# Patient Record
Sex: FEMALE | Race: WHITE | Employment: UNEMPLOYED
[De-identification: names, ages, dates, MRNs, and addresses within clinical notes are randomized per-mention and may not be internally consistent; named-entity substitution may affect disease eponyms.]

---

## 2017-05-26 ENCOUNTER — HEALTH MAINTENANCE LETTER (OUTPATIENT)
Age: 57
End: 2017-05-26

## 2017-10-11 NOTE — PROGRESS NOTES
"  SUBJECTIVE:                                                    Rosanna Ivory is a 57 year old female who presents to clinic today for the following health issues:    HPI    Concern - Ear Problem  Onset: Sunday    Description:   \"Feeling like it was plugged\"  Went to a concert standing by the speakers    Progression of Symptoms:  improving    Accompanying Signs & Symptoms:  \"feels like vibration in there\"    Previous history of similar problem:   No    Precipitating factors:   Worsened by: no    Alleviating factors:  Improved by: baby oil on a cotton ball made it feel a little better    Therapies Tried and outcome: none    The sounds seems amplified on the right side since the concert but is improving. She denies any pain or drainage.     She was previously on lisinopril for hypertension, prescribed at [facility] but has been off this for the past year.     Needs cipro for trip to [place] in December.    Problem list and histories reviewed & adjusted, as indicated.  Additional history: none    ROS:  GENERAL: Denies fever, fatigue, weakness, weight gain, or weight loss.  HEENT: Eyes-Denies pain, redness, loss of vision, double or blurred vision.     Ears/Nose- +Right ear vibration/sound amplification. Denies tinnitus, loss of hearing, epistaxis, decreased sense of smell, nasal congestion. Denies loss of sense of taste, dry mouth, or sore throat.   CARDIOVASCULAR: Denies chest pain, shortness of breath, irregular heartbeats, palpitations, or edema.  RESPIRATORY: Denies cough, hemoptysis, and shortness of breath.  NEUROLOGIC: Denies headache, fainting, dizziness, memory loss, numbness, tingling, or seizures.    OBJECTIVE:     /86  Pulse 84  Temp 97.8  F (36.6  C) (Temporal)  Ht 5' 3.58\" (1.615 m)  Wt 127 lb (57.6 kg)  SpO2 99%  BMI 22.09 kg/m2  Body mass index is 22.09 kg/(m^2).  GENERAL: healthy, alert and no distress  HENT: ear canals and TM's normal, nose and mouth without ulcers or lesions  RESP: lungs " clear to auscultation - no rales, rhonchi or wheezes  CV: regular rate and rhythm, normal S1 S2, no S3 or S4, no murmur, click or rub    ASSESSMENT/PLAN:       ICD-10-CM    1. Discomfort of right ear H92.01    2. Benign essential hypertension I10 lisinopril (PRINIVIL/ZESTRIL) 10 MG tablet   3. Visit for screening mammogram Z12.31 MA SCREENING DIGITAL BILAT - Future  (s+30)   4. Traveler's diarrhea A09 ciprofloxacin (CIPRO) 500 MG tablet         1. No abnormality on exam. The vibration sensation is consistent with irritation from the loud concert and symptoms are improving so no intervention is needed.    2. Will restart lisinopril 10 mg daily and she will follow up in the pharmacy weekly for BP checks. Instructed to schedule an updated physical with updated labs.    3. Mammogram ordered.     4. Needs cipro to have on hand for traveler's diarrhea when traveling to [place] in December.      Broderick Siu PA-C  [facility]

## 2017-10-12 ENCOUNTER — OFFICE VISIT (OUTPATIENT)
Dept: FAMILY MEDICINE | Facility: OTHER | Age: 57
End: 2017-10-12
Payer: COMMERCIAL

## 2017-10-12 VITALS
DIASTOLIC BLOOD PRESSURE: 86 MMHG | OXYGEN SATURATION: 99 % | BODY MASS INDEX: 21.68 KG/M2 | TEMPERATURE: 97.8 F | SYSTOLIC BLOOD PRESSURE: 146 MMHG | HEIGHT: 64 IN | WEIGHT: 127 LBS | HEART RATE: 84 BPM

## 2017-10-12 DIAGNOSIS — H92.01 DISCOMFORT OF RIGHT EAR: Primary | ICD-10-CM

## 2017-10-12 DIAGNOSIS — I10 BENIGN ESSENTIAL HYPERTENSION: ICD-10-CM

## 2017-10-12 DIAGNOSIS — A09 TRAVELER'S DIARRHEA: ICD-10-CM

## 2017-10-12 DIAGNOSIS — Z12.31 VISIT FOR SCREENING MAMMOGRAM: ICD-10-CM

## 2017-10-12 PROCEDURE — 99203 OFFICE O/P NEW LOW 30 MIN: CPT | Performed by: PHYSICIAN ASSISTANT

## 2017-10-12 RX ORDER — CIPROFLOXACIN 500 MG/1
500 TABLET, FILM COATED ORAL 2 TIMES DAILY
Qty: 6 TABLET | Refills: 0 | Status: SHIPPED | OUTPATIENT
Start: 2017-10-12 | End: 2018-06-13

## 2017-10-12 RX ORDER — LISINOPRIL 10 MG/1
10 TABLET ORAL DAILY
Qty: 90 TABLET | Refills: 3 | Status: SHIPPED | OUTPATIENT
Start: 2017-10-12 | End: 2018-10-19

## 2017-10-12 ASSESSMENT — PAIN SCALES - GENERAL: PAINLEVEL: NO PAIN (0)

## 2017-10-12 NOTE — MR AVS SNAPSHOT
After Visit Summary   10/12/2017    Rosanna Ivory    MRN: [id number]           Patient Information     Date Of Birth          1960        Visit Information        Provider Department      10/12/2017 3:00 PM Broderick Siu PA-C [facility]        Today's Diagnoses     Discomfort of right ear    -  1    Benign essential hypertension        Visit for screening mammogram        Traveler's diarrhea          Care Instructions    The ear discomfort is likely from the loud speakers at the concert and should continue to improve. Your ear drum was normal on exam.    Will restart you on lisinopril 10 mg daily to help control your blood pressure.  Try to eat a low salt diet.  Follow up weekly in the pharmacy for blood pressure checks.     Will order an updated mammogram.    Come in for a physical within the next month for updated labs.              Follow-ups after your visit        Future tests that were ordered for you today     Open Future Orders        Priority Expected Expires Ordered    MA SCREENING DIGITAL BILAT - Future  (s+30) Routine  10/11/2018 10/12/2017            Who to contact     If you have questions or need follow up information about today's clinic visit or your schedule please contact [facility] directly at [phone].  Normal or non-critical lab and imaging results will be communicated to you by MyChart, letter or phone within 4 business days after the clinic has received the results. If you do not hear from us within 7 days, please contact the clinic through "[email or web address]hart or phone. If you have a critical or abnormal lab result, we will notify you by phone as soon as possible.  Submit refill requests through [employer] or call your pharmacy and they will forward the refill request to us. Please allow 3 business days for your refill to be completed.          Additional Information About Your Visit        MyChart Information     [employer] gives you secure  "access to your electronic health record. If you see a primary care provider, you can also send messages to your care team and make appointments. If you have questions, please call your primary care clinic.  If you do not have a primary care provider, please call [phone] and they will assist you.        Care EveryWhere ID     This is your Care EveryWhere ID. This could be used by other organizations to access your [place] medical records  [id number]        Your Vitals Were     Pulse Temperature Height Pulse Oximetry BMI (Body Mass Index)       84 97.8  F (36.6  C) (Temporal) 5' 3.58\" (1.615 m) 99% 22.09 kg/m2        Blood Pressure from Last 3 Encounters:   10/12/17 146/82   07/29/13 (!) 145/95   05/16/13 (!) 147/94    Weight from Last 3 Encounters:   10/12/17 127 lb (57.6 kg)   07/29/13 135 lb (61.2 kg)   05/16/13 135 lb (61.2 kg)                 Today's Medication Changes          These changes are accurate as of: 10/12/17  3:24 PM.  If you have any questions, ask your nurse or doctor.               Start taking these medicines.        Dose/Directions    ciprofloxacin 500 MG tablet   Commonly known as:  CIPRO   Used for:  Traveler's diarrhea   Started by:  Broderick Siu PA-C        Dose:  500 mg   Take 1 tablet (500 mg) by mouth 2 times daily   Quantity:  6 tablet   Refills:  0       lisinopril 10 MG tablet   Commonly known as:  PRINIVIL/ZESTRIL   Used for:  Benign essential hypertension   Started by:  Broderick Siu PA-C        Dose:  10 mg   Take 1 tablet (10 mg) by mouth daily   Quantity:  90 tablet   Refills:  3         Stop taking these medicines if you haven't already. Please contact your care team if you have questions.     ALPRAZolam 0.5 MG tablet   Commonly known as:  XANAX   Stopped by:  Broderick Siu PA-C           citalopram 20 MG tablet   Commonly known as:  celeXA   Stopped by:  Broderick Siu PA-C           LESSINA-28 0.1-20 MG-MCG per tablet   Generic drug:  " levonorgestrel-ethinyl estradiol   Stopped by:  Broderick Siu PA-C           LORazepam 0.5 MG tablet   Commonly known as:  ATIVAN   Stopped by:  Broderick Siu PA-C                Where to get your medicines      These medications were sent to [id number] [place] - [address]  [address], [place] [address]     Phone:  [phone]     ciprofloxacin 500 MG tablet    lisinopril 10 MG tablet                Primary Care Provider Office Phone # Fax #    Shannan Dahlia FLORENCIA Amaya [phone]       [address] [address]        Equal Access to Services     ROMAN HANNAH : Laure Rowland, wajustice campos, qaybta kaalmada adejose francisco, alice fiore . So [facility] [phone].    ATENCIÓN: Si habla español, tiene a lin disposición servicios gratuitos de asistencia lingüística. Llame al [phone].    We comply with applicable federal civil rights laws and [place] laws. We do not discriminate on the basis of race, color, national origin, age, disability, sex, sexual orientation, or gender identity.            Thank you!     Thank you for choosing [facility]  for your care. Our goal is always to provide you with excellent care. Hearing back from our patients is one way we can continue to improve our services. Please take a few minutes to complete the written survey that you may receive in the mail after your visit with us. Thank you!             Your Updated Medication List - Protect others around you: Learn how to safely use, store and throw away your medicines at www.disposemymeds.org.          This list is accurate as of: 10/12/17  3:24 PM.  Always use your most recent med list.                   Brand Name Dispense Instructions for use Diagnosis    CALCIUM 600 + D 600-200 MG-UNIT Tabs     0    bid        ciprofloxacin 500 MG tablet    CIPRO    6 tablet    Take 1 tablet (500 mg) by mouth 2 times  daily    Traveler's diarrhea       fluticasone 50 MCG/ACT spray    FLONASE    1 Package    USE TWO SPRAYS IN EACH NOSTRIL DAILY    Allergic rhinitis, cause unspecified       lisinopril 10 MG tablet    PRINIVIL/ZESTRIL    90 tablet    Take 1 tablet (10 mg) by mouth daily    Benign essential hypertension       omega-3 fatty acids 1200 MG capsule      Take four tablets daily        omeprazole 20 MG CR capsule    priLOSEC    30 capsule    Take 1 capsule by mouth every morning (before breakfast) for 30 days. TAKE BEFORE YOUR BREAKFAST MEAL DAILY        ZANTAC PO      Take  by mouth.

## 2017-10-12 NOTE — PATIENT INSTRUCTIONS
The ear discomfort is likely from the loud speakers at the concert and should continue to improve. Your ear drum was normal on exam.    Will restart you on lisinopril 10 mg daily to help control your blood pressure.  Try to eat a low salt diet.  Follow up weekly in the pharmacy for blood pressure checks.     Will order an updated mammogram.    Come in for a physical within the next month for updated labs.

## 2017-10-12 NOTE — NURSING NOTE
"Chief Complaint   Patient presents with     Ear Problem     Panel Management     height, tdap, pap, flu, mammo, honoring choices, hep c, lipids       Initial /82 (BP Location: Right arm, Patient Position: Chair, Cuff Size: Adult Regular)  Pulse 84  Temp 97.8  F (36.6  C) (Temporal)  Ht 5' 3.58\" (1.615 m)  Wt 127 lb (57.6 kg)  SpO2 99%  BMI 22.09 kg/m2 Estimated body mass index is 22.09 kg/(m^2) as calculated from the following:    Height as of this encounter: 5' 3.58\" (1.615 m).    Weight as of this encounter: 127 lb (57.6 kg).  Medication Reconciliation: complete     Nany Alicea CMA      "

## 2017-11-02 ENCOUNTER — ALLIED HEALTH/NURSE VISIT (OUTPATIENT)
Dept: FAMILY MEDICINE | Facility: OTHER | Age: 57
End: 2017-11-02
Payer: COMMERCIAL

## 2017-11-02 ENCOUNTER — RADIANT APPOINTMENT (OUTPATIENT)
Dept: MAMMOGRAPHY | Facility: OTHER | Age: 57
End: 2017-11-02
Attending: PHYSICIAN ASSISTANT
Payer: COMMERCIAL

## 2017-11-02 VITALS — SYSTOLIC BLOOD PRESSURE: 128 MMHG | DIASTOLIC BLOOD PRESSURE: 74 MMHG

## 2017-11-02 DIAGNOSIS — I10 BENIGN ESSENTIAL HYPERTENSION: Primary | ICD-10-CM

## 2017-11-02 DIAGNOSIS — Z12.31 VISIT FOR SCREENING MAMMOGRAM: ICD-10-CM

## 2017-11-02 PROCEDURE — G0202 SCR MAMMO BI INCL CAD: HCPCS | Mod: TC

## 2017-11-02 PROCEDURE — 99207 ZZC NO CHARGE NURSE ONLY: CPT | Performed by: PHYSICIAN ASSISTANT

## 2017-11-02 NOTE — PROGRESS NOTES
Rosanna Ivory is enrolled/participating in the retail pharmacy Blood Pressure Goals Achievement Program (BPGAP).  Rosanna Ivory was evaluated at [facility] on November 2, 2017 at which time her blood pressure was:    BP Readings from Last 3 Encounters:   11/02/17 128/74   10/12/17 146/86   07/29/13 (!) 145/95     Rosanna Ivory is not experiencing symptoms.    Follow-Up: BP is at goal of < 140/90mmHg (patient 18+ years of age with or without diabetes).  Recommended follow-up at pharmacy in 6 months.     Recommendation to Provider: none       Completed by:     Hasmukh Darden PharmD  [facility]   [phone]

## 2017-11-02 NOTE — MR AVS SNAPSHOT
After Visit Summary   11/2/2017    Rosanna Ivory    MRN: [id number]           Patient Information     Date Of Birth          1960        Visit Information        Provider Department      11/2/2017 5:24 PM Broderick Siu PA-C [facility]        Today's Diagnoses     Benign essential hypertension    -  1       Follow-ups after your visit        Who to contact     If you have questions or need follow up information about today's clinic visit or your schedule please contact [facility] directly at [phone].  Normal or non-critical lab and imaging results will be communicated to you by [employer], letter or phone within 4 business days after the clinic has received the results. If you do not hear from us within 7 days, please contact the clinic through [employer] or phone. If you have a critical or abnormal lab result, we will notify you by phone as soon as possible.  Submit refill requests through [employer] or call your pharmacy and they will forward the refill request to us. Please allow 3 business days for your refill to be completed.          Additional Information About Your Visit        MyChart Information     [employer] gives you secure access to your electronic health record. If you see a primary care provider, you can also send messages to your care team and make appointments. If you have questions, please call your primary care clinic.  If you do not have a primary care provider, please call [phone] and they will assist you.        Care EveryWhere ID     This is your Care EveryWhere ID. This could be used by other organizations to access your [place] medical records  [id number]         Blood Pressure from Last 3 Encounters:   11/02/17 128/74   10/12/17 146/86   07/29/13 (!) 145/95    Weight from Last 3 Encounters:   10/12/17 127 lb (57.6 kg)   07/29/13 135 lb (61.2 kg)   05/16/13 135 lb (61.2 kg)              Today, you had the following     No orders  found for display       Primary Care Provider Office Phone # Fax #    Broderick Siu PA-C [phone]       [address] [place] [address] [address]  [facility] [address]        Equal Access to Services     ROMAN HANNAH : Hadii aad ku hadrandyo Soericka, waaxda luqadaha, qaybta kaalmada adeegyada, alice feliciain hayaapina mcdonaldlandyjo ann garza. So [facility] [phone].    ATENCIÓN: Si habla español, tiene a lin disposición servicios gratuitos de asistencia lingüística. Llame al [phone].    We comply with applicable federal civil rights laws and [place] laws. We do not discriminate on the basis of race, color, national origin, age, disability, sex, sexual orientation, or gender identity.            Thank you!     Thank you for choosing [facility]  for your care. Our goal is always to provide you with excellent care. Hearing back from our patients is one way we can continue to improve our services. Please take a few minutes to complete the written survey that you may receive in the mail after your visit with us. Thank you!             Your Updated Medication List - Protect others around you: Learn how to safely use, store and throw away your medicines at www.disposemymeds.org.          This list is accurate as of: 11/2/17  5:27 PM.  Always use your most recent med list.                   Brand Name Dispense Instructions for use Diagnosis    CALCIUM 600 + D 600-200 MG-UNIT Tabs     0    bid        ciprofloxacin 500 MG tablet    CIPRO    6 tablet    Take 1 tablet (500 mg) by mouth 2 times daily    Traveler's diarrhea       fluticasone 50 MCG/ACT spray    FLONASE    1 Package    USE TWO SPRAYS IN EACH NOSTRIL DAILY    Allergic rhinitis, cause unspecified       lisinopril 10 MG tablet    PRINIVIL/ZESTRIL    90 tablet    Take 1 tablet (10 mg) by mouth daily    Benign essential hypertension       omega-3 fatty acids 1200 MG capsule      Take four tablets daily        omeprazole 20 MG CR capsule    priLOSEC    30  capsule    Take 1 capsule by mouth every morning (before breakfast) for 30 days. TAKE BEFORE YOUR BREAKFAST MEAL DAILY        ZANTAC PO      Take  by mouth.

## 2017-11-22 ENCOUNTER — TELEPHONE (OUTPATIENT)
Dept: FAMILY MEDICINE | Facility: OTHER | Age: 57
End: 2017-11-22

## 2017-11-22 NOTE — TELEPHONE ENCOUNTER
Summary:    Patient is due/failing the following:   PAP    Action needed:   Patient needs office visit for PAP.    Type of outreach:    none per care everywhere UTD    Questions for provider review:    None                                                                                                                                    Ximena Jackson    Please abstract the following data from this visit with this patient into the appropriate field in Epic:    Pap smear done on this date: 02/10/2015 (approximately), by this group: [facility], results in care everywhere.       Chart routed to [employer]  .      Panel Management Review      Patient has the following on her problem list:     Hypertension   Last three blood pressure readings:  BP Readings from Last 3 Encounters:   11/02/17 128/74   10/12/17 146/86   07/29/13 (!) 145/95     Blood pressure: Passed    HTN Guidelines:  Age 18-59 BP range:  Less than 140/90  Age 60-85 with Diabetes:  Less than 140/90  Age 60-85 without Diabetes:  less than 150/90        Composite cancer screening  Chart review shows that this patient is due/due soon for the following Pap Smear

## 2017-12-12 ENCOUNTER — OFFICE VISIT (OUTPATIENT)
Dept: FAMILY MEDICINE | Facility: OTHER | Age: 57
End: 2017-12-12
Payer: COMMERCIAL

## 2017-12-12 VITALS
OXYGEN SATURATION: 100 % | HEART RATE: 92 BPM | TEMPERATURE: 97.6 F | RESPIRATION RATE: 14 BRPM | DIASTOLIC BLOOD PRESSURE: 72 MMHG | WEIGHT: 128.6 LBS | SYSTOLIC BLOOD PRESSURE: 112 MMHG | BODY MASS INDEX: 22.36 KG/M2

## 2017-12-12 DIAGNOSIS — I10 BENIGN ESSENTIAL HYPERTENSION: ICD-10-CM

## 2017-12-12 DIAGNOSIS — J20.9 ACUTE BRONCHITIS WITH SYMPTOMS > 10 DAYS: Primary | ICD-10-CM

## 2017-12-12 DIAGNOSIS — R07.0 THROAT PAIN: ICD-10-CM

## 2017-12-12 LAB
DEPRECATED S PYO AG THROAT QL EIA: NORMAL
SPECIMEN SOURCE: NORMAL

## 2017-12-12 PROCEDURE — 87880 STREP A ASSAY W/OPTIC: CPT | Performed by: NURSE PRACTITIONER

## 2017-12-12 PROCEDURE — 99214 OFFICE O/P EST MOD 30 MIN: CPT | Performed by: NURSE PRACTITIONER

## 2017-12-12 PROCEDURE — 87081 CULTURE SCREEN ONLY: CPT | Performed by: NURSE PRACTITIONER

## 2017-12-12 RX ORDER — AZITHROMYCIN 250 MG/1
TABLET, FILM COATED ORAL
Qty: 6 TABLET | Refills: 0 | Status: SHIPPED | OUTPATIENT
Start: 2017-12-12 | End: 2018-06-13

## 2017-12-12 ASSESSMENT — PAIN SCALES - GENERAL: PAINLEVEL: NO PAIN (0)

## 2017-12-12 NOTE — PATIENT INSTRUCTIONS
- Start Azithromycin today   - Recommend humidification at night  - Hot water with honey and lemon, helps suppress cough.   -Over the counter cough drops.  - Coricidin cold and cough medication for individuals with hypertension  - If your cough continues or worsens please return to clinic.     PEARL Dominguez CNP

## 2017-12-12 NOTE — PROGRESS NOTES
"  SUBJECTIVE:                                                    Rosanna Ivory is a 57 year old female who presents to clinic today for the following health issues:    HPI    Acute Illness   Acute illness concerns: Cough  Onset: Couple of weeks ago    Fever: no    Chills/Sweats: YES - chills and sweats    Headache (location?): YES    Sinus Pressure:YES    Conjunctivitis:  no    Ear Pain: no    Rhinorrhea: YES    Congestion: no    Sore Throat: YES     Cough: YES-productive of \"brown\" sputum    Wheeze: YES    Decreased Appetite: no    Nausea: no    Vomiting: no    Diarrhea:  no    Dysuria/Freq.: no    Fatigue/Achiness: YES- fatigue and achiness    Sick/Strep Exposure: no     Therapies Tried and outcome: Ibuprofen - helps        Problem list and histories reviewed & adjusted, as indicated.  Additional history: as documented      Current Outpatient Prescriptions   Medication Sig Dispense Refill     lisinopril (PRINIVIL/ZESTRIL) 10 MG tablet Take 1 tablet (10 mg) by mouth daily 90 tablet 3     ciprofloxacin (CIPRO) 500 MG tablet Take 1 tablet (500 mg) by mouth 2 times daily 6 tablet 0     fluticasone (FLONASE) 50 MCG/ACT nasal spray USE TWO SPRAYS IN EACH NOSTRIL DAILY 1 Package prn     Ranitidine HCl (ZANTAC PO) Take  by mouth.       CALCIUM 600 + D 600-200 MG-UNIT OR TABS bid 0 0     omeprazole (PRILOSEC) 20 MG capsule Take 1 capsule by mouth every morning (before breakfast) for 30 days. TAKE BEFORE YOUR BREAKFAST MEAL DAILY 30 capsule 0     OMEGA-3 FATTY ACIDS 1200 MG OR CAPS Take four tablets daily  0     BP Readings from Last 3 Encounters:   12/12/17 112/72   11/02/17 128/74   10/12/17 146/86    Wt Readings from Last 3 Encounters:   12/12/17 128 lb 9.6 oz (58.3 kg)   10/12/17 127 lb (57.6 kg)   07/29/13 135 lb (61.2 kg)                      ROS:  As noted above.     OBJECTIVE:     /72  Pulse 92  Temp 97.6  F (36.4  C) (Temporal)  Resp 14  Wt 128 lb 9.6 oz (58.3 kg)  SpO2 100%  BMI 22.36 kg/m2  Body mass " index is 22.36 kg/(m^2).  GENERAL: healthy, alert and no distress  EYES: Eyes grossly normal to inspection, PERRL and conjunctivae and sclerae normal  HENT: ear canals and TM's normal, nose and mouth without ulcers or lesions  NECK: no adenopathy, no asymmetry, masses, or scars and thyroid normal to palpation  RESP: lungs clear to auscultation - no rales, rhonchi or wheezes  CV: regular rate and rhythm, normal S1 S2, no S3 or S4, no murmur, click or rub, no peripheral edema and peripheral pulses strong  MS: no gross musculoskeletal defects noted, no edema  SKIN: no suspicious lesions or rashes  NEURO: Normal strength and tone, mentation intact and speech normal  PSYCH: mentation appears normal, affect normal/bright    Diagnostic Test Results:  Results for orders placed or performed in visit on 12/12/17   Strep, Rapid Screen   Result Value Ref Range    Specimen Description Throat     Rapid Strep A Screen       NEGATIVE: No Group A streptococcal antigen detected by immunoassay, await culture report.       ASSESSMENT/PLAN:         ICD-10-CM    1. Acute bronchitis with symptoms > 10 days J20.9 azithromycin (ZITHROMAX) 250 MG tablet   2. Throat pain R07.0 Strep, Rapid Screen     Beta strep group A culture   3. Benign essential hypertension I10        - Start Azithromycin today   - Recommend humidification at night  - Hot water with honey and lemon, helps suppress cough.   -Over the counter cough drops.  - Coricidin cold and cough medication for individuals with hypertension  - If your cough continues or worsens please return to clinic.     Patient Instructions   - Start Azithromycin today   - Recommend humidification at night  - Hot water with honey and lemon, helps suppress cough.   -Over the counter cough drops.  - Coricidin cold and cough medication for individuals with hypertension  - If your cough continues or worsens please return to clinic.     PEARL Dominguez CNP, APRN CNP [place]  [facility]

## 2017-12-12 NOTE — MR AVS SNAPSHOT
After Visit Summary   12/12/2017    Rosanna Ivory    MRN: [id number]           Patient Information     Date Of Birth          1960        Visit Information        Provider Department      12/12/2017 3:40 PM Nae Pantoja APRN CNP [facility]        Today's Diagnoses     Throat pain    -  1    Benign essential hypertension        Acute bronchitis with symptoms > 10 days          Care Instructions    - Start Azithromycin today   - Recommend humidification at night  - Hot water with honey and lemon, helps suppress cough.   -Over the counter cough drops.  - Coricidin cold and cough medication for individuals with hypertension  - If your cough continues or worsens please return to clinic.     PEARL Dominguez CNP              Follow-ups after your visit        Follow-up notes from your care team     Return if symptoms worsen or fail to improve.      Who to contact     If you have questions or need follow up information about today's clinic visit or your schedule please contact [facility] directly at [phone].  Normal or non-critical lab and imaging results will be communicated to you by Byliner[employer], letter or phone within 4 business days after the clinic has received the results. If you do not hear from us within 7 days, please contact the clinic through [employer] or phone. If you have a critical or abnormal lab result, we will notify you by phone as soon as possible.  Submit refill requests through [employer] or call your pharmacy and they will forward the refill request to us. Please allow 3 business days for your refill to be completed.          Additional Information About Your Visit        Bylinerhart Information     [employer] gives you secure access to your electronic health record. If you see a primary care provider, you can also send messages to your care team and make appointments. If you have questions, please call your primary care clinic.  If you do not have a  primary care provider, please call [phone] and they will assist you.        Care EveryWhere ID     This is your Care EveryWhere ID. This could be used by other organizations to access your [place] medical records  [id number]        Your Vitals Were     Pulse Temperature Respirations Pulse Oximetry BMI (Body Mass Index)       92 97.6  F (36.4  C) (Temporal) 14 100% 22.36 kg/m2        Blood Pressure from Last 3 Encounters:   12/12/17 112/72   11/02/17 128/74   10/12/17 146/86    Weight from Last 3 Encounters:   12/12/17 128 lb 9.6 oz (58.3 kg)   10/12/17 127 lb (57.6 kg)   07/29/13 135 lb (61.2 kg)              We Performed the Following     Beta strep group A culture     Strep, Rapid Screen          Today's Medication Changes          These changes are accurate as of: 12/12/17  4:12 PM.  If you have any questions, ask your nurse or doctor.               Start taking these medicines.        Dose/Directions    azithromycin 250 MG tablet   Commonly known as:  ZITHROMAX   Used for:  Acute bronchitis with symptoms > 10 days   Started by:  Nae Pantoja APRN CNP        Two tablets first day, then one tablet daily for four days.   Quantity:  6 tablet   Refills:  0            Where to get your medicines      These medications were sent to [place] [address] [place] [facility] [place], [place] - [address] [address] [place] [address]  [address] [address] [address]     Phone:  [phone]     azithromycin 250 MG tablet                Primary Care Provider Office Phone # Fax #    Broderick Siu PA-C [phone]       [address] [place] [address]  [facility] [address]        Equal Access to Services     [facility]: Hadmihir Rowland, tavia campos, qaalice ratliff. [facility] [phone].    ATENCIÓN: Si habla español, tiene a lin disposición servicios gratuitos de asistencia lingüística. [place] al [phone].    We comply with applicable federal civil rights laws and  [place] laws. We do not discriminate on the basis of race, color, national origin, age, disability, sex, sexual orientation, or gender identity.            Thank you!     Thank you for choosing [facility]  for your care. Our goal is always to provide you with excellent care. Hearing back from our patients is one way we can continue to improve our services. Please take a few minutes to complete the written survey that you may receive in the mail after your visit with us. Thank you!             Your Updated Medication List - Protect others around you: Learn how to safely use, store and throw away your medicines at www.disposemymeds.org.          This list is accurate as of: 12/12/17  4:12 PM.  Always use your most recent med list.                   Brand Name Dispense Instructions for use Diagnosis    azithromycin 250 MG tablet    ZITHROMAX    6 tablet    Two tablets first day, then one tablet daily for four days.    Acute bronchitis with symptoms > 10 days       CALCIUM 600 + D 600-200 MG-UNIT Tabs     0    bid        ciprofloxacin 500 MG tablet    CIPRO    6 tablet    Take 1 tablet (500 mg) by mouth 2 times daily    Traveler's diarrhea       fluticasone 50 MCG/ACT spray    FLONASE    1 Package    USE TWO SPRAYS IN EACH NOSTRIL DAILY    Allergic rhinitis, cause unspecified       lisinopril 10 MG tablet    PRINIVIL/ZESTRIL    90 tablet    Take 1 tablet (10 mg) by mouth daily    Benign essential hypertension       omega-3 fatty acids 1200 MG capsule      Take four tablets daily        omeprazole 20 MG CR capsule    priLOSEC    30 capsule    Take 1 capsule by mouth every morning (before breakfast) for 30 days. TAKE BEFORE YOUR BREAKFAST MEAL DAILY        ZANTAC PO      Take  by mouth.

## 2017-12-13 LAB
BACTERIA SPEC CULT: NORMAL
SPECIMEN SOURCE: NORMAL

## 2018-06-01 ENCOUNTER — HEALTH MAINTENANCE LETTER (OUTPATIENT)
Age: 58
End: 2018-06-01

## 2018-06-08 NOTE — PROGRESS NOTES
SUBJECTIVE:   CC: Rosanna Ivory is an 57 year old woman who presents for preventive health visit.     Physical   Annual:     Getting at least 3 servings of Calcium per day::  Yes    Bi-annual eye exam::  Yes    Dental care twice a year::  Yes    Sleep apnea or symptoms of sleep apnea::  Excessive snoring    Diet::  Regular (no restrictions)    Frequency of exercise::  1 day/week    Duration of exercise::  15-30 minutes    Taking medications regularly::  Yes    Medication side effects::  None    Additional concerns today::  YES                Today's PHQ-2 Score:   PHQ-2 ( 1999 Pfizer) 6/13/2018   Q1: Little interest or pleasure in doing things 0   Q2: Feeling down, depressed or hopeless 0   PHQ-2 Score 0   Q1: Little interest or pleasure in doing things Not at all   Q2: Feeling down, depressed or hopeless Not at all   PHQ-2 Score 0       Abuse: Current or Past(Physical, Sexual or Emotional)- No  Do you feel safe in your environment - Yes    Social History   Substance Use Topics     Smoking status: Current Every Day Smoker     Packs/day: 0.50     Types: Cigarettes     Smokeless tobacco: Never Used     Alcohol use 15.0 oz/week     30 Cans of beer per week     Alcohol Use 6/13/2018   If you drink alcohol do you typically have greater than 3 drinks per day OR greater than 7 drinks per week? Yes   AUDIT SCORE  8     AUDIT - Alcohol Use Disorders Identification Test - Reproduced from the World Health Organization Audit 2001 (Second Edition) 6/13/2018   1.  How often do you have a drink containing alcohol? 4 or more times a week   2.  How many drinks containing alcohol do you have on a typical day when you are drinking? 3 or 4   3.  How often do you have five or more drinks on one occasion? Weekly   4.  How often during the last year have you found that you were not able to stop drinking once you had started? Never   5.  How often during the last year have you failed to do what was normally expected of you because of  drinking? Never   6.  How often during the last year have you needed a first drink in the morning to get yourself going after a heavy drinking session? Never   7.  How often during the last year have you had a feeling of guilt or remorse after drinking? Never   8.  How often during the last year have you been unable to remember what happened the night before because of your drinking? Never   9.  Have you or someone else been injured because of your drinking? No   10. Has a relative, friend, doctor or other health care worker been concerned about your drinking or suggested you cut down? No   TOTAL SCORE 8     Went through a period of time when she was self medicating with alcohol. Does not do this anymore. Resolving.     Reviewed orders with patient.  Reviewed health maintenance and updated orders accordingly - Yes  BP Readings from Last 3 Encounters:   06/13/18 120/78   12/12/17 112/72   11/02/17 128/74    Wt Readings from Last 3 Encounters:   06/13/18 131 lb (59.4 kg)   12/12/17 128 lb 9.6 oz (58.3 kg)   10/12/17 127 lb (57.6 kg)                  Current Outpatient Prescriptions   Medication Sig Dispense Refill     fluticasone (FLONASE) 50 MCG/ACT nasal spray USE TWO SPRAYS IN EACH NOSTRIL DAILY 1 Package prn     lisinopril (PRINIVIL/ZESTRIL) 10 MG tablet Take 1 tablet (10 mg) by mouth daily 90 tablet 3     OMEGA-3 FATTY ACIDS 1200 MG OR CAPS Take four tablets daily  0     Allergies   Allergen Reactions     Iodine        Patient over age 50, mutual decision to screen reflected in health maintenance.    Pertinent mammograms are reviewed under the imaging tab.  History of abnormal Pap smear:   YES - updated in Problem List and Health Maintenance accordingly  Last 3 Pap Results:   PAP (no units)   Date Value   01/18/2012 NIL   06/03/2011 NIL   07/30/2010 ASC-US (A)       Reviewed and updated as needed this visit by clinical staff  Tobacco  Allergies  Meds  Med Hx  Surg Hx  Fam Hx  Soc Hx        Reviewed and  "updated as needed this visit by Provider        Past Medical History:   Diagnosis Date     Alcohol abuse 2013    treatment     Allergic rhinitis, cause unspecified     seasonal     ASCUS on Pap smear 7/30/10    HPV + 52 (high risk)     Esophageal reflux      External hemorrhoids without mention of complication      History of colposcopy with cervical biopsy 8/23/10    bx- cervicitis.  no dysplasia noted     Other chest pain     atypical, normal stress testing     Pure hypercholesterolemia     total 220      Past Surgical History:   Procedure Laterality Date     C  DELIVERY ONLY      , Low Cervical     COLONOSCOPY  02/01/10    with snare polypectomy     HC CARDIAC STRESS TST,COMPLETE      normal per pt     HC HEMORRHOIDECTOMY,INT/EXT,SIMPLE  02/18/10       Review of Systems   Constitutional: Negative for chills and fever.   HENT: Negative for congestion, ear pain, hearing loss and sore throat.    Eyes: Negative for pain and visual disturbance.   Respiratory: Negative for cough and shortness of breath.    Cardiovascular: Negative for chest pain, palpitations and peripheral edema.   Gastrointestinal: Negative for abdominal pain, constipation, diarrhea, heartburn, hematochezia and nausea.   Genitourinary: Negative for dysuria, frequency, genital sores, hematuria, pelvic pain, urgency, vaginal bleeding and vaginal discharge.   Musculoskeletal: Negative for arthralgias, joint swelling and myalgias.   Skin: Negative for rash.   Neurological: Negative for dizziness, weakness, headaches and paresthesias.   Psychiatric/Behavioral: Negative for mood changes. The patient is not nervous/anxious.         OBJECTIVE:   /78  Pulse 72  Temp 97.7  F (36.5  C)  Resp 16  Ht 5' 3.5\" (1.613 m)  Wt 131 lb (59.4 kg)  BMI 22.84 kg/m2  Physical Exam   Constitutional: She is oriented to person, place, and time. She appears well-developed and well-nourished. No distress.   HENT:   Right Ear: Tympanic " membrane and external ear normal.   Left Ear: Tympanic membrane and external ear normal.   Nose: Nose normal.   Mouth/Throat: Oropharynx is clear and moist. No oropharyngeal exudate.   Eyes: Conjunctivae are normal. Pupils are equal, round, and reactive to light. Right eye exhibits no discharge. Left eye exhibits no discharge.   Neck: Neck supple. No tracheal deviation present. No thyromegaly present.   Cardiovascular: Normal rate, regular rhythm, S1 normal, S2 normal, normal heart sounds and normal pulses.  Exam reveals no S3, no S4 and no friction rub.    No murmur heard.  Pulmonary/Chest: Effort normal and breath sounds normal. No respiratory distress. She has no wheezes. She has no rales.   Abdominal: Soft. Bowel sounds are normal. She exhibits no mass. There is no hepatosplenomegaly. There is no tenderness.   Musculoskeletal: Normal range of motion. She exhibits no edema.   Lymphadenopathy:     She has no cervical adenopathy.   Neurological: She is alert and oriented to person, place, and time. She has normal strength and normal reflexes. She exhibits normal muscle tone.   Skin: Skin is warm and dry. No rash noted.   Psychiatric: She has a normal mood and affect. Judgment and thought content normal. Cognition and memory are normal.         ASSESSMENT/PLAN:       ICD-10-CM    1. Encounter for routine adult health examination without abnormal findings Z00.00    2. Benign essential hypertension I10 Comprehensive metabolic panel     CANCELED: Basic metabolic panel  (Ca, Cl, CO2, Creat, Gluc, K, Na, BUN)   3. Hyperlipidemia, unspecified hyperlipidemia type E78.5 Lipid panel reflex to direct LDL Fasting   4. Screening for malignant neoplasm of cervix Z12.4 Pap imaged thin layer screen with HPV - recommended age 30 - 65 years (select HPV order below)     HPV High Risk Types DNA Cervical   5. Screening for HIV (human immunodeficiency virus) Z11.4 HIV Screening   6. Need for hepatitis C screening test Z11.59 Hepatitis C  "Screen Reflex to HCV RNA Quant and Genotype   7. Need for prophylactic vaccination with tetanus-diphtheria (TD) Z23 TDAP VACCINE (ADACEL) [94197.002]     1st  Administration  [59404]     Each additional admin.  (Right click and add QUANTITY)  [51831]   8. Tobacco abuse Z72.0 Tobacco Cessation - Order to Satisfy Health Maintenance   9. Tobacco abuse counseling Z71.6    10. Need for vaccination Z23    11. Encounter for vitamin deficiency screening Z13.21 Vitamin D Deficiency   12. Personal history of tobacco use Z87.891 Prof Fee: Shared Decision Making Visit for Lung Cancer Screening       COUNSELING:  Reviewed preventive health counseling, as reflected in patient instructions       Regular exercise       Healthy diet/nutrition       Immunizations    Vaccinated for: TDAP               reports that she has been smoking Cigarettes.  She has been smoking about 0.50 packs per day. She has never used smokeless tobacco.  Tobacco Cessation Action Plan: Laser therapy   Estimated body mass index is 22.84 kg/(m^2) as calculated from the following:    Height as of this encounter: 5' 3.5\" (1.613 m).    Weight as of this encounter: 131 lb (59.4 kg).       Counseling Resources:  ATP IV Guidelines  Pooled Cohorts Equation Calculator  Breast Cancer Risk Calculator  FRAX Risk Assessment  ICSI Preventive Guidelines  Dietary Guidelines for Americans, 2010  USDA's MyPlate  ASA Prophylaxis  Lung CA Screening    PEARL Dominguez [facility]  Answers for HPI/ROS submitted by the patient on 6/13/2018   PHQ-2 Score: 0      Lung Cancer Screening Shared Decision Making Visit     Rosanna Ivory is not eligible for lung cancer screening on the basis of her smoking history:  History   Smoking Status     Current Every Day Smoker     Packs/day: 0.50     Years: 6.00     Types: Cigarettes   Smokeless Tobacco     Never Used       ShouldIScreen      "

## 2018-06-13 ENCOUNTER — OFFICE VISIT (OUTPATIENT)
Dept: FAMILY MEDICINE | Facility: OTHER | Age: 58
End: 2018-06-13
Payer: COMMERCIAL

## 2018-06-13 ENCOUNTER — TELEPHONE (OUTPATIENT)
Dept: FAMILY MEDICINE | Facility: OTHER | Age: 58
End: 2018-06-13

## 2018-06-13 VITALS
TEMPERATURE: 97.7 F | RESPIRATION RATE: 16 BRPM | WEIGHT: 131 LBS | HEIGHT: 64 IN | SYSTOLIC BLOOD PRESSURE: 120 MMHG | DIASTOLIC BLOOD PRESSURE: 78 MMHG | HEART RATE: 72 BPM | BODY MASS INDEX: 22.36 KG/M2

## 2018-06-13 DIAGNOSIS — Z00.00 ENCOUNTER FOR ROUTINE ADULT HEALTH EXAMINATION WITHOUT ABNORMAL FINDINGS: Primary | ICD-10-CM

## 2018-06-13 DIAGNOSIS — Z23 NEED FOR PROPHYLACTIC VACCINATION WITH TETANUS-DIPHTHERIA (TD): ICD-10-CM

## 2018-06-13 DIAGNOSIS — Z72.0 TOBACCO ABUSE: ICD-10-CM

## 2018-06-13 DIAGNOSIS — E78.5 HYPERLIPIDEMIA, UNSPECIFIED HYPERLIPIDEMIA TYPE: ICD-10-CM

## 2018-06-13 DIAGNOSIS — I10 BENIGN ESSENTIAL HYPERTENSION: ICD-10-CM

## 2018-06-13 DIAGNOSIS — Z87.891 PERSONAL HISTORY OF TOBACCO USE: ICD-10-CM

## 2018-06-13 DIAGNOSIS — Z13.21 ENCOUNTER FOR VITAMIN DEFICIENCY SCREENING: ICD-10-CM

## 2018-06-13 DIAGNOSIS — Z23 NEED FOR VACCINATION: ICD-10-CM

## 2018-06-13 DIAGNOSIS — Z11.59 NEED FOR HEPATITIS C SCREENING TEST: ICD-10-CM

## 2018-06-13 DIAGNOSIS — Z12.4 SCREENING FOR MALIGNANT NEOPLASM OF CERVIX: ICD-10-CM

## 2018-06-13 DIAGNOSIS — Z71.6 TOBACCO ABUSE COUNSELING: ICD-10-CM

## 2018-06-13 DIAGNOSIS — R79.89 LOW SERUM SODIUM: Primary | ICD-10-CM

## 2018-06-13 DIAGNOSIS — Z11.4 SCREENING FOR HIV (HUMAN IMMUNODEFICIENCY VIRUS): ICD-10-CM

## 2018-06-13 PROBLEM — F10.20 ALCOHOL DEPENDENCE (H): Status: ACTIVE | Noted: 2018-06-13

## 2018-06-13 LAB
ALBUMIN SERPL-MCNC: 4 G/DL (ref 3.4–5)
ALP SERPL-CCNC: 122 U/L (ref 40–150)
ALT SERPL W P-5'-P-CCNC: 25 U/L (ref 0–50)
ANION GAP SERPL CALCULATED.3IONS-SCNC: 8 MMOL/L (ref 3–14)
AST SERPL W P-5'-P-CCNC: 31 U/L (ref 0–45)
BILIRUB SERPL-MCNC: 0.9 MG/DL (ref 0.2–1.3)
BUN SERPL-MCNC: 6 MG/DL (ref 7–30)
CALCIUM SERPL-MCNC: 8.8 MG/DL (ref 8.5–10.1)
CHLORIDE SERPL-SCNC: 94 MMOL/L (ref 94–109)
CHOLEST SERPL-MCNC: 242 MG/DL
CO2 SERPL-SCNC: 28 MMOL/L (ref 20–32)
CREAT SERPL-MCNC: 0.58 MG/DL (ref 0.52–1.04)
GFR SERPL CREATININE-BSD FRML MDRD: >90 ML/MIN/1.7M2
GLUCOSE SERPL-MCNC: 77 MG/DL (ref 70–99)
HCV AB SERPL QL IA: NONREACTIVE
HDLC SERPL-MCNC: 106 MG/DL
HIV 1+2 AB+HIV1 P24 AG SERPL QL IA: NONREACTIVE
LDLC SERPL CALC-MCNC: 118 MG/DL
NONHDLC SERPL-MCNC: 136 MG/DL
POTASSIUM SERPL-SCNC: 4.3 MMOL/L (ref 3.4–5.3)
PROT SERPL-MCNC: 8.3 G/DL (ref 6.8–8.8)
SODIUM SERPL-SCNC: 130 MMOL/L (ref 133–144)
TRIGL SERPL-MCNC: 91 MG/DL

## 2018-06-13 PROCEDURE — 99396 PREV VISIT EST AGE 40-64: CPT | Performed by: NURSE PRACTITIONER

## 2018-06-13 PROCEDURE — 36415 COLL VENOUS BLD VENIPUNCTURE: CPT | Performed by: NURSE PRACTITIONER

## 2018-06-13 PROCEDURE — 87389 HIV-1 AG W/HIV-1&-2 AB AG IA: CPT | Performed by: NURSE PRACTITIONER

## 2018-06-13 PROCEDURE — G0145 SCR C/V CYTO,THINLAYER,RESCR: HCPCS | Performed by: NURSE PRACTITIONER

## 2018-06-13 PROCEDURE — 86803 HEPATITIS C AB TEST: CPT | Performed by: NURSE PRACTITIONER

## 2018-06-13 PROCEDURE — G0296 VISIT TO DETERM LDCT ELIG: HCPCS | Performed by: NURSE PRACTITIONER

## 2018-06-13 PROCEDURE — 80053 COMPREHEN METABOLIC PANEL: CPT | Performed by: NURSE PRACTITIONER

## 2018-06-13 PROCEDURE — 80061 LIPID PANEL: CPT | Performed by: NURSE PRACTITIONER

## 2018-06-13 PROCEDURE — 87624 HPV HI-RISK TYP POOLED RSLT: CPT | Performed by: NURSE PRACTITIONER

## 2018-06-13 ASSESSMENT — ENCOUNTER SYMPTOMS
JOINT SWELLING: 0
PARESTHESIAS: 0
NAUSEA: 0
FEVER: 0
ABDOMINAL PAIN: 0
MYALGIAS: 0
COUGH: 0
HEADACHES: 0
HEMATURIA: 0
HEARTBURN: 0
HEMATOCHEZIA: 0
NERVOUS/ANXIOUS: 0
CHILLS: 0
DYSURIA: 0
DIARRHEA: 0
SHORTNESS OF BREATH: 0
FREQUENCY: 0
DIZZINESS: 0
ARTHRALGIAS: 0
PALPITATIONS: 0
CONSTIPATION: 0
EYE PAIN: 0
WEAKNESS: 0
SORE THROAT: 0

## 2018-06-13 ASSESSMENT — PAIN SCALES - GENERAL: PAINLEVEL: NO PAIN (0)

## 2018-06-13 NOTE — TELEPHONE ENCOUNTER
Left message for patient to return call please transfer to RN.    Needs lab and float scheduled.    Kelton Clements, RN, BSN

## 2018-06-13 NOTE — TELEPHONE ENCOUNTER
----- Message from PEARL Sneed CNP sent at 6/13/2018  2:12 PM CDT -----  Please let patient know that her sodium is quite lower than I would like to see it. Unclear etiology, small chance this is from her lisinopril. Recommend that we have her stop her lisinopril for right now and monitor her blood pressures daily at home if consistently over 140/90 or she is symptomatic (headaches, chest pain, SOB) then she needs to let me know. Recheck BMP on Friday with BP check. Please schedule.     PEARL Dominguez CNP

## 2018-06-13 NOTE — LETTER
June 25, 2018    Rosanna Ivory  [address] [place] [place] [address]    Dear Rosanna,  We are happy to inform you that your PAP smear result from 6/13/18 is normal.  We are now able to do a follow up test on PAP smears. The DNA test is for HPV (Human Papilloma Virus). Cervical cancer is closely linked with certain types of HPV. Your results showed no evidence of high risk HPV.  Therefore we recommend you return in 3 years for your next pap smear.  You will still need to return to the clinic every year for an annual exam and other preventive tests.  Please contact the clinic at [phone] with any questions.  Sincerely,    PEARL Dominguez CNP/rlm

## 2018-06-13 NOTE — MR AVS SNAPSHOT
After Visit Summary   6/13/2018    Rosanna Ivory    MRN: [id number]           Patient Information     Date Of Birth          1960        Visit Information        Provider Department      6/13/2018 8:40 AM Nae Pantoja APRN [facility]        Today's Diagnoses     Encounter for routine adult health examination without abnormal findings    -  1    Benign essential hypertension        Hyperlipidemia, unspecified hyperlipidemia type        Screening for malignant neoplasm of cervix        Screening for HIV (human immunodeficiency virus)        Need for hepatitis C screening test        Need for prophylactic vaccination with tetanus-diphtheria (TD)        Tobacco abuse        Tobacco abuse counseling        Need for vaccination        Encounter for vitamin deficiency screening        Personal history of tobacco use          Care Instructions      Preventive Health Recommendations  Female Ages 50 - 64    Yearly exam: See your health care provider every year in order to  o Review health changes.   o Discuss preventive care.    o Review your medicines if your doctor has prescribed any.      Get a Pap test every three years (unless you have an abnormal result and your provider advises testing more often).    If you get Pap tests with HPV test, you only need to test every 5 years, unless you have an abnormal result.     You do not need a Pap test if your uterus was removed (hysterectomy) and you have not had cancer.    You should be tested each year for STDs (sexually transmitted diseases) if you're at risk.     Have a mammogram every 1 to 2 years.    Have a colonoscopy at age 50, or have a yearly FIT test (stool test). These exams screen for colon cancer.      Have a cholesterol test every 5 years, or more often if advised.    Have a diabetes test (fasting glucose) every three years. If you are at risk for diabetes, you should have this test more often.     If you are at risk for  osteoporosis (brittle bone disease), think about having a bone density scan (DEXA).    Shots: Get a flu shot each year. Get a tetanus shot every 10 years.    Nutrition:     Eat at least 5 servings of fruits and vegetables each day.    Eat whole-grain bread, whole-wheat pasta and brown rice instead of white grains and rice.    Talk to your provider about Calcium and Vitamin D.     Lifestyle    Exercise at least 150 minutes a week (30 minutes a day, 5 days a week). This will help you control your weight and prevent disease.    Limit alcohol to one drink per day.    No smoking.     Wear sunscreen to prevent skin cancer.     See your dentist every six months for an exam and cleaning.    See your eye doctor every 1 to 2 years.      HOW TO QUIT SMOKING  Smoking is one of the hardest habits to break. About half of all those who have ever smoked have been able to quit, and most of those (about 70%) who still smoke want to quit. Here are some of the best ways to stop smoking.     KEEP TRYING:  It takes most smokers about 8 tries before they are finally able to fully quit. So, the more often you try and fail, the better your chance of quitting the next time! So, don't give up!    GO COLD TURKEY:  Most ex-smokers quit cold turkey. Trying to cut back gradually doesn't seem to work as well, perhaps because it continues the smoking habit. Also, it is possible to fool yourself by inhaling more while smoking fewer cigarettes. This results in the same amount of nicotine in your body!    GET SUPPORT:  Support programs can make an important difference, especially for the heavy smoker. These groups offer lectures, methods to change your behavior and peer support. Call the free national Quitline for more information. 800-QUIT-NOW (458-402-6410). Low-cost or free programs are offered by many hospitals, local chapters of the American Lung Association (940-364-1264) and the American Cancer Society (716-766-5567). Support at home is  important too. Non-smokers can help by offering praise and encouragement. If the smoker fails to quit, encourage them to try again!    OVER-THE-COUNTER MEDICINES:  For those who can't quit on their own, Nicotine Replacement Therapy (NRT) may make quitting much easier. Certain aids such as the nicotine patch, gum and lozenge are available without a prescription. However, it is best to use these under the guidance of your doctor. The skin patch provides a steady supply of nicotine to the body. Nicotine gum and lozenge gives temporary bursts of low levels of nicotine. Both methods take the edge off the craving for cigarettes. WARNING: If you feel symptoms of nicotine overdose, such as nausea, vomiting, dizziness, weakness, or fast heartbeat, stop using these and see your doctor.    PRESCRIPTION MEDICINES:  After evaluating your smoking patterns and prior attempts at quitting, your doctor may offer a prescription medicine such as bupropion (Zyban, Wellbutrin), varenicline (Chantix, Champix), a niocotine inhaler or nasal spray. Each has its unique advantage and side effects which your doctor can review with you.    HEALTH BENEFITS OF QUITTING:  The benefits of quitting start right away and keep improving the longer you go without smokin minutes: blood pressure and pulse return to normal  8 hours: oxygen levels return to normal  2 days: ability to smell and taste begins to improve as damaged nerves start to regrow  2-3 weeks: circulation and lung function improves  1-9 months: decreased cough, congestion and shortness of breath; less tired  1 year: risk of heart attack decreases by half  5 years: risk of lung cancer decreases by half; risk of stroke becomes the same as a non-smoker  For information about how to quit smoking, visit the following links:  National Cancer [place] ,   Clearing the Air, Quit Smoking Today   - an online booklet. http://www.smokefree.gov/pubs/clearing_the_air.pdf  Smokefree.gov  http://smokefree.gov/  QuitNet http://www.quitnet.com/    3207-8707 Anaid Arguello, [address], [place] [address]. All rights reserved. This information is not intended as a substitute for professional medical care. Always follow your healthcare professional's instructions.    The Benefits of Living Smoke Free  What do you want to gain from quitting? Check off some reasons to quit.  Health Benefits  ___ Reduce my risk of lung cancer, heart disease, chronic lung disease  ___ Have fewer wrinkles and softer skin  ___ Improve my sense of taste and smell  ___ For pregnant women--reduce the risk of having a miscarriage, stillbirth, premature birth, or low-birth-weight baby  Personal Benefits  ___ Feel more in control of my life  ___ Have better-smelling hair, breath, clothes, home, and car  ___ Save time by not having to take smoke breaks, buy cigarettes, or hunt for a light  ___ Have whiter teeth  Family Benefits  ___ Reduce my children s respiratory tract infections  ___ Set a good example for my children  ___ Reduce my family s cancer risk  Financial Benefits  ___ Save hundreds of dollars each year that would be spent on cigarettes  ___ Save money on medical bills  ___ Save on life, health, and car insurance premiums    Those Dollars Add Up!  Cigarettes are expensive, and getting more expensive all the time. Do you realize how much money you are spending on cigarettes per year? What is the average amount you spend on a pack of cigarettes? What is the average number of packs that you smoke per day? Using your answers to these questions, fill in this formula to help you find out:  ($ _____ per pack) ×  ( _____ number of packs per day) × (365 days) =  $ _____ yearly cost of smoking  Besides tobacco, there are other costs, including extra cleaning bills and replacement costs for clothing and furniture; medical expenses for smoking-related illnesses; and higher health, life, and car insurance  premiums.    Cigars and Pipes Count Too!  Cigars and pipes are also dangerous. So are smokeless (chewing) tobacco and snuff. All of these products contain nicotine, a highly addictive substance that has harmful effects on your body. Quitting smoking means giving up all tobacco products.      6607-1288 [employer] [facility], [address] [place] [address], [place] [address]. All rights reserved. This information is not intended as a substitute for professional medical care. Always follow your healthcare professional's instructions.          Follow-ups after your visit        Future tests that were ordered for you today     Open Future Orders        Priority Expected Expires Ordered    Vitamin D Deficiency Routine  8/11/2018 6/13/2018            Who to contact     If you have questions or need follow up information about today's clinic visit or your schedule please contact [facility] directly at [phone].  Normal or non-critical lab and imaging results will be communicated to you by [email or web address], letter or phone within 4 business days after the clinic has received the results. If you do not hear from us within 7 days, please contact the clinic through [email or web address] or phone. If you have a critical or abnormal lab result, we will notify you by phone as soon as possible.  Submit refill requests through BeThereRewards or call your pharmacy and they will forward the refill request to us. Please allow 3 business days for your refill to be completed.          Additional Information About Your Visit        [email or web address] Information     BeThereRewards gives you secure access to your electronic health record. If you see a primary care provider, you can also send messages to your care team and make appointments. If you have questions, please call your primary care clinic.  If you do not have a primary care provider, please call [phone] and they will assist you.        Care EveryWhere ID     This is your Care EveryWhere ID. This could be used by  "other organizations to access your [facility] medical records  [id number]        Your Vitals Were     Pulse Temperature Respirations Height BMI (Body Mass Index)       72 97.7  F (36.5  C) 16 5' 3.5\" (1.613 m) 22.84 kg/m2        Blood Pressure from Last 3 Encounters:   06/13/18 120/78   12/12/17 112/72   11/02/17 128/74    Weight from Last 3 Encounters:   06/13/18 131 lb (59.4 kg)   12/12/17 128 lb 9.6 oz (58.3 kg)   10/12/17 127 lb (57.6 kg)              We Performed the Following     1st  Administration  [84251]     Comprehensive metabolic panel     Each additional admin.  (Right click and add QUANTITY)  [57103]     Hepatitis C Screen Reflex to HCV RNA Quant and Genotype     HIV Screening     HPV High Risk Types DNA Cervical     Lipid panel reflex to direct LDL Fasting     Pap imaged thin layer screen with HPV - recommended age 30 - 65 years (select HPV order below)     Prof Fee: Shared Decision Making Visit for Lung Cancer Screening     TDAP VACCINE (ADACEL) [06020.002]     Tobacco Cessation - Order to Satisfy Health Maintenance        Primary Care Provider Office Phone # Fax #    Broderick Siu PA-C [phone]       [address] [place] [address]        Equal Access to Services     ROMAN HANNAH : Hadii aad ku hadasho Soomaali, waaxda luqadaha, qaybta kaalmada adeegyada, alice fiore . So [facility] [phone].    ATENCIÓN: Si habla español, tiene a lin disposición servicios gratuitos de asistencia lingüística. ame al [phone].    We comply with applicable federal civil rights laws and [place] laws. We do not discriminate on the basis of race, color, national origin, age, disability, sex, sexual orientation, or gender identity.            Thank you!     Thank you for choosing [facility]  for your care. Our goal is always to provide you with excellent care. Hearing back from our patients is one way we can continue to improve our " services. Please take a few minutes to complete the written survey that you may receive in the mail after your visit with us. Thank you!             Your Updated Medication List - Protect others around you: Learn how to safely use, store and throw away your medicines at www.disposemymeds.org.          This list is accurate as of 6/13/18  9:30 AM.  Always use your most recent med list.                   Brand Name Dispense Instructions for use Diagnosis    fluticasone 50 MCG/ACT spray    FLONASE    1 Package    USE TWO SPRAYS IN EACH NOSTRIL DAILY    Allergic rhinitis, cause unspecified       lisinopril 10 MG tablet    PRINIVIL/ZESTRIL    90 tablet    Take 1 tablet (10 mg) by mouth daily    Benign essential hypertension       omega-3 fatty acids 1200 MG capsule      Take four tablets daily

## 2018-06-14 ENCOUNTER — TELEPHONE (OUTPATIENT)
Dept: FAMILY MEDICINE | Facility: OTHER | Age: 58
End: 2018-06-14

## 2018-06-14 NOTE — TELEPHONE ENCOUNTER
----- Message from PEARL Sneed CNP sent at 6/14/2018 10:08 AM CDT -----    Please let patient know that her Cholesterol is up from previous readings. I would recommend diet and exercise to help with this. This includes avoiding sugary pops and foods, working on smoking cessation, daily exercise and avoiding fried foods.     PEARL Dominguez CNP

## 2018-06-15 LAB
COPATH REPORT: NORMAL
PAP: NORMAL

## 2018-06-18 LAB
FINAL DIAGNOSIS: NORMAL
HPV HR 12 DNA CVX QL NAA+PROBE: NEGATIVE
HPV16 DNA SPEC QL NAA+PROBE: NEGATIVE
HPV18 DNA SPEC QL NAA+PROBE: NEGATIVE
SPECIMEN DESCRIPTION: NORMAL
SPECIMEN SOURCE CVX/VAG CYTO: NORMAL

## 2018-06-20 DIAGNOSIS — R79.89 LOW SERUM SODIUM: ICD-10-CM

## 2018-06-20 DIAGNOSIS — Z13.21 ENCOUNTER FOR VITAMIN DEFICIENCY SCREENING: ICD-10-CM

## 2018-06-20 LAB
ANION GAP SERPL CALCULATED.3IONS-SCNC: 11 MMOL/L (ref 3–14)
BUN SERPL-MCNC: 10 MG/DL (ref 7–30)
CALCIUM SERPL-MCNC: 8.8 MG/DL (ref 8.5–10.1)
CHLORIDE SERPL-SCNC: 93 MMOL/L (ref 94–109)
CO2 SERPL-SCNC: 31 MMOL/L (ref 20–32)
CREAT SERPL-MCNC: 0.8 MG/DL (ref 0.52–1.04)
GFR SERPL CREATININE-BSD FRML MDRD: 75 ML/MIN/1.7M2
GLUCOSE SERPL-MCNC: 102 MG/DL (ref 70–99)
POTASSIUM SERPL-SCNC: 4 MMOL/L (ref 3.4–5.3)
SODIUM SERPL-SCNC: 135 MMOL/L (ref 133–144)

## 2018-06-20 PROCEDURE — 36415 COLL VENOUS BLD VENIPUNCTURE: CPT | Performed by: NURSE PRACTITIONER

## 2018-06-20 PROCEDURE — 80048 BASIC METABOLIC PNL TOTAL CA: CPT | Performed by: NURSE PRACTITIONER

## 2018-06-20 PROCEDURE — 82306 VITAMIN D 25 HYDROXY: CPT | Performed by: NURSE PRACTITIONER

## 2018-06-21 ENCOUNTER — TELEPHONE (OUTPATIENT)
Dept: FAMILY MEDICINE | Facility: OTHER | Age: 58
End: 2018-06-21

## 2018-06-21 DIAGNOSIS — I10 BENIGN ESSENTIAL HYPERTENSION: Primary | ICD-10-CM

## 2018-06-21 LAB — DEPRECATED CALCIDIOL+CALCIFEROL SERPL-MC: 32 UG/L (ref 20–75)

## 2018-06-21 NOTE — TELEPHONE ENCOUNTER
When she calls back you can also inform her her Vitamin D is normal and message below.     PEARL Dominguez CNP

## 2018-06-21 NOTE — TELEPHONE ENCOUNTER
----- Message from PEARL Sneed CNP sent at 6/21/2018  8:23 AM CDT -----  Sodium has now normalized.   PEARL Dominguez CNP

## 2018-06-22 NOTE — TELEPHONE ENCOUNTER
Yes she should restart her lisinopril. Recheck chemistry levels in 1 month.         PEARL Dominguez CNP

## 2018-06-22 NOTE — TELEPHONE ENCOUNTER
Spoke with pt and relayed information. Pt is wondering if she should restart her Lisinopril (she was taken off of it because of her sodium)

## 2018-10-11 NOTE — PROGRESS NOTES
"  SUBJECTIVE:   Rosanna Ivory is a 58 year old female who presents to clinic today for the following health issues:      HPI     Concern - Lump  Onset: 1 week - worsening two days ago    Description:   left armpit    Progression of Symptoms:  worsening    Accompanying Signs & Symptoms:  Sore  Gotten bigger - \"about the size of marble\"  \"Feels deep\"    Previous history of similar problem:   Not often, but has had ingrown hairs before    Precipitating factors:   Worsened by: pressure, when things rub on it    Alleviating factors:  Improved by: no    Therapies Tried and outcome: put a hot rag on it    \"*I tried squeezing it because I thought it was an ingrown hair\" but nothing came out. She noticed a small bump in the left arm pit a few months ago and it seemed to go away but over the past week it has grown in size and over the past few days has been red and tender. No fevers or chills. No breast masses.     Problem list and histories reviewed & adjusted, as indicated.  Additional history: none    ROS:  GENERAL: Denies fever, fatigue, weakness, weight gain, or weight loss.  SKIN: +Painful, red nodule in left arm pit.     OBJECTIVE:     /88  Pulse 73  Temp 96.8  F (36  C) (Temporal)  Resp 16  Wt 135 lb (61.2 kg)  SpO2 100%  BMI 23.54 kg/m2  Body mass index is 23.54 kg/(m^2).  GENERAL: healthy, alert and no distress  RESP: lungs clear to auscultation - no rales, rhonchi or wheezes  CV: regular rate and rhythm, normal S1 S2, no S3 or S4, no murmur, click or rub  SKIN: Erythematous, indurated, tender nodule over the left axilla with purulent material easily expressed from a central pore.     ASSESSMENT/PLAN:       ICD-10-CM    1. Infected cyst of skin L72.9 doxycycline (VIBRAMYCIN) 100 MG capsule    L08.9    2. Visit for screening mammogram Z12.31 MA SCREENING DIGITAL BILAT - Future  (s+30)       Tender left axillary nodule consistent with an infected cyst. The area was cleansed with Chloraprep and then " anesthetized with with 1% lidocaine with epinephrine. A 2 mm incision was made over the central pore with a 15 blade and a significant amount of thick, white purulent material was expresses. A cyst wall was also appreciated and completely dissected and removed with a Nae forceps. The wound was irrigated with normal saline and Bacitracin ointment was placed over the wound followed by a gauzed dampened with normal saline and a bandage. She was prescribe a 10 day course of doxycyline to further treat the infection and was instructed on daily wet to dry dressing changes for the next week along with washing the area in the shower daily with warm, soapy water. She will monitor for signs of a worsening infection or further purulent drainage. Follow up in 1 week for wound check.    Updated screening mammogram ordered.       Broderick Siu PA-C  [facility]

## 2018-10-12 ENCOUNTER — OFFICE VISIT (OUTPATIENT)
Dept: FAMILY MEDICINE | Facility: OTHER | Age: 58
End: 2018-10-12
Payer: COMMERCIAL

## 2018-10-12 VITALS
DIASTOLIC BLOOD PRESSURE: 88 MMHG | TEMPERATURE: 96.8 F | BODY MASS INDEX: 23.54 KG/M2 | HEART RATE: 73 BPM | WEIGHT: 135 LBS | RESPIRATION RATE: 16 BRPM | OXYGEN SATURATION: 100 % | SYSTOLIC BLOOD PRESSURE: 130 MMHG

## 2018-10-12 DIAGNOSIS — L08.9 INFECTED CYST OF SKIN: Primary | ICD-10-CM

## 2018-10-12 DIAGNOSIS — L72.9 INFECTED CYST OF SKIN: Primary | ICD-10-CM

## 2018-10-12 DIAGNOSIS — Z12.31 VISIT FOR SCREENING MAMMOGRAM: ICD-10-CM

## 2018-10-12 PROCEDURE — 10060 I&D ABSCESS SIMPLE/SINGLE: CPT | Performed by: PHYSICIAN ASSISTANT

## 2018-10-12 PROCEDURE — 99212 OFFICE O/P EST SF 10 MIN: CPT | Mod: 25 | Performed by: PHYSICIAN ASSISTANT

## 2018-10-12 RX ORDER — DOXYCYCLINE 100 MG/1
100 CAPSULE ORAL 2 TIMES DAILY
Qty: 20 CAPSULE | Refills: 0 | Status: SHIPPED | OUTPATIENT
Start: 2018-10-12 | End: 2018-10-27

## 2018-10-12 NOTE — PATIENT INSTRUCTIONS
Will prescribe an antibiotic to help fight the infection called doxycyline.  Take twice daily for 10 days.  Take a probiotic or daily Activia yogurt while on this.  Perform daily wet to dry dressings for 1 week then then follow up for a recheck.  Wash the area daily in the shower with warm, soapy water.  If you notice any worsening redness, pain, or drainage, let me know.

## 2018-10-12 NOTE — NURSING NOTE
"Chief Complaint   Patient presents with     Mass     Panel Management     honoring choices, vaccines, mammo       Initial /88  Pulse 73  Temp 96.8  F (36  C) (Temporal)  Resp 16  Wt 135 lb (61.2 kg)  SpO2 100%  BMI 23.54 kg/m2 Estimated body mass index is 23.54 kg/(m^2) as calculated from the following:    Height as of 6/13/18: 5' 3.5\" (1.613 m).    Weight as of this encounter: 135 lb (61.2 kg).  Medication Reconciliation: complete    Nany Alicea, PAULINA      "

## 2018-10-12 NOTE — MR AVS SNAPSHOT
After Visit Summary   10/12/2018    Rosanna Ivory    MRN: [id number]           Patient Information     Date Of Birth          1960        Visit Information        Provider Department      10/12/2018 3:00 PM Broderick Siu PA-C [facility]        Today's Diagnoses     Infected cyst of skin    -  1    Visit for screening mammogram          Care Instructions    Will prescribe an antibiotic to help fight the infection called doxycyline.  Take twice daily for 10 days.  Take a probiotic or daily Activia yogurt while on this.  Perform daily wet to dry dressings for 1 week then then follow up for a recheck.  Wash the area daily in the shower with warm, soapy water.  If you notice any worsening redness, pain, or drainage, let me know.             Follow-ups after your visit        Follow-up notes from your care team     Return in about 1 week (around 10/19/2018) for Routine Visit.      Your next 10 appointments already scheduled     Oct 19, 2018  9:15 AM CDT   Office Visit with Broderick Siu PA-C   [facility] ([facility])    [address] [place] [id number] [phone]           Bring a current list of meds and any records pertaining to this visit. For Physicals, please bring immunization records and any forms needing to be filled out. Please arrive 10 minutes early to complete paperwork.              Future tests that were ordered for you today     Open Future Orders        Priority Expected Expires Ordered    MA SCREENING DIGITAL BILAT - Future  (s+30) Routine  10/11/2019 10/12/2018            Who to contact     If you have questions or need follow up information about today's clinic visit or your schedule please contact [facility] directly at [phone].  Normal or non-critical lab and imaging results will be communicated to you by MyChart, letter or phone within 4 business days after the clinic has  received the results. If you do not hear from us within 7 days, please contact the clinic through Flurry or phone. If you have a critical or abnormal lab result, we will notify you by phone as soon as possible.  Submit refill requests through Flurry or call your pharmacy and they will forward the refill request to us. Please allow 3 business days for your refill to be completed.          Additional Information About Your Visit        [employer] Information     Flurry gives you secure access to your electronic health record. If you see a primary care provider, you can also send messages to your care team and make appointments. If you have questions, please call your primary care clinic.  If you do not have a primary care provider, please call [phone] and they will assist you.        Care EveryWhere ID     This is your Care EveryWhere ID. This could be used by other organizations to access your [facility] medical records  [id number]        Your Vitals Were     Pulse Temperature Respirations Pulse Oximetry BMI (Body Mass Index)       73 96.8  F (36  C) (Temporal) 16 100% 23.54 kg/m2        Blood Pressure from Last 3 Encounters:   10/12/18 130/88   06/13/18 120/78   12/12/17 112/72    Weight from Last 3 Encounters:   10/12/18 135 lb (61.2 kg)   06/13/18 131 lb (59.4 kg)   12/12/17 128 lb 9.6 oz (58.3 kg)                 Today's Medication Changes          These changes are accurate as of 10/12/18  3:55 PM.  If you have any questions, ask your nurse or doctor.               Start taking these medicines.        Dose/Directions    doxycycline 100 MG capsule   Commonly known as:  VIBRAMYCIN   Used for:  Infected cyst of skin   Started by:  Broderick Siu PA-C        Dose:  100 mg   Take 1 capsule (100 mg) by mouth 2 times daily   Quantity:  20 capsule   Refills:  0            Where to get your medicines      These medications were sent to [facility] [place], [place] - [address]  [address]  , [facility] [address]     Phone:  [phone]     doxycycline 100 MG capsule                Primary Care Provider Office Phone # Fax #    Broderick Siu PA-C [phone]       [address] [facility] [address]  [facility] [address]        Equal Access to Services     ROMAN HANNAH : Hadii bon ku hadasho Soomaali, waaxda luqadaha, qaybta kaalmada adeegyada, waxay shana hayliborio garza. So [facility] [phone].    ATENCIÓN: Si habla español, tiene a lin disposición servicios gratuitos de asistencia lingüística. [place] al [phone].    We comply with applicable federal civil rights laws and [place] laws. We do not discriminate on the basis of race, color, national origin, age, disability, sex, sexual orientation, or gender identity.            Thank you!     Thank you for choosing [facility]  for your care. Our goal is always to provide you with excellent care. Hearing back from our patients is one way we can continue to improve our services. Please take a few minutes to complete the written survey that you may receive in the mail after your visit with us. Thank you!             Your Updated Medication List - Protect others around you: Learn how to safely use, store and throw away your medicines at www.disposemymeds.org.          This list is accurate as of 10/12/18  3:55 PM.  Always use your most recent med list.                   Brand Name Dispense Instructions for use Diagnosis    doxycycline 100 MG capsule    VIBRAMYCIN    20 capsule    Take 1 capsule (100 mg) by mouth 2 times daily    Infected cyst of skin       fluticasone 50 MCG/ACT spray    FLONASE    1 Package    USE TWO SPRAYS IN EACH NOSTRIL DAILY    Allergic rhinitis, cause unspecified       lisinopril 10 MG tablet    PRINIVIL/ZESTRIL    90 tablet    Take 1 tablet (10 mg) by mouth daily    Benign essential hypertension       omega-3 fatty acids 1200 MG capsule      Take four tablets daily

## 2018-10-19 DIAGNOSIS — I10 BENIGN ESSENTIAL HYPERTENSION: ICD-10-CM

## 2018-10-19 RX ORDER — LISINOPRIL 10 MG/1
TABLET ORAL
Qty: 30 TABLET | Refills: 0 | Status: SHIPPED | OUTPATIENT
Start: 2018-10-19 | End: 2018-11-01

## 2018-10-19 NOTE — TELEPHONE ENCOUNTER
"Requested Prescriptions   Pending Prescriptions Disp Refills     lisinopril (PRINIVIL/ZESTRIL) 10 MG tablet [Pharmacy Med Name: LISINOPRIL 10 MG TABLET] 90 tablet 3     Sig: TAKE 1 TABLET (10 MG) BY MOUTH DAILY    ACE Inhibitors (Including Combos) Protocol Passed    10/19/2018  2:27 AM       Passed - Blood pressure under 140/90 in past 12 months    BP Readings from Last 3 Encounters:   10/12/18 130/88   06/13/18 120/78   12/12/17 112/72          Passed - Recent (12 mo) or future (30 days) visit within the authorizing provider's specialty    Patient had office visit in the last 12 months or has a visit in the next 30 days with authorizing provider or within the authorizing provider's specialty.  See \"Patient Info\" tab in inbasket, or \"Choose Columns\" in Meds & Orders section of the refill encounter.         Passed - Patient is age 18 or older       Passed - No active pregnancy on record       Passed - Normal serum creatinine on file in past 12 months    Recent Labs   Lab Test  06/20/18   1556   CR  0.80          Passed - Normal serum potassium on file in past 12 months    Recent Labs   Lab Test  06/20/18   1556   POTASSIUM  4.0          Passed - No positive pregnancy test in past 12 months      lisinopril (PRINIVIL/ZESTRIL) 10 MG tablet  Medication is being filled for 1 time refill only due to:  Patient needs to be seen because due for physical.    Next 5 appointments (look out 90 days)     Oct 24, 2018  9:00 AM CDT   Office Visit with Broderick Siu PA-C   [facility]    [address]  [facility] [address]   [phone]                Juliet Blanco, RN, BSN     "

## 2018-10-27 ENCOUNTER — HOSPITAL ENCOUNTER (OUTPATIENT)
Facility: CLINIC | Age: 58
Setting detail: OBSERVATION
Discharge: HOME | End: 2018-10-28
Attending: EMERGENCY MEDICINE | Admitting: FAMILY MEDICINE
Payer: COMMERCIAL

## 2018-10-27 ENCOUNTER — APPOINTMENT (OUTPATIENT)
Dept: CT IMAGING | Facility: CLINIC | Age: 58
End: 2018-10-27
Attending: EMERGENCY MEDICINE
Payer: COMMERCIAL

## 2018-10-27 DIAGNOSIS — R11.2 NON-INTRACTABLE VOMITING WITH NAUSEA, UNSPECIFIED VOMITING TYPE: ICD-10-CM

## 2018-10-27 DIAGNOSIS — R42 DIZZINESS: ICD-10-CM

## 2018-10-27 DIAGNOSIS — R42 VERTIGO: ICD-10-CM

## 2018-10-27 PROBLEM — F17.210 CIGARETTE SMOKER: Status: ACTIVE | Noted: 2018-10-27

## 2018-10-27 LAB
ALBUMIN SERPL-MCNC: 3.7 G/DL (ref 3.4–5)
ALP SERPL-CCNC: 128 U/L (ref 40–150)
ALT SERPL W P-5'-P-CCNC: 24 U/L (ref 0–50)
ANION GAP SERPL CALCULATED.3IONS-SCNC: 11 MMOL/L (ref 3–14)
AST SERPL W P-5'-P-CCNC: 24 U/L (ref 0–45)
BASOPHILS # BLD AUTO: 0 10E9/L (ref 0–0.2)
BASOPHILS NFR BLD AUTO: 0.6 %
BILIRUB SERPL-MCNC: 0.5 MG/DL (ref 0.2–1.3)
BUN SERPL-MCNC: 6 MG/DL (ref 7–30)
CALCIUM SERPL-MCNC: 8.5 MG/DL (ref 8.5–10.1)
CHLORIDE SERPL-SCNC: 100 MMOL/L (ref 94–109)
CO2 SERPL-SCNC: 24 MMOL/L (ref 20–32)
CREAT SERPL-MCNC: 0.52 MG/DL (ref 0.52–1.04)
DIFFERENTIAL METHOD BLD: ABNORMAL
EOSINOPHIL NFR BLD AUTO: 1.3 %
ERYTHROCYTE [DISTWIDTH] IN BLOOD BY AUTOMATED COUNT: 11.4 % (ref 10–15)
GFR SERPL CREATININE-BSD FRML MDRD: >90 ML/MIN/1.7M2
GLUCOSE SERPL-MCNC: 88 MG/DL (ref 70–99)
HCT VFR BLD AUTO: 42.8 % (ref 35–47)
HGB BLD-MCNC: 14.5 G/DL (ref 11.7–15.7)
IMM GRANULOCYTES # BLD: 0 10E9/L (ref 0–0.4)
IMM GRANULOCYTES NFR BLD: 0.3 %
LYMPHOCYTES # BLD AUTO: 0.7 10E9/L (ref 0.8–5.3)
LYMPHOCYTES NFR BLD AUTO: 11.8 %
MCH RBC QN AUTO: 31.2 PG (ref 26.5–33)
MCHC RBC AUTO-ENTMCNC: 33.9 G/DL (ref 31.5–36.5)
MCV RBC AUTO: 92 FL (ref 78–100)
MONOCYTES # BLD AUTO: 0.4 10E9/L (ref 0–1.3)
MONOCYTES NFR BLD AUTO: 5.6 %
NEUTROPHILS # BLD AUTO: 5 10E9/L (ref 1.6–8.3)
NEUTROPHILS NFR BLD AUTO: 80.4 %
NRBC # BLD AUTO: 0 10*3/UL
NRBC BLD AUTO-RTO: 0 /100
PLATELET # BLD AUTO: 291 10E9/L (ref 150–450)
POTASSIUM SERPL-SCNC: 4.1 MMOL/L (ref 3.4–5.3)
PROT SERPL-MCNC: 8 G/DL (ref 6.8–8.8)
RBC # BLD AUTO: 4.65 10E12/L (ref 3.8–5.2)
SODIUM SERPL-SCNC: 135 MMOL/L (ref 133–144)
WBC # BLD AUTO: 6.3 10E9/L (ref 4–11)

## 2018-10-27 PROCEDURE — 96361 HYDRATE IV INFUSION ADD-ON: CPT

## 2018-10-27 PROCEDURE — G0378 HOSPITAL OBSERVATION PER HR: HCPCS

## 2018-10-27 PROCEDURE — 25000132 ZZH RX MED GY IP 250 OP 250 PS 637: Performed by: EMERGENCY MEDICINE

## 2018-10-27 PROCEDURE — 99219 ZZC INITIAL OBSERVATION CARE,LEVL II: CPT | Performed by: FAMILY MEDICINE

## 2018-10-27 PROCEDURE — 80053 COMPREHEN METABOLIC PANEL: CPT | Performed by: EMERGENCY MEDICINE

## 2018-10-27 PROCEDURE — 85025 COMPLETE CBC W/AUTO DIFF WBC: CPT | Performed by: EMERGENCY MEDICINE

## 2018-10-27 PROCEDURE — 96374 THER/PROPH/DIAG INJ IV PUSH: CPT

## 2018-10-27 PROCEDURE — 99285 EMERGENCY DEPT VISIT HI MDM: CPT | Mod: 25

## 2018-10-27 PROCEDURE — 99285 EMERGENCY DEPT VISIT HI MDM: CPT | Mod: 25 | Performed by: EMERGENCY MEDICINE

## 2018-10-27 PROCEDURE — 25000128 H RX IP 250 OP 636: Performed by: EMERGENCY MEDICINE

## 2018-10-27 PROCEDURE — 96375 TX/PRO/DX INJ NEW DRUG ADDON: CPT

## 2018-10-27 PROCEDURE — 70450 CT HEAD/BRAIN W/O DYE: CPT

## 2018-10-27 RX ORDER — MECLIZINE HCL 12.5 MG 12.5 MG/1
12.5 TABLET ORAL 4 TIMES DAILY PRN
Qty: 30 TABLET | Refills: 0 | Status: SHIPPED | OUTPATIENT
Start: 2018-10-27 | End: 2018-10-27

## 2018-10-27 RX ORDER — NALOXONE HYDROCHLORIDE 0.4 MG/ML
.1-.4 INJECTION, SOLUTION INTRAMUSCULAR; INTRAVENOUS; SUBCUTANEOUS
Status: DISCONTINUED | OUTPATIENT
Start: 2018-10-27 | End: 2018-10-28 | Stop reason: HOSPADM

## 2018-10-27 RX ORDER — LORAZEPAM 2 MG/ML
1 INJECTION INTRAMUSCULAR EVERY 4 HOURS PRN
Status: DISCONTINUED | OUTPATIENT
Start: 2018-10-27 | End: 2018-10-28 | Stop reason: HOSPADM

## 2018-10-27 RX ORDER — LORAZEPAM 1 MG/1
1 TABLET ORAL EVERY 4 HOURS PRN
Status: DISCONTINUED | OUTPATIENT
Start: 2018-10-27 | End: 2018-10-28 | Stop reason: HOSPADM

## 2018-10-27 RX ORDER — ONDANSETRON 4 MG/1
4 TABLET, ORALLY DISINTEGRATING ORAL EVERY 6 HOURS PRN
Status: DISCONTINUED | OUTPATIENT
Start: 2018-10-27 | End: 2018-10-28 | Stop reason: HOSPADM

## 2018-10-27 RX ORDER — MECLIZINE HCL 12.5 MG 12.5 MG/1
25 TABLET ORAL 3 TIMES DAILY PRN
Status: DISCONTINUED | OUTPATIENT
Start: 2018-10-27 | End: 2018-10-28 | Stop reason: HOSPADM

## 2018-10-27 RX ORDER — CETIRIZINE HYDROCHLORIDE 10 MG/1
10 TABLET ORAL ONCE
Status: DISCONTINUED | OUTPATIENT
Start: 2018-10-27 | End: 2018-10-28 | Stop reason: HOSPADM

## 2018-10-27 RX ORDER — DEXAMETHASONE SODIUM PHOSPHATE 10 MG/ML
10 INJECTION, SOLUTION INTRAMUSCULAR; INTRAVENOUS ONCE
Status: COMPLETED | OUTPATIENT
Start: 2018-10-27 | End: 2018-10-27

## 2018-10-27 RX ORDER — ACETAMINOPHEN 325 MG/1
650 TABLET ORAL EVERY 4 HOURS PRN
Status: DISCONTINUED | OUTPATIENT
Start: 2018-10-27 | End: 2018-10-28 | Stop reason: HOSPADM

## 2018-10-27 RX ORDER — MECLIZINE HYDROCHLORIDE 25 MG/1
25 TABLET ORAL ONCE
Status: COMPLETED | OUTPATIENT
Start: 2018-10-27 | End: 2018-10-27

## 2018-10-27 RX ORDER — LISINOPRIL 10 MG/1
10 TABLET ORAL DAILY
Status: DISCONTINUED | OUTPATIENT
Start: 2018-10-28 | End: 2018-10-28 | Stop reason: HOSPADM

## 2018-10-27 RX ORDER — LORAZEPAM 2 MG/ML
1 INJECTION INTRAMUSCULAR ONCE
Status: COMPLETED | OUTPATIENT
Start: 2018-10-27 | End: 2018-10-27

## 2018-10-27 RX ORDER — SODIUM CHLORIDE 9 MG/ML
INJECTION, SOLUTION INTRAVENOUS CONTINUOUS
Status: DISCONTINUED | OUTPATIENT
Start: 2018-10-27 | End: 2018-10-28 | Stop reason: HOSPADM

## 2018-10-27 RX ORDER — LISINOPRIL 10 MG/1
10 TABLET ORAL ONCE
Status: COMPLETED | OUTPATIENT
Start: 2018-10-27 | End: 2018-10-27

## 2018-10-27 RX ORDER — ONDANSETRON 2 MG/ML
4 INJECTION INTRAMUSCULAR; INTRAVENOUS EVERY 6 HOURS PRN
Status: DISCONTINUED | OUTPATIENT
Start: 2018-10-27 | End: 2018-10-28 | Stop reason: HOSPADM

## 2018-10-27 RX ADMIN — ACETAMINOPHEN 650 MG: 325 TABLET ORAL at 19:37

## 2018-10-27 RX ADMIN — LORAZEPAM 1 MG: 1 TABLET ORAL at 21:46

## 2018-10-27 RX ADMIN — SODIUM CHLORIDE 1000 ML: 9 INJECTION, SOLUTION INTRAVENOUS at 09:54

## 2018-10-27 RX ADMIN — LISINOPRIL 10 MG: 10 TABLET ORAL at 13:15

## 2018-10-27 RX ADMIN — DEXAMETHASONE SODIUM PHOSPHATE 10 MG: 10 INJECTION, SOLUTION INTRAMUSCULAR; INTRAVENOUS at 13:00

## 2018-10-27 RX ADMIN — SODIUM CHLORIDE: 9 INJECTION, SOLUTION INTRAVENOUS at 17:58

## 2018-10-27 RX ADMIN — MECLIZINE HYDROCHLORIDE 25 MG: 25 TABLET ORAL at 15:05

## 2018-10-27 RX ADMIN — LORAZEPAM 1 MG: 2 INJECTION INTRAMUSCULAR; INTRAVENOUS at 09:55

## 2018-10-27 NOTE — ED TRIAGE NOTES
Presents with nausea and vomiting. States she awoke in the night and her ear felt plugged causing her to feel unsteady. This AM she is dizzy and has had intermittent episodes of vomiting.

## 2018-10-27 NOTE — ED PROVIDER NOTES
History     Chief Complaint   Patient presents with     Dizziness     nausea     The history is provided by the patient and medical records.     This is a 58-year-old female with history of hypertension presenting with dizziness.  Patient was at an indoor golf facility last night with her family.  She noted that her right ear felt plugged and some of the sounds was in the facility seemed amplified.  This continued even after leaving.  When she got home, she noted that she seemed to have a little bit of stumbling when she was walking.  She acknowledges that she had some alcohol to drink, but denies significant use.  She woke during the night to try to use the bathroom but noted severe room spinning.  She had to have her  help her get up and walk to the bathroom.  She has continued to have symptoms of room spinning dizziness and has developed associated nausea and vomiting.  She vomited twice at home and once in triage.  She feels slightly better when sitting still but much worse when trying to turn her head.  She thinks it is worse if looking or turning to the right.  She notes a dull headache and continues to have some fullness and ear plugged sensation in her right ear.  She does have seasonal allergies and uses Flonase daily.  She denies severe headache, fevers, chills, vision blurriness or changes, new sinus pain or pressure, difficulty swallowing, neck pain, chest pain, shortness of breath, back pain, abdominal pain, diarrhea, constipation, urinary symptoms, weakness, numbness or tingling of the extremities.  She is not on blood thinners.  She does smoke about half pack per day.  She denies illicit drug use.  No history of vertigo.    Problem List:    Patient Active Problem List    Diagnosis Date Noted     Alcohol dependence (H) 06/13/2018     Priority: Medium     Benign essential hypertension 10/12/2017     Priority: Medium     CARDIOVASCULAR SCREENING; LDL GOAL LESS THAN 160 10/31/2010     Priority:  Medium     Screening for cardiovascular condition 10/31/2010     Priority: Medium     Irregular menstrual bleeding 2010     Priority: Medium     External hemorrhoids with other complication 2008     Priority: Medium     External hemorrhoids      Priority: Medium     Problem list name updated by automated process. Provider to review       Hyperlipidemia 2002     Priority: Medium     Allergic rhinitis 2001     Priority: Medium     Problem list name updated by automated process. Provider to review          Past Medical History:    Past Medical History:   Diagnosis Date     Alcohol abuse      Allergic rhinitis, cause unspecified      ASCUS on Pap smear 7/30/10     Esophageal reflux      External hemorrhoids without mention of complication      History of colposcopy with cervical biopsy 8/23/10     Other chest pain      Pure hypercholesterolemia        Past Surgical History:    Past Surgical History:   Procedure Laterality Date     C  DELIVERY ONLY      , Low Cervical     COLONOSCOPY  02/01/10    with snare polypectomy     HC CARDIAC STRESS TST,COMPLETE      normal per pt     HC HEMORRHOIDECTOMY,INT/EXT,SIMPLE  02/18/10       Family History:    Family History   Problem Relation Age of Onset     Diabetes Maternal Grandmother      Hypertension Maternal Grandmother      Hypertension Father      Asthma Father      GASTROINTESTINAL DISEASE Father      hiatal hernia     Diabetes Father      adult     Prostate Cancer Paternal Grandfather      Circulatory Paternal Grandfather      anyurism     Obesity Sister      Lipids Sister      Alzheimer Disease Mother      Breast Cancer Maternal Aunt      after menopause     Breast Cancer Paternal Aunt      after menopause     C.A.D. No family hx of        Social History:  Marital Status:   [2]  Social History   Substance Use Topics     Smoking status: Current Every Day Smoker     Packs/day: 0.50     Years: 6.00     Types:  Cigarettes     Smokeless tobacco: Never Used     Alcohol use 15.0 oz/week     30 Cans of beer per week        Medications:      fluticasone (FLONASE) 50 MCG/ACT nasal spray   lisinopril (PRINIVIL/ZESTRIL) 10 MG tablet   meclizine (ANTIVERT) 12.5 MG tablet   OMEGA-3 FATTY ACIDS 1200 MG OR CAPS         Review of Systems   All other ROS reviewed and are negative or non-contributory except as stated in HPI.     Physical Exam   BP: (!) 170/105  Heart Rate: 70  Temp: 97  F (36.1  C)  Resp: 14  SpO2: 97 %      Physical Exam   Constitutional: She is oriented to person, place, and time. She appears well-developed and well-nourished.   Thin, mildly anxious, healthy-appearing female sitting upright in very still on the bed, looking forward.   HENT:   Head: Normocephalic.   Left Ear: External ear normal.   Nose: Nose normal.   Patient has a little bit of fullness to the right TM, but no evidence for infection.  Bilateral canals clear.  Tacky mucous membranes   Eyes: Conjunctivae and EOM are normal. Pupils are equal, round, and reactive to light. No scleral icterus.   Patient has just a couple of beats of nystagmus to the right   Neck: Normal range of motion. Neck supple.   She has normal range of motion of the neck but notes increased symptomatology when rotating her head bilaterally, right greater than left   Cardiovascular: Normal rate, regular rhythm, normal heart sounds and intact distal pulses.    Pulmonary/Chest: Effort normal and breath sounds normal.   Abdominal: Soft. There is no tenderness.   Musculoskeletal: Normal range of motion. She exhibits no edema or tenderness.   Neurological: She is alert and oriented to person, place, and time. No cranial nerve deficit. She exhibits normal muscle tone. Coordination normal.   Skin: Skin is warm and dry. She is not diaphoretic.   Psychiatric: Her behavior is normal.   Mildly anxious   Vitals reviewed.      ED Course (with Medical Decision Making)    Pt seen and examined by me.  " RN and EPIC notes reviewed.      Patient with dizziness symptoms, associated nausea and vomiting, sensation of right ear plugging.  No evidence for ear infection.  Concern for vertigo, likely inner ear related.  Differential does include stroke.  While I was examining patient, she did have episode of significant vomiting.  IV placed for fluids.  I am going to give her a dose of Ativan.  Check basic labs.    Labs are completely normal.  Patient feeling somewhat better and able to rest.  I elected to do a CT scan.  She is hypertensive but did not take her a.m. lisinopril.  Blood pressures improving after fluids and Ativan.    CT scan shows no acute findings.    Patient feeling moderately better.  We did attempt to do the Epley maneuver, but part way through, patient had more episodes of vomiting.  She was again monitored.  She was given a dose of Decadron for possible vestibular neuronitis.    I reexamined the patient, and she was feeling somewhat better.  We did get her up to use the bathroom.  I also had her try to walk around the department.  Patient would like to try some meclizine.  Prior to this, she had declined any antihistamines that she notes they make her feel quite \"jittery\" inside.    I came back again to examine patient.  She again was having vomiting symptoms after using the bathroom.  She does feel that her dizziness is improved but continues to have some dizziness with the vomiting.  She has been observed for 7 hours.  She continues to have symptoms whenever she gets up and I have decided to admit her observation status with the plan for continued medications and monitoring.  If she is not improved by morning, she may need MRI and physical therapy evaluation.  Patient and her  aware of the plan.  I spoke with Dr. Zafar, hospitalist.  Patient is stable.     Procedures    Results for orders placed or performed during the hospital encounter of 10/27/18 (from the past 24 hour(s))   CBC with " platelets differential   Result Value Ref Range    WBC 6.3 4.0 - 11.0 10e9/L    RBC Count 4.65 3.8 - 5.2 10e12/L    Hemoglobin 14.5 11.7 - 15.7 g/dL    Hematocrit 42.8 35.0 - 47.0 %    MCV 92 78 - 100 fl    MCH 31.2 26.5 - 33.0 pg    MCHC 33.9 31.5 - 36.5 g/dL    RDW 11.4 10.0 - 15.0 %    Platelet Count 291 150 - 450 10e9/L    Diff Method Automated Method     % Neutrophils 80.4 %    % Lymphocytes 11.8 %    % Monocytes 5.6 %    % Eosinophils 1.3 %    % Basophils 0.6 %    % Immature Granulocytes 0.3 %    Nucleated RBCs 0 0 /100    Absolute Neutrophil 5.0 1.6 - 8.3 10e9/L    Absolute Lymphocytes 0.7 (L) 0.8 - 5.3 10e9/L    Absolute Monocytes 0.4 0.0 - 1.3 10e9/L    Absolute Basophils 0.0 0.0 - 0.2 10e9/L    Abs Immature Granulocytes 0.0 0 - 0.4 10e9/L    Absolute Nucleated RBC 0.0    Comprehensive metabolic panel   Result Value Ref Range    Sodium 135 133 - 144 mmol/L    Potassium 4.1 3.4 - 5.3 mmol/L    Chloride 100 94 - 109 mmol/L    Carbon Dioxide 24 20 - 32 mmol/L    Anion Gap 11 3 - 14 mmol/L    Glucose 88 70 - 99 mg/dL    Urea Nitrogen 6 (L) 7 - 30 mg/dL    Creatinine 0.52 0.52 - 1.04 mg/dL    GFR Estimate >90 >60 mL/min/1.7m2    GFR Estimate If Black >90 >60 mL/min/1.7m2    Calcium 8.5 8.5 - 10.1 mg/dL    Bilirubin Total 0.5 0.2 - 1.3 mg/dL    Albumin 3.7 3.4 - 5.0 g/dL    Protein Total 8.0 6.8 - 8.8 g/dL    Alkaline Phosphatase 128 40 - 150 U/L    ALT 24 0 - 50 U/L    AST 24 0 - 45 U/L   Head CT w/o contrast    Narrative    CT SCAN OF THE HEAD WITHOUT CONTRAST   10/27/2018 11:00 AM     HISTORY: Dizziness.    TECHNIQUE: Axial images of the head and coronal reformations without  IV contrast material.  Radiation dose for this scan was reduced using  automated exposure control, adjustment of the mA and/or kV according  to patient size, or iterative reconstruction technique.    COMPARISON: None.    FINDINGS: The ventricles are normal in size, shape and configuration.   The brain parenchyma and subarachnoid spaces  are normal. There is no  evidence of intracranial hemorrhage, mass, acute infarct or anomaly.     The visualized portions of the sinuses and mastoids appear normal.  There is no evidence of trauma.      Impression    IMPRESSION: Normal CT scan of the head.      ASA QURESHI MD       Medications   sodium chloride (PF) 0.9% PF flush 3 mL (not administered)   cetirizine (zyrTEC) tablet 10 mg (10 mg Oral Not Given 10/27/18 1134)   0.9% sodium chloride BOLUS (0 mLs Intravenous Stopped 10/27/18 1054)   LORazepam (ATIVAN) injection 1 mg (1 mg Intravenous Given 10/27/18 0955)   dexamethasone PF (DECADRON) injection 10 mg (10 mg Intravenous Given 10/27/18 1300)   lisinopril (PRINIVIL/ZESTRIL) tablet 10 mg (10 mg Oral Given 10/27/18 1315)   meclizine (ANTIVERT) tablet 25 mg (25 mg Oral Given 10/27/18 1505)       Assessments & Plan     I have reviewed the findings, diagnosis, plan with the patient.    New Prescriptions    MECLIZINE (ANTIVERT) 12.5 MG TABLET    Take 1 tablet (12.5 mg) by mouth 4 times daily as needed for dizziness       Final diagnoses:   Dizziness   Vertigo   Non-intractable vomiting with nausea, unspecified vomiting type     Disposition: Patient admitted observation status in stable condition.    Note: Chart documentation done in part with Dragon Voice Recognition software. Although reviewed after completion, some word and grammatical errors may remain.     10/27/2018   [facility] EMERGENCY DEPARTMENT     Bhavya Rolle MD  10/27/18 4087

## 2018-10-27 NOTE — ED NOTES
ED Nursing criteria listed below was addressed during verbal handoff:     Abnormal vitals: Yes B/P  Abnormal results: No  Med Reconciliation completed: Yes  Meds given in ED: Yes  Any Overdue Meds: No  Core Measures: No  Isolation: No  Special needs: Yes fall risk  Skin assessment: Yes    Observation Patient  Education provided: Yes

## 2018-10-27 NOTE — ED NOTES
Had patient up and walking still very dizzy. Patient was able to walk around holding my hand but did loose balance a little when we went around the corner.

## 2018-10-27 NOTE — ED AVS SNAPSHOT
[facility] Emergency Department    [address] [place] [address]    Phone:  [phone]    Fax:  [phone]                                       Rosanna Ivory   MRN: [id number]    Department:  [facility] Emergency Department   Date of Visit:  10/27/2018           After Visit Summary Signature Page     I have received my discharge instructions, and my questions have been answered. I have discussed any challenges I see with this plan with the nurse or doctor.    ..........................................................................................................................................  Patient/Patient Representative Signature      ..........................................................................................................................................  Patient Representative Print Name and Relationship to Patient    ..................................................               ................................................  Date                                   Time    ..........................................................................................................................................  Reviewed by Signature/Title    ...................................................              ..............................................  Date                                               Time          22EPIC Rev 08/18

## 2018-10-27 NOTE — ED AVS SNAPSHOT
[facility] Emergency Department    [address] [place] [address]    Phone:  [phone]    Fax:  [phone]                                       Rosanna Ivory   MRN: [id number]    Department:  [facility] Emergency Department   Date of Visit:  10/27/2018           Patient Information     Date Of Birth          1960        Your diagnoses for this visit were:     Dizziness     Vertigo     Non-intractable vomiting with nausea, unspecified vomiting type        You were seen by Bhavya Rolle MD.      Follow-up Information     Schedule an appointment as soon as possible for a visit with Broderick Siu PA-C.    Specialty:  Physician Assistant    Contact information:    [address]  [facility] [address] [phone]          Discharge Instructions       Try meclizine as needed for dizziness.    Drink plenty of fluids.  Get lots of rest.    I am going to send a message to primary care provider for follow-up, possible physical therapy.    In the meantime, if you have any worsening, changes or concerns return at any time.    I hope that you start to feel much better quickly!!!    Discharge References/Attachments     VERTIGO, UNSPECIFIED (ENGLISH)      24 Hour Appointment Hotline       To make an appointment at any [facility], call [phone] ([phone]). If you don't have a family doctor or clinic, we will help you find one. [facility] are conveniently located to serve the needs of you and your family.             Review of your medicines      START taking        Dose / Directions Last dose taken    meclizine 12.5 MG tablet   Commonly known as:  ANTIVERT   Dose:  12.5 mg   Quantity:  30 tablet        Take 1 tablet (12.5 mg) by mouth 4 times daily as needed for dizziness   Refills:  0          Our records show that you are taking the medicines listed below. If these are incorrect, please call your family doctor or clinic.        Dose /  Directions Last dose taken    fluticasone 50 MCG/ACT spray   Commonly known as:  FLONASE   Quantity:  1 Package        USE TWO SPRAYS IN EACH NOSTRIL DAILY   Refills:  prn        lisinopril 10 MG tablet   Commonly known as:  PRINIVIL/ZESTRIL   Quantity:  30 tablet        TAKE 1 TABLET (10 MG) BY MOUTH DAILY   Refills:  0        omega-3 fatty acids 1200 MG capsule        Take four tablets daily   Refills:  0                Prescriptions were sent or printed at these locations (1 Prescription)                   Other Prescriptions                Printed at Department/Unit printer (1 of 1)         meclizine (ANTIVERT) 12.5 MG tablet                Procedures and tests performed during your visit     CBC with platelets differential    Comprehensive metabolic panel    Head CT w/o contrast      Orders Needing Specimen Collection     None      Pending Results     No orders found from 10/25/2018 to 10/28/2018.            Pending Culture Results     No orders found from 10/25/2018 to 10/28/2018.            Pending Results Instructions     If you had any lab results that were not finalized at the time of your Discharge, you can call the ED Lab Result RN at [phone]. You will be contacted by this team for any positive Lab results or changes in treatment. The nurses are available 7 days a week from 10A to 6:30P.  You can leave a message 24 hours per day and they will return your call.        Thank you for choosing [facility]       Thank you for choosing [facility] for your care. Our goal is always to provide you with excellent care. Hearing back from our patients is one way we can continue to improve our services. Please take a few minutes to complete the written survey that you may receive in the mail after you visit with us. Thank you!        [employer] Information     [employer] gives you secure access to your electronic health record. If you see a primary care provider, you can also send messages to your care team and make  appointments. If you have questions, please call your primary care clinic.  If you do not have a primary care provider, please call [phone] and they will assist you.        Care EveryWhere ID     This is your Care EveryWhere ID. This could be used by other organizations to access your [facility] medical records  [id number]        Equal Access to Services     ROMAN HANNAH : Laure Rowland, tavia campos, alice pino . So [facility] [phone].    ATENCIÓN: Si habla español, tiene a lin disposición servicios gratuitos de asistencia lingüística. Llame al [phone].    We comply with applicable federal civil rights laws and [place] laws. We do not discriminate on the basis of race, color, national origin, age, disability, sex, sexual orientation, or gender identity.            After Visit Summary       This is your record. Keep this with you and show to your community pharmacist(s) and doctor(s) at your next visit.

## 2018-10-27 NOTE — PROGRESS NOTES
S-(situation): Patient registered to Observation. Patient arrived to room 268 via cart from ED    B-(background): Nausea/vertigo    A-(assessment): Pt is A&O.  B/P high 161/102.  Afebrile.  No nausea at this time.  Had emesis in ED.  No dizziness noted.  Has good Bowel sounds.      R-(recommendations): Orders and observation goals reviewed with pt and     Nursing Observation criteria listed below was met:    Skin issues/needs documented:NA  Isolation needs addressed, if appropriate: NA  Fall Prevention: Education given and documented: Yes  Education Assessment documented:Yes  Education Documented (Pre-existing chronic infection such as, MRSA/VRE need education on admission): Yes  OBS video/handout Reviewed & DocumentedYes  Medication Reconciliation Complete: Yes  New medication patient education completed and documented (Possible Side Effects of Common Medications handout): No no new meds  Home medications if not able to send immediately home with family stored here: NA  Reminder note placed in discharge instructions: NA  Patient has discharge needs (If yes, please explain): No

## 2018-10-27 NOTE — IP AVS SNAPSHOT
MRN:[id number]                      After Visit Summary   10/27/2018    Rosanna Ivory    MRN: [id number]           Thank you!     Thank you for choosing [facility] for your care. Our goal is always to provide you with excellent care. Hearing back from our patients is one way we can continue to improve our services. Please take a few minutes to complete the written survey that you may receive in the mail after you visit with us. Thank you!        Patient Information     Date Of Birth          1960        About your hospital stay     You were admitted on:  October 27, 2018 You last received care in the:  [address] [facility]    You were discharged on:  October 28, 2018       Who to Call     For medical emergencies, please call 911.  For non-urgent questions about your medical care, please call your primary care provider or clinic, [phone]          Attending Provider     Provider Specialty    Bhavya Rolle MD Emergency Medicine    Leanna Zafar MD Family Practice       Primary Care Provider Office Phone # Fax #    Broderick Siu PA-C [phone]      After Care Instructions     Activity       Your activity upon discharge: activity as tolerated            Diet       Follow this diet upon discharge: Orders Placed This Encounter      Room Service      Advance Diet as Tolerated: Regular Diet Adult                  Follow-up Appointments     Follow-up and recommended labs and tests        Follow up with primary care provider, Broderick Siu, within 7 days for hospital follow- up.  No follow up labs or test are needed.                  Your next 10 appointments already scheduled     Nov 01, 2018 10:30 AM CDT   Office Visit with Broderick Siu PA-C   [facility] ([facility])    [address] [place] [id number]   [phone]           Bring a current list of meds and any records  "pertaining to this visit. For Physicals, please bring immunization records and any forms needing to be filled out. Please arrive 10 minutes early to complete paperwork.              Additional Services     PHYSICAL THERAPY REFERRAL       If you have not heard from the scheduling office within 2 business days, please call [phone] for all locations, with the exception of [place], please call [phone] and [place], please call [phone].    Please be aware that coverage of these services is subject to the terms and limitations of your health insurance plan.  Call member services at your health plan with any benefit or coverage questions.                  Pending Results     No orders found for last 3 day(s).            Statement of Approval     Ordered          10/28/18 0911  I have reviewed and agree with all the recommendations and orders detailed in this document.  EFFECTIVE NOW     Approved and electronically signed by:  Leanna Zafar MD             Admission Information     Date & Time Provider Department Dept. Phone    10/27/2018 Leanna Zafar MD [address] [phone]      Your Vitals Were     Blood Pressure Pulse Temperature Respirations Height Weight    126/69 (BP Location: Left arm) 73 97.8  F (36.6  C) (Oral) 18 1.638 m (5' 4.5\") 60.8 kg (134 lb 1.6 oz)    Pulse Oximetry BMI (Body Mass Index)                95% 22.66 kg/m2          MyChart Information     [facility] [address] gives you secure access to your electronic health record. If you see a primary care provider, you can also send messages to your care team and make appointments. If you have questions, please call your primary care clinic.  If you do not have a primary care provider, please call [phone] and they will assist you.        Care EveryWhere ID     This is your Care EveryWhere ID. This could be used by other organizations to access your [facility] medical records  [id number]      "   Equal Access to Services     [facility]: Hadii aad ku hadrandysam Sobutchali, waaxda luqadaha, qaybta kaalmada gwynbenhalina, alice mcdonaldlandyjo ann garza. So [facility] [phone].    ATENCIÓN: Si habla español, tiene a lin disposición servicios gratuitos de asistencia lingüística. [place] al [phone].    We comply with applicable federal civil rights laws and [place] laws. We do not discriminate on the basis of race, color, national origin, age, disability, sex, sexual orientation, or gender identity.               Review of your medicines      START taking        Dose / Directions    meclizine 25 MG tablet   Commonly known as:  ANTIVERT        Dose:  25 mg   Take 1 tablet (25 mg) by mouth 3 times daily as needed for dizziness   Quantity:  30 tablet   Refills:  0         CONTINUE these medicines which have NOT CHANGED        Dose / Directions    fluticasone 50 MCG/ACT spray   Commonly known as:  FLONASE   Used for:  Allergic rhinitis, cause unspecified        USE TWO SPRAYS IN EACH NOSTRIL DAILY   Quantity:  1 Package   Refills:  prn       lisinopril 10 MG tablet   Commonly known as:  PRINIVIL/ZESTRIL   Used for:  Benign essential hypertension        TAKE 1 TABLET (10 MG) BY MOUTH DAILY   Quantity:  30 tablet   Refills:  0       omega-3 fatty acids 1200 MG capsule        Take four tablets daily   Refills:  0            Where to get your medicines      These medications were sent to [facility] [place] [id number] [facility]   [address] , [facility] [address]     Phone:  [phone]     meclizine 25 MG tablet                Protect others around you: Learn how to safely use, store and throw away your medicines at www.disposemymeds.org.             Medication List: This is a list of all your medications and when to take them. Check marks below indicate your daily home schedule. Keep this list as a reference.      Medications           Morning Afternoon Evening Bedtime As Needed     fluticasone 50 MCG/ACT spray   Commonly known as:  FLONASE   USE TWO SPRAYS IN EACH NOSTRIL DAILY                                   lisinopril 10 MG tablet   Commonly known as:  PRINIVIL/ZESTRIL   TAKE 1 TABLET (10 MG) BY MOUTH DAILY   Last time this was given:  10 mg on 10/28/2018  9:05 AM                                   meclizine 25 MG tablet   Commonly known as:  ANTIVERT   Take 1 tablet (25 mg) by mouth 3 times daily as needed for dizziness   Last time this was given:  25 mg on 10/28/2018  9:05 AM                                   omega-3 fatty acids 1200 MG capsule   Take four tablets daily                                             More Information        Meclizine tablets or capsules  Brand Names: Antivert, Dramamine Less Drowsy, Meni-D  What is this medicine?  MECLIZINE (RADHA ramey) is an antihistamine. It is used to prevent nausea, vomiting, or dizziness caused by motion sickness. It is also used to prevent and treat vertigo (extreme dizziness or a feeling that you or your surroundings are tilting or spinning around).  How should I use this medicine?  Take this medicine by mouth with a glass of water. Follow the directions on the prescription label. If you are using this medicine to prevent motion sickness, take the dose at least 1 hour before travel. If it upsets your stomach, take it with food or milk. Take your doses at regular intervals. Do not take your medicine more often than directed.  Talk to your pediatrician regarding the use of this medicine in children. Special care may be needed.  What side effects may I notice from receiving this medicine?  Side effects that you should report to your doctor or health care professional as soon as possible:    feeling faint or lightheaded, falls    fast, irregular heartbeat  Side effects that usually do not require medical attention (report to your doctor or health care professional if they continue or are bothersome):    constipation    headache    trouble  passing urine or change in the amount of urine    trouble sleeping    upset stomach  What may interact with this medicine?  Do not take this medicine with any of the following medications:    MAOIs like Carbex, Eldepryl, Marplan, Nardil, and Parnate  This medicine may also interact with the following medications:    alcohol    antihistamines for allergy, cough and cold    certain medicines for anxiety or sleep    certain medicines for depression, like amitriptyline, fluoxetine, sertraline    certain medicines for seizures like phenobarbital, primidone    general anesthetics like halothane, isoflurane, methoxyflurane, propofol    local anesthetics like lidocaine, pramoxine, tetracaine    medicines that relax muscles for surgery    narcotic medicines for pain    phenothiazines like chlorpromazine, mesoridazine, prochlorperazine, thioridazine  What if I miss a dose?  If you miss a dose, take it as soon as you can. If it is almost time for your next dose, take only that dose. Do not take double or extra doses.  Where should I keep my medicine?  Keep out of the reach of children.  Store at room temperature between 15 and 30 degrees C (59 and 86 degrees F). Keep container tightly closed. Throw away any unused medicine after the expiration date.  What should I tell my health care provider before I take this medicine?  They need to know if you have any of these conditions:    glaucoma    lung or breathing disease, like asthma    problems urinating    prostate disease    stomach or intestine problems    an unusual or allergic reaction to meclizine, other medicines, foods, dyes, or preservatives    pregnant or trying to get pregnant    breast-feeding  What should I watch for while using this medicine?  Tell your doctor or healthcare professional if your symptoms do not start to get better or if they get worse.  You may get drowsy or dizzy. Do not drive, use machinery, or do anything that needs mental alertness until you know  how this medicine affects you. Do not stand or sit up quickly, especially if you are an older patient. This reduces the risk of dizzy or fainting spells. Alcohol may interfere with the effect of this medicine. Avoid alcoholic drinks.  Your mouth may get dry. Chewing sugarless gum or sucking hard candy, and drinking plenty of water may help. Contact your doctor if the problem does not go away or is severe.  This medicine may cause dry eyes and blurred vision. If you wear contact lenses you may feel some discomfort. Lubricating drops may help. See your eye doctor if the problem does not go away or is severe.  NOTE:This sheet is a summary. It may not cover all possible information. If you have questions about this medicine, talk to your doctor, pharmacist, or health care provider. Copyright  2018 [employer]

## 2018-10-27 NOTE — IP AVS SNAPSHOT
[address] [facility]    [address] [place] [address]    Phone:  [phone]                                       After Visit Summary   10/27/2018    Rosanna Ivory    MRN: [id number]           After Visit Summary Signature Page     I have received my discharge instructions, and my questions have been answered. I have discussed any challenges I see with this plan with the nurse or doctor.    ..........................................................................................................................................  Patient/Patient Representative Signature      ..........................................................................................................................................  Patient Representative Print Name and Relationship to Patient    ..................................................               ................................................  Date                                   Time    ..........................................................................................................................................  Reviewed by Signature/Title    ...................................................              ..............................................  Date                                               Time          22EPIC Rev 08/18

## 2018-10-28 VITALS
TEMPERATURE: 97.8 F | SYSTOLIC BLOOD PRESSURE: 126 MMHG | OXYGEN SATURATION: 95 % | HEART RATE: 73 BPM | BODY MASS INDEX: 22.34 KG/M2 | HEIGHT: 65 IN | DIASTOLIC BLOOD PRESSURE: 69 MMHG | WEIGHT: 134.1 LBS | RESPIRATION RATE: 18 BRPM

## 2018-10-28 LAB — GLUCOSE BLDC GLUCOMTR-MCNC: 151 MG/DL (ref 70–99)

## 2018-10-28 PROCEDURE — 99217 ZZC OBSERVATION CARE DISCHARGE: CPT | Performed by: FAMILY MEDICINE

## 2018-10-28 PROCEDURE — 00000146 ZZHCL STATISTIC GLUCOSE BY METER IP

## 2018-10-28 PROCEDURE — 96361 HYDRATE IV INFUSION ADD-ON: CPT

## 2018-10-28 PROCEDURE — 25000132 ZZH RX MED GY IP 250 OP 250 PS 637: Performed by: FAMILY MEDICINE

## 2018-10-28 PROCEDURE — G0378 HOSPITAL OBSERVATION PER HR: HCPCS

## 2018-10-28 PROCEDURE — 25000128 H RX IP 250 OP 636: Performed by: EMERGENCY MEDICINE

## 2018-10-28 RX ORDER — MECLIZINE HYDROCHLORIDE 25 MG/1
25 TABLET ORAL 3 TIMES DAILY PRN
Qty: 30 TABLET | Refills: 0 | Status: ON HOLD | OUTPATIENT
Start: 2018-10-28 | End: 2019-07-23

## 2018-10-28 RX ADMIN — MECLIZINE HCL 25 MG: 12.5 TABLET ORAL at 09:05

## 2018-10-28 RX ADMIN — SODIUM CHLORIDE: 9 INJECTION, SOLUTION INTRAVENOUS at 03:47

## 2018-10-28 RX ADMIN — LISINOPRIL 10 MG: 10 TABLET ORAL at 09:05

## 2018-10-28 NOTE — H&P
[facility]    History and Physical  Hospitalist       Date of Admission:  10/27/2018    Assessment & Plan   Rosanna Ivory is a 58 year old female who presents with onset of dizziness associate with nausea and vomiting last evening.  Progressed overnight, to the point that this morning she could not ambulate safely and was brought to the emergency department.  Workup in the emergency department showing a normal CT scan with normal laboratory workup.  The belief is that she has a benign positional vertigo.  She was treated with antiemetics, meclizine, Ativan and is not improved to the point that she is safe to go home.  She will be registered observation, treat with IV fluids, will continue use of meclizine, Ativan as needed.  If still symptomatic tomorrow, would have physical therapy work with her.  If it is worsening, consider MRI to evaluate for possible central causes.    No problem-specific Assessment & Plan notes found for this encounter.      DVT Prophylaxis: Observation status  Code Status: Full Code  Expected discharge: Today, recommended to prior living arrangement once Resolved dizziness.    Miki Montaño MD    Primary Care Physician   Broderick Siu    Chief Complaint   58-year-old female with onset of dizziness last evening    History is obtained from the patient, electronic health record, emergency department physician and patient's spouse    History of Present Illness   Rosanna Ivory is a 58 year old female who presents with onset of dizziness last evening.  She had spent the day in a loud environment at a bowling alley.  Toward evening was having a muffled sensation in her right ear and by evening was feeling somewhat dizzy.  During the night when she got up to the bathroom she felt very dizzy and needed assistance from her  to get to the bathroom.  She found that if she walked with her eyes closed that she felt better.  She denied any vein in the ears.   Denied any cold symptoms progressed to the point that she became nauseated and had several episodes of emesis.  She denies any previous history of similar symptoms and denies any recent history of head trauma.  Medical history significant for hypertension, taking low-dose lisinopril daily.  She does smoke 1 pack cigarettes a day.    Past Medical History    I have reviewed this patient's medical history and updated it with pertinent information if needed.   Past Medical History:   Diagnosis Date     Alcohol abuse     treatment     Allergic rhinitis, cause unspecified     seasonal     ASCUS on Pap smear 7/30/10    HPV + 52 (high risk)     Esophageal reflux      External hemorrhoids without mention of complication      History of colposcopy with cervical biopsy 8/23/10    bx- cervicitis.  no dysplasia noted     Other chest pain     atypical, normal stress testing     Pure hypercholesterolemia     total 220       Past Surgical History   I have reviewed this patient's surgical history and updated it with pertinent information if needed.  Past Surgical History:   Procedure Laterality Date     C  DELIVERY ONLY      , Low Cervical     COLONOSCOPY  02/01/10    with snare polypectomy     HC CARDIAC STRESS TST,COMPLETE      normal per pt     HC HEMORRHOIDECTOMY,INT/EXT,SIMPLE  02/18/10       Prior to Admission Medications   Prior to Admission Medications   Prescriptions Last Dose Informant Patient Reported? Taking?   OMEGA-3 FATTY ACIDS 1200 MG OR CAPS Unknown at Unknown time  Yes No   Sig: Take four tablets daily   fluticasone (FLONASE) 50 MCG/ACT nasal spray Unknown at Unknown time  No No   Sig: USE TWO SPRAYS IN EACH NOSTRIL DAILY   lisinopril (PRINIVIL/ZESTRIL) 10 MG tablet 10/27/2018 at 1400  No Yes   Sig: TAKE 1 TABLET (10 MG) BY MOUTH DAILY      Facility-Administered Medications: None     Allergies   Allergies   Allergen Reactions     Iodine        Social History   I have reviewed  this patient's social history and updated it with pertinent information if needed. Rosanna Ivory  reports that she has been smoking Cigarettes.  She has a 3.00 pack-year smoking history. She has never used smokeless tobacco. She reports that she drinks about 15.0 oz of alcohol per week  She reports that she does not use illicit drugs.    Family History   I have reviewed this patient's family history and updated it with pertinent information if needed.   Family History   Problem Relation Age of Onset     Diabetes Maternal Grandmother      Hypertension Maternal Grandmother      Hypertension Father      Asthma Father      GASTROINTESTINAL DISEASE Father      hiatal hernia     Diabetes Father      adult     Prostate Cancer Paternal Grandfather      Circulatory Paternal Grandfather      anyurism     Obesity Sister      Lipids Sister      Alzheimer Disease Mother      Breast Cancer Maternal Aunt      after menopause     Breast Cancer Paternal Aunt      after menopause     C.A.D. No family hx of        Review of Systems   The 10 point Review of Systems is negative other than noted in the HPI or here.     Physical Exam   Temp: 97.1  F (36.2  C) Temp src: Oral BP: (!) 161/102 Pulse: 80 Heart Rate: 80 Resp: 12 SpO2: 95 % O2 Device: None (Room air)    Vital Signs with Ranges  Temp:  [97  F (36.1  C)-97.1  F (36.2  C)] 97.1  F (36.2  C)  Pulse:  [80] 80  Heart Rate:  [70-80] 80  Resp:  [12-14] 12  BP: (136-201)/() 161/102  SpO2:  [94 %-99 %] 95 %  134 lbs 1.6 oz    Constitutional: Lying in bed, no apparent distress  Eyes: Pupils equal, reactive, EOM intact.  No signs of nystagmus on lateral gaze  HEENT: Mouth nose and throat are normal.  TMs appear normal  Respiratory: Lungs are clear  Cardiovascular: Regular rate and rhythm, no murmur heard  GI: Abdomen is soft, nontender  Lymph/Hematologic: Cervical nodes are negative  Genitourinary: Not examined  Skin: No lesions or rashes  Musculoskeletal: No abnormalities, no signs  of deformity, moving arms and legs without difficulty  Neurologic: Cranial nerves are normal.  Strength, tone, sensation of extremities are normal.  Did not attempt to walk, although she just been up to the bathroom and seemed to do okay  Psychiatric: Alert, oriented, mood and affect are normal    Data   Data reviewed today:  I personally reviewed the head CT image(s) showing Showing no abnormalities, no lesions, no bleed.    Recent Labs  Lab 10/27/18  0946   WBC 6.3   HGB 14.5   MCV 92         POTASSIUM 4.1   CHLORIDE 100   CO2 24   BUN 6*   CR 0.52   ANIONGAP 11   KAVEH 8.5   GLC 88   ALBUMIN 3.7   PROTTOTAL 8.0   BILITOTAL 0.5   ALKPHOS 128   ALT 24   AST 24       Recent Results (from the past 24 hour(s))   Head CT w/o contrast    Narrative    CT SCAN OF THE HEAD WITHOUT CONTRAST   10/27/2018 11:00 AM     HISTORY: Dizziness.    TECHNIQUE: Axial images of the head and coronal reformations without  IV contrast material.  Radiation dose for this scan was reduced using  automated exposure control, adjustment of the mA and/or kV according  to patient size, or iterative reconstruction technique.    COMPARISON: None.    FINDINGS: The ventricles are normal in size, shape and configuration.   The brain parenchyma and subarachnoid spaces are normal. There is no  evidence of intracranial hemorrhage, mass, acute infarct or anomaly.     The visualized portions of the sinuses and mastoids appear normal.  There is no evidence of trauma.      Impression    IMPRESSION: Normal CT scan of the head.      ASA QURESHI MD

## 2018-10-28 NOTE — PLAN OF CARE
Problem: Patient Care Overview  Goal: Plan of Care/Patient Progress Review  Patient reports feeling much better. She denies dizziness while lying and states that she is able to move her head back and forth without symptoms. Pt feels slightly off balance when ambulating but states this has improved. She has not had any nausea. Vitals stable.

## 2018-10-28 NOTE — DISCHARGE SUMMARY
[facility]    Discharge Summary  Hospitalist    Date of Admission:  10/27/2018  Date of Discharge:  10/28/2018  Discharging Provider: Miki Montaño MD  Date of Service (when I saw the patient): 10/28/18    Discharge Diagnoses   Principal Problem:    Vertigo  Active Problems:    Benign essential hypertension    Cigarette smoker        History of Present Illness   Copied from H&P:  Rosanna Ivory is a 58 year old female who presents with onset of dizziness last evening.  She had spent the day in a [place] environment [place] [employer] [place].  Toward evening was having a muffled sensation in her right ear and by evening was feeling somewhat dizzy.  During the night when she got up to the bathroom she felt very dizzy and needed assistance from her  to get to the bathroom.  She found that if she walked with her eyes closed that she felt better.  She denied any vein in the ears.  Denied any cold symptoms progressed to the point that she became nauseated and had several episodes of emesis.  She denies any previous history of similar symptoms and denies any recent history of head trauma.  Medical history significant for hypertension, taking low-dose lisinopril daily.  She does smoke 1 pack cigarettes a day.       Hospital Course   Rosanna Ivory was admitted on 10/27/2018.  The following problems were addressed during her hospitalization:    Principal Problem:    Vertigo  Active Problems:    Benign essential hypertension    Cigarette smoker    Presented to the emergency department with persistent vertigo.  Presumed benign positional vertigo with normal CT of the head and normal lab work.  In the emergency room she did not improve fully and was registered observation for symptomatic care, IV fluids.  Overnight she improved quite a bit and by morning was feeling only slightly dizzy but able to ambulate safely to the bathroom did not have any more nausea or vomiting.  Patient will be  discharged to home after breakfast, assuming that she is continuing to do well.  Will send home with some meclizine for symptoms.  Will follow-up if not improving.    Miki Montaño MD    Significant Results and Procedures   Normal head CT    Pending Results   These results will be followed up by Bebeto Siu  Unresulted Labs Ordered in the Past 30 Days of this Admission     No orders found for last 61 day(s).          Code Status   Full Code       Primary Care Physician   Broderick Siu    Physical Exam   Temp: 97.1  F (36.2  C) Temp src: Oral BP: 130/86 Pulse: 78 Heart Rate: 78 Resp: 18 SpO2: 96 % O2 Device: None (Room air)    Vitals:    10/27/18 1750   Weight: 60.8 kg (134 lb 1.6 oz)     Vital Signs with Ranges  Temp:  [96.2  F (35.7  C)-97.6  F (36.4  C)] 97.1  F (36.2  C)  Pulse:  [77-80] 78  Heart Rate:  [70-80] 78  Resp:  [12-18] 18  BP: (130-201)/() 130/86  SpO2:  [94 %-99 %] 96 %       Constitutional: awake, alert, cooperative, no apparent distress, and appears stated age  Eyes: Lids and lashes normal, pupils equal, round and reactive to light, extra ocular muscles intact, sclera clear, conjunctiva normal  Respiratory: No increased work of breathing, good air exchange, clear to auscultation bilaterally, no crackles or wheezing  Cardiovascular: Normal apical impulse, regular rate and rhythm, normal S1 and S2, no S3 or S4, and no murmur noted  Neurologic: Awake, alert, oriented to name, place and time.  Cranial nerves II-XII are grossly intact.  Motor is 5 out of 5 bilaterally.  Cerebellar finger to nose, heel to shin intact.  Sensory is intact.  Babinski down going, Romberg negative, and gait is normal.    Discharge Disposition   Discharged to home  Condition at discharge: Good    Consultations This Hospital Stay   None    Time Spent on this Encounter   I, Miki Montaño, personally saw the patient today and spent less than or equal to 30 minutes discharging this patient.    Discharge  Orders     Follow-up and recommended labs and tests    Follow up with primary care provider, Broderick Siu, within 7 days for hospital follow- up.  No follow up labs or test are needed.     Activity   Your activity upon discharge: activity as tolerated     Full Code     Diet   Follow this diet upon discharge: Orders Placed This Encounter     Room Service     Advance Diet as Tolerated: Regular Diet Adult       Discharge Medications   Current Discharge Medication List      START taking these medications    Details   meclizine (ANTIVERT) 25 MG tablet Take 1 tablet (25 mg) by mouth 3 times daily as needed for dizziness  Qty: 30 tablet, Refills: 0    Associated Diagnoses: Vertigo         CONTINUE these medications which have NOT CHANGED    Details   lisinopril (PRINIVIL/ZESTRIL) 10 MG tablet TAKE 1 TABLET (10 MG) BY MOUTH DAILY  Qty: 30 tablet, Refills: 0    Comments: Medication is being filled for 1 time refill only due to:  Patient needs to be seen because due for physical.  Associated Diagnoses: Benign essential hypertension      fluticasone (FLONASE) 50 MCG/ACT nasal spray USE TWO SPRAYS IN EACH NOSTRIL DAILY  Qty: 1 Package, Refills: prn    Associated Diagnoses: Allergic rhinitis, cause unspecified      OMEGA-3 FATTY ACIDS 1200 MG OR CAPS Take four tablets daily  Refills: 0    Comments: Take a total of 4800mg daily (1480mg in form of EPA/DHA)           Allergies   Allergies   Allergen Reactions     Iodine      Data   Most Recent 3 CBC's:  Recent Labs   Lab Test  10/27/18   0946  07/29/13   2028  05/16/13   1730   WBC  6.3  7.5  7.7   HGB  14.5  13.6  13.1   MCV  92  91  89   PLT  291  303  298      Most Recent 3 BMP's:  Recent Labs   Lab Test  10/27/18   0946  06/20/18   1556  06/13/18   0934   NA  135  135  130*   POTASSIUM  4.1  4.0  4.3   CHLORIDE  100  93*  94   CO2  24  31  28   BUN  6*  10  6*   CR  0.52  0.80  0.58   ANIONGAP  11  11  8   KAVEH  8.5  8.8  8.8   GLC  88  102*  77     Most Recent 2  LFT's:  Recent Labs   Lab Test  10/27/18   0946  06/13/18   0934   AST  24  31   ALT  24  25   ALKPHOS  128  122   BILITOTAL  0.5  0.9     Most Recent INR's and Anticoagulation Dosing History:  Anticoagulation Dose History     Recent Dosing and Labs Latest Ref Rng & Units 5/16/2013    INR 0.86 - 1.14 0.90        Most Recent 3 Troponin's:  Recent Labs   Lab Test  07/29/13 2028  02/06/11   1100   TROPI  <0.012  <0.012     Most Recent Cholesterol Panel:  Recent Labs   Lab Test  06/13/18   0933   CHOL  242*   LDL  118*   HDL  106   TRIG  91     Most Recent 6 Bacteria Isolates From Any Culture (See EPIC Reports for Culture Details):  Recent Labs   Lab Test  12/12/17   1555   CULT  No beta hemolytic Streptococcus Group A isolated     Most Recent TSH, T4 and A1c Labs:No lab results found.  Results for orders placed or performed during the hospital encounter of 10/27/18   Head CT w/o contrast    Narrative    CT SCAN OF THE HEAD WITHOUT CONTRAST   10/27/2018 11:00 AM     HISTORY: Dizziness.    TECHNIQUE: Axial images of the head and coronal reformations without  IV contrast material.  Radiation dose for this scan was reduced using  automated exposure control, adjustment of the mA and/or kV according  to patient size, or iterative reconstruction technique.    COMPARISON: None.    FINDINGS: The ventricles are normal in size, shape and configuration.   The brain parenchyma and subarachnoid spaces are normal. There is no  evidence of intracranial hemorrhage, mass, acute infarct or anomaly.     The visualized portions of the sinuses and mastoids appear normal.  There is no evidence of trauma.      Impression    IMPRESSION: Normal CT scan of the head.      ASA QURESHI MD

## 2018-10-28 NOTE — PROGRESS NOTES
S-(situation): Patient discharged to home via W/C with spouse.    B-(background): Pt to hospital for vertigo and N/V.    A-(assessment): Pt denies vertigo or nausea. VSS.     R-(recommendations): Discharge instructions reviewed with patient and pt's spouse. Listed belongings gathered and returned to patient.      Discharge Nursing Criteria:     Care Plan and Patient education resolved: Yes    New Medications- pt has been educated about purpose and side effects: Yes    Vaccines  Influenza status verified at discharge:  Yes    MISC  Prescriptions if needed, hard copies sent with patient: Prescription sent to [facility] and pt's spouse picked up prescription.  Home and hospital aquired medications returned to patient: NA  Medication Bin checked and emptied on discharge Yes  Patient reports post-discharge pain management plan is effective: Yes

## 2018-10-29 ENCOUNTER — TELEPHONE (OUTPATIENT)
Dept: FAMILY MEDICINE | Facility: OTHER | Age: 58
End: 2018-10-29

## 2018-10-29 NOTE — TELEPHONE ENCOUNTER
RN to call for hospital follow up:    Reason for follow up: Rosanna Ivory appeared on our list for being seen in an Emergency Room or a recent Hospital discharge.    Admitting date: 10/27/18  Discharge date: 10/28/18  Location:   Reason for visit:   Chief Complaint   Patient presents with     Hospital F/U     Dizziness     Pia Collado RN, BSN

## 2018-10-29 NOTE — PROGRESS NOTES
"  SUBJECTIVE:   Rosanna Ivory is a 58 year old female who presents to clinic today for the following health issues:    HPI      Hospital Follow-up Visit:    Hospital/Nursing Home/IP Rehab Facility: [facility]  Date of Admission: 10/27/2018  Date of Discharge: 10/28/2018  Reason(s) for Admission: vertigo            Problems taking medications regularly:  None       Medication changes since discharge: None       Problems adhering to non-medication therapy:  None    Summary of hospitalization:  [facility] discharge summary reviewed  \"Rosanna Ivory is a 58 year old female who presents with onset of dizziness last evening.  She had spent the day in a loud environment [facility] [employer] [facility]  Toward evening was having a muffled sensation in her right ear and by evening was feeling somewhat dizzy.  During the night when she got up to the bathroom she felt very dizzy and needed assistance from her  to get to the bathroom.  She found that if she walked with her eyes closed that she felt better.  She denied any vein in the ears.  Denied any cold symptoms progressed to the point that she became nauseated and had several episodes of emesis.  She denies any previous history of similar symptoms and denies any recent history of head trauma.  Medical history significant for hypertension, taking low-dose lisinopril daily.  She does smoke 1 pack cigarettes a day.  Presented to the emergency department with persistent vertigo.  Presumed benign positional vertigo with normal CT of the head and normal lab work.  In the emergency room she did not improve fully and was registered observation for symptomatic care, IV fluids.  Overnight she improved quite a bit and by morning was feeling only slightly dizzy but able to ambulate safely to the bathroom did not have any more nausea or vomiting.  Patient will be discharged to home after breakfast, assuming that she is continuing to do well.  Will send home with some " "meclizine for symptoms.  Will follow-up if not improving.\"  Diagnostic Tests/Treatments reviewed.  Follow up needed: none  Other Healthcare Providers Involved in Patient s Care:         vestibular therapy  Update since discharge: improved. She is feeling much better since her brief hospital day with significant improvement in her vertigo and resolution of nausea and vomiting. She still feels \"slightly foggy\" but went to vestibular PT this morning and already feels a positive difference. She states the meclizine was not helpful so she stopped this.     Post Discharge Medication Reconciliation: discharge medications reconciled, continue medications without change.  Plan of care communicated with patient     Coding guidelines for this visit:  Type of Medical   Decision Making Face-to-Face Visit       within 7 Days of discharge Face-to-Face Visit        within 14 days of discharge   Moderate Complexity [id number]   High Complexity [id number]            Problem list and histories reviewed & adjusted, as indicated.  Additional history: none    ROS:  GENERAL: Denies fever, fatigue, weakness, weight gain, or weight loss.   CARDIOVASCULAR: Denies chest pain, shortness of breath, irregular heartbeats,  palpitations, or edema.  RESPIRATORY: Denies cough, hemoptysis, and shortness of breath.  NEUROLOGIC: +Improved vertigo. Denies headache, fainting, memory loss, numbness, tingling, or seizures.  PSYCHIATRIC: Denies depression, anxiety, mood swings, and thoughts of suicide.    OBJECTIVE:     /84  Pulse 69  Temp 97.2  F (36.2  C) (Temporal)  Resp 16  Wt 135 lb (61.2 kg)  SpO2 100%  BMI 22.81 kg/m2  Body mass index is 22.81 kg/(m^2).  GENERAL: healthy, alert and no distress  EYES: Eyes grossly normal to inspection, PERRL and conjunctivae and sclerae normal  RESP: lungs clear to auscultation - no rales, rhonchi or wheezes  CV: regular rate and rhythm, normal S1 S2, no S3 or S4, no murmur, click or rub, no peripheral " edema and peripheral pulses strong  NEURO: Normal strength and tone, mentation intact and speech normal. Cranial nerves II-XII are grossly intact. No nystagmus. Gait is stable.     ASSESSMENT/PLAN:       ICD-10-CM    1. Benign paroxysmal positional vertigo, unspecified laterality H81.10    2. Benign essential hypertension I10 lisinopril (PRINIVIL/ZESTRIL) 10 MG tablet       Significant improvement in her BPPV symptoms and she is currently undergoing vestibular PT with positive results so far.  I recommend she drink plenty of fluids and if symptoms drastically worsen, she will contact the clinic.    Lisinopril refilled. BP stable.     She is going to schedule her mammogram and will come back in for a flu shot and Tdap.    Follow up in June for an annual exam.    Broderick Siu PA-C  [facility]

## 2018-10-29 NOTE — TELEPHONE ENCOUNTER
"Hospital/TCU/ED for chronic condition Discharge Protocol  \"Hi, my name is Pia, a registered nurse, and I am calling from [facility].  I am calling to follow up and see how things are going for you after your recent emergency visit/hospital/TCU stay.\"  Tell me how you are doing now that you are home?\" Patient is doing well - she has slight dizziness still, but is going to set up with PT.  Discharge Instructions  What is/are the follow-up recommendations? Pt. Response: Follow up Thursday and with PT  \"Has an appointment with your primary care provider been scheduled?\"  Yes. (confirm)  Medications  \"Tell me what changed about your medicines when you discharged?\" none  \"What questions do you have about your medications?\" None   New diagnoses of heart failure, COPD, diabetes, or MI? No    Medication reconciliation completed? Yes  Was MTM referral placed (*Make sure to put transitions as reason for referral)? No  Call Summary  \"What questions or concerns do you have about your recent visit and your follow-up care?\"   none       Pia Collado, RN, BSN      "

## 2018-11-01 ENCOUNTER — THERAPY VISIT (OUTPATIENT)
Dept: PHYSICAL THERAPY | Facility: CLINIC | Age: 58
End: 2018-11-01
Payer: COMMERCIAL

## 2018-11-01 ENCOUNTER — OFFICE VISIT (OUTPATIENT)
Dept: FAMILY MEDICINE | Facility: OTHER | Age: 58
End: 2018-11-01
Payer: COMMERCIAL

## 2018-11-01 VITALS
DIASTOLIC BLOOD PRESSURE: 84 MMHG | RESPIRATION RATE: 16 BRPM | BODY MASS INDEX: 22.81 KG/M2 | TEMPERATURE: 97.2 F | OXYGEN SATURATION: 100 % | SYSTOLIC BLOOD PRESSURE: 126 MMHG | WEIGHT: 135 LBS | HEART RATE: 69 BPM

## 2018-11-01 DIAGNOSIS — R42 DIZZINESS: ICD-10-CM

## 2018-11-01 DIAGNOSIS — H81.10 BENIGN PAROXYSMAL POSITIONAL VERTIGO, UNSPECIFIED LATERALITY: Primary | ICD-10-CM

## 2018-11-01 DIAGNOSIS — I10 BENIGN ESSENTIAL HYPERTENSION: ICD-10-CM

## 2018-11-01 DIAGNOSIS — H81.11 BENIGN PAROXYSMAL POSITIONAL VERTIGO, RIGHT: Primary | ICD-10-CM

## 2018-11-01 PROCEDURE — 99213 OFFICE O/P EST LOW 20 MIN: CPT | Performed by: PHYSICIAN ASSISTANT

## 2018-11-01 PROCEDURE — 97161 PT EVAL LOW COMPLEX 20 MIN: CPT | Mod: GP | Performed by: PHYSICAL THERAPIST

## 2018-11-01 PROCEDURE — 95992 CANALITH REPOSITIONING PROC: CPT | Mod: GP | Performed by: PHYSICAL THERAPIST

## 2018-11-01 RX ORDER — LISINOPRIL 10 MG/1
TABLET ORAL
Qty: 90 TABLET | Refills: 3 | Status: SHIPPED | OUTPATIENT
Start: 2018-11-01 | End: 2019-11-23

## 2018-11-01 NOTE — MR AVS SNAPSHOT
After Visit Summary   11/1/2018    Rosanna Ivory    MRN: [id number]           Patient Information     Date Of Birth          1960        Visit Information        Provider Department      11/1/2018 9:40 AM Norm Earl PT [facility] Physical [facility]        Today's Diagnoses     Benign paroxysmal positional vertigo, right    -  1    Dizziness           Follow-ups after your visit        Your next 10 appointments already scheduled     Nov 01, 2018 10:30 AM CDT   Office Visit with Broderick Siu PA-C   [facility]    [address] [place] [id number]   [phone]           Bring a current list of meds and any records pertaining to this visit. For Physicals, please bring immunization records and any forms needing to be filled out. Please arrive 10 minutes early to complete paperwork.              Who to contact     If you have questions or need follow up information about today's clinic visit or your schedule please contact [facility] PH[facility] directly at [phone].  Normal or non-critical lab and imaging results will be communicated to you by [employer], letter or phone within 4 business days after the clinic has received the results. If you do not hear from us within 7 days, please contact the clinic through uberlifet or phone. If you have a critical or abnormal lab result, we will notify you by phone as soon as possible.  Submit refill requests through [employer] or call your pharmacy and they will forward the refill request to us. Please allow 3 business days for your refill to be completed.          Additional Information About Your Visit        Enbasehart Information     [employer] gives you secure access to your electronic health record. If you see a primary care provider, you can also send messages to your care team and make appointments. If you have questions,  please call your primary care clinic.  If you do not have a primary care provider, please call [phone] and they will assist you.        Care EveryWhere ID     This is your Care EveryWhere ID. This could be used by other organizations to access your [place] medical records  [id number]         Blood Pressure from Last 3 Encounters:   10/28/18 126/69   10/12/18 130/88   06/13/18 120/78    Weight from Last 3 Encounters:   10/27/18 60.8 kg (134 lb 1.6 oz)   10/12/18 61.2 kg (135 lb)   06/13/18 59.4 kg (131 lb)              We Performed the Following     CANALITH REPOSITIONING, PER DAY     HC PT EVAL, LOW COMPLEXITY     NBA INITIAL EVAL REPORT     PHYSICAL THERAPY REFERRAL        Primary Care Provider Office Phone # Fax #    Broderick Siu PA-C [phone]       [address]  [facility] [address]        Equal Access to Services     ROMAN HANNAH : Hadii bon ku hadasho Soomaali, waaxda luqadaha, qaybta kaalmada adeegyada, waxay shana fiore . So [facility] [phone].    ATENCIÓN: Si felton marcus, tiene a lin disposición servicios gratuitos de asistencia lingüística. Llame al [phone].    We comply with applicable federal civil rights laws and [place] laws. We do not discriminate on the basis of race, color, national origin, age, disability, sex, sexual orientation, or gender identity.            Thank you!     Thank you for choosing [facility]  for your care. Our goal is always to provide you with excellent care. Hearing back from our patients is one way we can continue to improve our services. Please take a few minutes to complete the written survey that you may receive in the mail after your visit with us. Thank you!             Your Updated Medication List - Protect others around you: Learn how to safely use, store and throw away your medicines at www.disposemymeds.org.          This list is accurate as of  11/1/18 10:18 AM.  Always use your most recent med list.                   Brand Name Dispense Instructions for use Diagnosis    fluticasone 50 MCG/ACT spray    FLONASE    1 Package    USE TWO SPRAYS IN EACH NOSTRIL DAILY    Allergic rhinitis, cause unspecified       lisinopril 10 MG tablet    PRINIVIL/ZESTRIL    30 tablet    TAKE 1 TABLET (10 MG) BY MOUTH DAILY    Benign essential hypertension       meclizine 25 MG tablet    ANTIVERT    30 tablet    Take 1 tablet (25 mg) by mouth 3 times daily as needed for dizziness    Vertigo       omega-3 fatty acids 1200 MG capsule      Take four tablets daily

## 2018-11-01 NOTE — LETTER
[facility] PHYSICAL [facility]  [address]  [facility] [address] [phone]    2018    Re: Rosanna Ivory   :   1960  MRN:  [id number]   REFERRING PHYSICIAN:   Leanna Mcclain*    [facility]    Date of Initial Evaluation:  ***  Visits:  Rxs Used: 1  Reason for Referral:     Dizziness  Benign paroxysmal positional vertigo, right    EVALUATION SUMMARY    [facility] Initial Evaluation  Subjective:  Patient is a 58 year old female presenting with rehab general hpi. The history is provided by the patient. No  was used.   General   Condition requiring PT:  Poor balance (dizziness )  Chronicity:  New  Onset date of current episode/exacerbation comment:  Pt reports she woke up on Saturday morning, room spinning, went to the emergency department. States she was told she had vertigo. The dizziness is improved, but she continues to feel she is in a fog. States she was initially placed on meclizine, only used it for a short period of time. Pt with referral from physician dated 10/27/18 for evaluate and treat vertigo, dizziness, BPPV. She states the dizziness gradually went away throughout the day on Saturday, but remains with unsteady feeling   Site of Pain: n/s no pain.  Radiates to: n/a no pain   Quality: dizziness, vertigo.  Episode frequency: Unsteady feeling constant, improving,   Pain Scale: n/a no pain   Associated symptoms:  Dizziness and loss of balance  Worse during: n/a,no pain   Exacerbated by: Has not noticed dizziness since    Symptoms relieved by:  Nothing  Progression since onset:  Gradually improving  Special tests for this problem:  CT scan  Previous treatment:  Chiropractic  Improvement with previous treatment:  Mild  General health as reported by patient:  Good  Please check all that apply to your current or past medical history:   High blood pressure, smoking and menopausal  Medical allergies:  Yes (see EPIC)  Other surgeries:  Other ( )  Medications you are currently taking:  High blood pressure medication  Occupation comment:     If employed, are you:  Working in normal job without restrictions  What are your primary job tasks:  Prolonged sitting  Barriers at home/work:  Nothing  Red flags:  Severe dizziness                      Objective:  System    Physical Exam        General Evaluation:  AROM:  normal              Gross Strength:  normal                              Balance:  Balance wnl general: Oculomoter exam demonstrating full ROM, VOR and VORc intact , (-) sacchaeds, (-) rapid head thrust. Halpike asael testing (+) for RPCC     Assessment/Plan:    Patient is a 58 year old female with Vertigo dizziness  complaints.    Patient has the following significant findings with corresponding treatment plan.                Diagnosis 1:  BPPV, vertigo   Impaired balance - neuro re-education, therapeutic activities, home program and CRT      Therapy Evaluation Codes:   1) History comprised of:   Personal factors that impact the plan of care:      None.    Comorbidity factors that impact the plan of care are:      Dizziness and High blood pressure.     Medications impacting care: High blood pressure.  2) Examination of Body Systems comprised of:   Body structures and functions that impact the plan of care:      Vestibular system.   Activity limitations that impact the plan of care are:      Bending, Laying down and turning head, rolling over .  3) Clinical presentation characteristics are:   Stable/Uncomplicated.  4) Decision-Making    Low complexity using standardized patient assessment instrument and/or measureable assessment of functional outcome.  Cumulative Therapy Evaluation is: Low complexity.    Previous and current functional limitations:  (See Goal Flow Sheet for this information)    Short term and Long  term goals: (See Goal Flow Sheet for this information)     Communication ability:  Patient appears to be able to clearly communicate and understand verbal and written communication and follow directions correctly.  Treatment Explanation - The following has been discussed with the patient:   RX ordered/plan of care  Anticipated outcomes  Possible risks and side effects  This patient would benefit from PT intervention to resume normal activities.   Rehab potential is good.    Frequency:  1 X week, once daily  Duration:  for 8 weeks  Discharge Plan:  Achieve all LTG.  Independent in home treatment program.  Reach maximal therapeutic benefit.      Thank you for your referral.    INQUIRIES  Therapist:   [facility]  [address] [place], [place] [address]  [facility] [address]  Phone: [phone]  Fax: [phone]

## 2018-11-01 NOTE — MR AVS SNAPSHOT
After Visit Summary   11/1/2018    Rosanna Ivory    MRN: [id number]           Patient Information     Date Of Birth          1960        Visit Information        Provider Department      11/1/2018 10:30 AM Broderick Siu PA-C [facility]        Today's Diagnoses     Benign paroxysmal positional vertigo, unspecified laterality    -  1    Benign essential hypertension          Care Instructions    Continue with physical therapy.  Follow up in June for an annual physical.              Follow-ups after your visit        Follow-up notes from your care team     Return in about 7 months (around 6/1/2019).      Your next 10 appointments already scheduled     Nov 05, 2018  1:50 PM CST   [facility] Spine with Norm Earl, PT   [facility] ([facility]  )    [address]  [facility] [address] [phone]              Who to contact     If you have questions or need follow up information about today's clinic visit or your schedule please contact [facility] directly at [phone].  Normal or non-critical lab and imaging results will be communicated to you by ThingWorxhart, letter or phone within 4 business days after the clinic has received the results. If you do not hear from us within 7 days, please contact the clinic through ThingWorxhart or phone. If you have a critical or abnormal lab result, we will notify you by phone as soon as possible.  Submit refill requests through Casper or call your pharmacy and they will forward the refill request to us. Please allow 3 business days for your refill to be completed.          Additional Information About Your Visit        MyChart Information     Casper gives you secure access to your electronic health record. If you see a primary care provider, you can also send messages to your care team and make appointments. If you have questions, please call your primary care clinic.   If you do not have a primary care provider, please call [phone] and they will assist you.        Care EveryWhere ID     This is your Care EveryWhere ID. This could be used by other organizations to access your [facility] medical records  [id number]        Your Vitals Were     Pulse Temperature Respirations Pulse Oximetry BMI (Body Mass Index)       69 97.2  F (36.2  C) (Temporal) 16 100% 22.81 kg/m2        Blood Pressure from Last 3 Encounters:   11/01/18 126/84   10/28/18 126/69   10/12/18 130/88    Weight from Last 3 Encounters:   11/01/18 135 lb (61.2 kg)   10/27/18 134 lb 1.6 oz (60.8 kg)   10/12/18 135 lb (61.2 kg)              Today, you had the following     No orders found for display         Where to get your medicines      These medications were sent to [place] [address] [facility] - [place], [place] - [address] [address]  [address] [facility] [address]     Phone:  [phone]     lisinopril 10 MG tablet          Primary Care Provider Office Phone # Fax #    Broderick Siu PA-C [phone]       [address]  [facility] [address]        Equal Access to Services     ROMAN HANNAH : Hadii bon dai hadasho Soomaali, waaxda luqadaha, qaybta kaalmada adeegyada, alice garza. So [facility] [phone].    ATENCIÓN: Si habla español, tiene a lin disposición servicios gratuitos de asistencia lingüística. dalton al [phone].    We comply with applicable federal civil rights laws and [place] laws. We do not discriminate on the basis of race, color, national origin, age, disability, sex, sexual orientation, or gender identity.            Thank you!     Thank you for choosing [facility]  for your care. Our goal is always to provide you with excellent care. Hearing back from our patients is one way we can continue to improve our services. Please take a few minutes to complete the written survey that you may receive in the mail after your visit with us. Thank  you!             Your Updated Medication List - Protect others around you: Learn how to safely use, store and throw away your medicines at www.disposemymeds.org.          This list is accurate as of 11/1/18 10:47 AM.  Always use your most recent med list.                   Brand Name Dispense Instructions for use Diagnosis    fluticasone 50 MCG/ACT spray    FLONASE    1 Package    USE TWO SPRAYS IN EACH NOSTRIL DAILY    Allergic rhinitis, cause unspecified       lisinopril 10 MG tablet    PRINIVIL/ZESTRIL    90 tablet    TAKE 1 TABLET (10 MG) BY MOUTH DAILY    Benign essential hypertension       meclizine 25 MG tablet    ANTIVERT    30 tablet    Take 1 tablet (25 mg) by mouth 3 times daily as needed for dizziness    Vertigo       omega-3 fatty acids 1200 MG capsule      Take four tablets daily

## 2018-11-01 NOTE — NURSING NOTE
"Chief Complaint   Patient presents with     Hospital F/U     Panel Management     tdap, advance, flu, mammo       Initial /84  Pulse 69  Temp 97.2  F (36.2  C) (Temporal)  Resp 16  Wt 135 lb (61.2 kg)  SpO2 100%  BMI 22.81 kg/m2 Estimated body mass index is 22.81 kg/(m^2) as calculated from the following:    Height as of 10/27/18: 5' 4.5\" (1.638 m).    Weight as of this encounter: 135 lb (61.2 kg).  Medication Reconciliation: complete    Nany Alicea, PAULINA      "

## 2018-11-01 NOTE — PROGRESS NOTES
[facility] Initial Evaluation  Subjective:  Patient is a 58 year old female presenting with rehab general hpi. The history is provided by the patient. No  was used.   General   Condition requiring PT:  Poor balance (dizziness )  Chronicity:  New  Onset date of current episode/exacerbation comment:  Pt reports she woke up on Saturday morning, room spinning, went to the emergency department. States she was told she had vertigo. The dizziness is improved, but she continues to feel she is in a fog. States she was initially placed on meclizine, only used it for a short period of time. Pt with referral from physician dated 10/27/18 for evaluate and treat vertigo, dizziness, BPPV. She states the dizziness gradually went away throughout the day on Saturday, but remains with unsteady feeling   Site of Pain: n/s no pain.  Radiates to: n/a no pain   Quality: dizziness, vertigo.  Episode frequency: Unsteady feeling constant, improving,   Pain Scale: n/a no pain   Associated symptoms:  Dizziness and loss of balance  Worse during: n/a,no pain   Exacerbated by: Has not noticed dizziness since    Symptoms relieved by:  Nothing  Progression since onset:  Gradually improving  Special tests for this problem:  CT scan  Previous treatment:  Chiropractic  Improvement with previous treatment:  Mild  General health as reported by patient:  Good  Please check all that apply to your current or past medical history:  High blood pressure, smoking and menopausal  Medical allergies:  Yes (see EPIC)  Other surgeries:  Other ( )  Medications you are currently taking:  High blood pressure medication  Occupation comment:  Loam officer assistant   If employed, are you:  Working in normal job without restrictions  What are your primary job tasks:  Prolonged sitting  Barriers at home/work:  Nothing  Red flags:  Severe dizziness                      Objective:  System    Physical Exam         General Evaluation:  AROM:  normal              Gross Strength:  normal                              Balance:  Balance wnl general: Oculomoter exam demonstrating full ROM, VOR and VORc intact , (-) sacchaeds, (-) rapid head thrust. Halpike asael testing (+) for RPCC                                                        ROS    Assessment/Plan:    Patient is a 58 year old female with Vertigo dizziness  complaints.    Patient has the following significant findings with corresponding treatment plan.                Diagnosis 1:  BPPV, vertigo   Impaired balance - neuro re-education, therapeutic activities, home program and CRT      Therapy Evaluation Codes:   1) History comprised of:   Personal factors that impact the plan of care:      None.    Comorbidity factors that impact the plan of care are:      Dizziness and High blood pressure.     Medications impacting care: High blood pressure.  2) Examination of Body Systems comprised of:   Body structures and functions that impact the plan of care:      Vestibular system.   Activity limitations that impact the plan of care are:      Bending, Laying down and turning head, rolling over .  3) Clinical presentation characteristics are:   Stable/Uncomplicated.  4) Decision-Making    Low complexity using standardized patient assessment instrument and/or measureable assessment of functional outcome.  Cumulative Therapy Evaluation is: Low complexity.    Previous and current functional limitations:  (See Goal Flow Sheet for this information)    Short term and Long term goals: (See Goal Flow Sheet for this information)     Communication ability:  Patient appears to be able to clearly communicate and understand verbal and written communication and follow directions correctly.  Treatment Explanation - The following has been discussed with the patient:   RX ordered/plan of care  Anticipated outcomes  Possible risks and side effects  This patient would benefit from PT intervention to  resume normal activities.   Rehab potential is good.    Frequency:  1 X week, once daily  Duration:  for 8 weeks  Discharge Plan:  Achieve all LTG.  Independent in home treatment program.  Reach maximal therapeutic benefit.    Please refer to the daily flowsheet for treatment today, total treatment time and time spent performing 1:1 timed codes.

## 2018-11-05 ENCOUNTER — THERAPY VISIT (OUTPATIENT)
Dept: PHYSICAL THERAPY | Facility: CLINIC | Age: 58
End: 2018-11-05
Payer: COMMERCIAL

## 2018-11-05 DIAGNOSIS — H81.11 BENIGN PAROXYSMAL POSITIONAL VERTIGO, RIGHT: ICD-10-CM

## 2018-11-05 PROCEDURE — 95992 CANALITH REPOSITIONING PROC: CPT | Mod: GP | Performed by: PHYSICAL THERAPIST

## 2018-11-05 NOTE — MR AVS SNAPSHOT
After Visit Summary   11/5/2018    Rosanna Ivory    MRN: [id number]           Patient Information     Date Of Birth          1960        Visit Information        Provider Department      11/5/2018 1:50 PM Norm Earl PT [facility]        Today's Diagnoses     Benign paroxysmal positional vertigo, right           Follow-ups after your visit        Your next 10 appointments already scheduled     Nov 21, 2018  4:00 PM CST   MA SCREENING DIGITAL BILATERAL with ERMA1   [facility]    [address] [facility] [address]   [phone]           How do I prepare for my exam? (Food and drink instructions) No Food and Drink Restrictions.  How do I prepare for my exam? (Other instructions) Do not use any powder, lotion or deodorant under your arms or on your breast. If you do, we will ask you to remove it before your exam.  What should I wear: Wear comfortable, two-piece clothing.  How long does the exam take: Most scans will take 15 minutes.  What should I bring: Bring any previous mammograms from other facilities or have them mailed to the breast center.  Do I need a :  No  is needed.  What do I need to tell my doctor: If you have any allergies, tell your care team.  What should I do after the exam: No restrictions, You may resume normal activities.  What is this test: This test is an x-ray of the breast to look for breast disease. The breast is pressed between two plates to flatten and spread the tissue. An X-ray is taken of the breast from different angles.  Who should I call with questions: If you have any questions, please call the Imaging Department where you will have your exam. Directions, parking instructions, and other information is available on our website, [email or web address].  Other information about my exam Three-dimensional (3D) mammograms are available at [place]  "locations in [place], [place], [place], [place], [facility], [place], [facility] and [place].  Health locations include [place] and the [facility] Center in [place].  Benefits of 3D mammograms include * Improved rate of cancer detection * Decreases your chance of having to go back for more tests, which means fewer: * \"False-positive\" results (This means that there is an abnormal area but it isn't cancer.) * Invasive testing procedures, such as a biopsy or surgery * Can provide clearer images of the breast if you have dense breast tissue.  *3D mammography is an optional exam that anyone can have with a 2D mammogram. It doesn't replace or take the place of a 2D mammogram. 2D mammograms remain an effective screening test for all women.  Not all insurance companies cover the cost of a 3D mammogram. Check with your insurance.              Who to contact     If you have questions or need follow up information about today's clinic visit or your schedule please contact [facility] directly at [phone].  Normal or non-critical lab and imaging results will be communicated to you by SchoolTubehart, letter or phone within 4 business days after the clinic has received the results. If you do not hear from us within 7 days, please contact the clinic through Anctu or phone. If you have a critical or abnormal lab result, we will notify you by phone as soon as possible.  Submit refill requests through Anctu or call your pharmacy and they will forward the refill request to us. Please allow 3 business days for your refill to be completed.          Additional Information About Your Visit        Anctu Information     Anctu gives you secure access to your electronic health record. If you see a primary care provider, you can also send messages to your care team and make appointments. If you have questions, please call your primary care clinic.  If you do not " have a primary care provider, please call [phone] and they will assist you.        Care EveryWhere ID     This is your Care EveryWhere ID. This could be used by other organizations to access your [facility] medical records  [id number]         Blood Pressure from Last 3 Encounters:   11/01/18 126/84   10/28/18 126/69   10/12/18 130/88    Weight from Last 3 Encounters:   11/01/18 61.2 kg (135 lb)   10/27/18 60.8 kg (134 lb 1.6 oz)   10/12/18 61.2 kg (135 lb)              We Performed the Following     CANALITH REPOSITIONING, PER DAY        Primary Care Provider Office Phone # Fax #    Broderick Siu PA-C [phone]       [address] [place] [address]        Equal Access to Services     ROMAN HANNAH : Hadii bon talboto Soericka, waaxda luqadaha, qaybta kaalmada cammy, alice fiore . So [facility] [phone].    ATENCIÓN: Si habla español, tiene a lin disposición servicios gratuitos de asistencia lingüística. [place] al [phone].    We comply with applicable federal civil rights laws and [place] laws. We do not discriminate on the basis of race, color, national origin, age, disability, sex, sexual orientation, or gender identity.            Thank you!     Thank you for choosing [facility]  for your care. Our goal is always to provide you with excellent care. Hearing back from our patients is one way we can continue to improve our services. Please take a few minutes to complete the written survey that you may receive in the mail after your visit with us. Thank you!             Your Updated Medication List - Protect others around you: Learn how to safely use, store and throw away your medicines at www.disposemymeds.org.          This list is accurate as of 11/5/18  2:16 PM.  Always use your most recent med list.                   Brand Name Dispense Instructions for use Diagnosis    fluticasone 50  MCG/ACT spray    FLONASE    1 Package    USE TWO SPRAYS IN EACH NOSTRIL DAILY    Allergic rhinitis, cause unspecified       lisinopril 10 MG tablet    PRINIVIL/ZESTRIL    90 tablet    TAKE 1 TABLET (10 MG) BY MOUTH DAILY    Benign essential hypertension       meclizine 25 MG tablet    ANTIVERT    30 tablet    Take 1 tablet (25 mg) by mouth 3 times daily as needed for dizziness    Vertigo       omega-3 fatty acids 1200 MG capsule      Take four tablets daily

## 2018-11-21 ENCOUNTER — RADIANT APPOINTMENT (OUTPATIENT)
Dept: MAMMOGRAPHY | Facility: OTHER | Age: 58
End: 2018-11-21
Attending: PHYSICIAN ASSISTANT
Payer: COMMERCIAL

## 2018-11-21 DIAGNOSIS — Z12.31 VISIT FOR SCREENING MAMMOGRAM: ICD-10-CM

## 2018-11-21 PROCEDURE — 77067 SCR MAMMO BI INCL CAD: CPT | Mod: TC

## 2019-04-11 PROBLEM — H81.11 BENIGN PAROXYSMAL POSITIONAL VERTIGO, RIGHT: Status: RESOLVED | Noted: 2018-11-01 | Resolved: 2019-04-11

## 2019-04-11 NOTE — PROGRESS NOTES
Discharge Note    Progress reporting period is from initial evaluation date (please see noted date below) to Nov 5, 2018.  Linked Episodes   Type: Episode: Status: Noted: Resolved: Last update: Updated by:   PHYSICAL THERAPY BPPV, dizziness 11-1-18 Active 11/1/2018 11/5/2018  1:50 PM Norm Earl, PT      Comments:       Rosanna failed to follow up and current status is unknown.  Please see information below for last relevant information on current status.  Patient seen for 2 visits.    SUBJECTIVE  Subjective changes noted by patient:  Pt reports she felt great on Friday and Saturday. She then took a long road trip over the weekend, fell asleep in the car and bobbed her head, states she has felt a little off since, unsure if it is anxiety or actually due to another bout of BPPV.   .  Current pain level is 0/10.     Previous pain level was  0/10.   Changes in function:  Yes (See Goal flowsheet attached for changes in current functional level)  Adverse reaction to treatment or activity: None    OBJECTIVE  Changes noted in objective findings: Re test halpike asael with 3-4 beat (+) for RPCC, much improved from last visit but remains. Treated again with CRT for RPCC      ASSESSMENT/PLAN  Diagnosis: Dizziness, BPPV   Updated problem list and treatment plan:   Resolved following visit   STG/LTGs have been met or progress has been made towards goals:  Yes, please see goal flowsheet for most current information  Assessment of Progress: current status is unknown.    Last current status: Pt is progressing as expected   Self Management Plans:  HEP  I have re-evaluated this patient and find that the nature, scope, duration and intensity of the therapy is appropriate for the medical condition of the patient.  Rosanna continues to require the following intervention to meet STG and LTG's:  HEP.    Recommendations:  Discharge with current home program.  Patient to follow up with MD as needed.    Please refer to the daily flowsheet for  treatment today, total treatment time and time spent performing 1:1 timed codes.

## 2019-06-28 ENCOUNTER — TELEPHONE (OUTPATIENT)
Dept: FAMILY MEDICINE | Facility: OTHER | Age: 59
End: 2019-06-28

## 2019-06-28 NOTE — TELEPHONE ENCOUNTER
Panel Management Review    Summary:    Patient is due/failing the following:   PHYSICAL    Action needed:   Patient needs office visit for Physical.    Type of outreach:    Phone, left message for patient to call back.     Questions for provider review:    None                                                                                                                                    Kimberly Villafana CMA ([facility])       Chart routed to Care Team .      Patient has the following on her problem list:     Hypertension   Last three blood pressure readings:  BP Readings from Last 3 Encounters:   11/01/18 126/84   10/28/18 126/69   10/12/18 130/88     Blood pressure: Passed    HTN Guidelines:  Less than 140/90      Composite cancer screening  Chart review shows that this patient is due/due soon for the following None

## 2019-06-28 NOTE — LETTER
[address]   [facility] [address]  Phone: [phone]  July 1, 2019      Rosanna Ivory  [address] [facility] [address]      Dear Rosanna,    We care about your health and have reviewed your health plan including your medical conditions, medications, and lab results.  Based on this review, it is recommended that you follow up regarding the following health topic(s):  -Wellness (Physical) Visit     We recommend you take the following action(s):  -schedule a WELLNESS (Physical) APPOINTMENT.  We will perform the following labs: Lipids (fasting cholesterol - nothing to eat except water and/or meds for 8-10 hours).     Please call us at the [facility] - [phone] (or use [employer]) to address the above recommendations.     Thank you for trusting [facility] and we appreciate the opportunity to serve you.  We look forward to supporting your healthcare needs in the future.    Healthy Regards,    Your Health Care Team  [facility]

## 2019-07-01 NOTE — TELEPHONE ENCOUNTER
Left message for patient to call back. Please see message below.  Letter sent as well.  Nany Alicea, PAULINA

## 2019-07-23 ENCOUNTER — HOSPITAL ENCOUNTER (EMERGENCY)
Facility: CLINIC | Age: 59
Discharge: OTHER INSTITUTION | End: 2019-07-23
Attending: EMERGENCY MEDICINE | Admitting: EMERGENCY MEDICINE
Payer: COMMERCIAL

## 2019-07-23 ENCOUNTER — APPOINTMENT (OUTPATIENT)
Dept: GENERAL RADIOLOGY | Facility: CLINIC | Age: 59
End: 2019-07-23
Attending: EMERGENCY MEDICINE
Payer: COMMERCIAL

## 2019-07-23 ENCOUNTER — HOSPITAL ENCOUNTER (INPATIENT)
Facility: CLINIC | Age: 59
LOS: 1 days | Discharge: HOME | End: 2019-07-24
Attending: INTERNAL MEDICINE | Admitting: INTERNAL MEDICINE
Payer: COMMERCIAL

## 2019-07-23 VITALS
TEMPERATURE: 98.1 F | HEART RATE: 86 BPM | RESPIRATION RATE: 11 BRPM | DIASTOLIC BLOOD PRESSURE: 104 MMHG | BODY MASS INDEX: 24.5 KG/M2 | WEIGHT: 145 LBS | SYSTOLIC BLOOD PRESSURE: 155 MMHG | OXYGEN SATURATION: 98 %

## 2019-07-23 DIAGNOSIS — F17.290 CIGAR SMOKER: ICD-10-CM

## 2019-07-23 DIAGNOSIS — I21.4 NSTEMI (NON-ST ELEVATED MYOCARDIAL INFARCTION) (H): ICD-10-CM

## 2019-07-23 PROBLEM — I24.9 ACS (ACUTE CORONARY SYNDROME) (H): Status: ACTIVE | Noted: 2019-07-23

## 2019-07-23 LAB
ALBUMIN SERPL-MCNC: 4 G/DL (ref 3.4–5)
ALP SERPL-CCNC: 142 U/L (ref 40–150)
ALT SERPL W P-5'-P-CCNC: 20 U/L (ref 0–50)
ANION GAP SERPL CALCULATED.3IONS-SCNC: 11 MMOL/L (ref 3–14)
AST SERPL W P-5'-P-CCNC: 28 U/L (ref 0–45)
BASOPHILS # BLD AUTO: 0.1 10E9/L (ref 0–0.2)
BASOPHILS NFR BLD AUTO: 0.8 %
BILIRUB SERPL-MCNC: 1 MG/DL (ref 0.2–1.3)
BUN SERPL-MCNC: 8 MG/DL (ref 7–30)
CALCIUM SERPL-MCNC: 9.4 MG/DL (ref 8.5–10.1)
CHLORIDE SERPL-SCNC: 86 MMOL/L (ref 94–109)
CO2 SERPL-SCNC: 28 MMOL/L (ref 20–32)
CREAT SERPL-MCNC: 0.54 MG/DL (ref 0.52–1.04)
DIFFERENTIAL METHOD BLD: NORMAL
EOSINOPHIL NFR BLD AUTO: 0.8 %
ERYTHROCYTE [DISTWIDTH] IN BLOOD BY AUTOMATED COUNT: 11.1 % (ref 10–15)
ERYTHROCYTE [DISTWIDTH] IN BLOOD BY AUTOMATED COUNT: 11.4 % (ref 10–15)
GFR SERPL CREATININE-BSD FRML MDRD: >90 ML/MIN/{1.73_M2}
GLUCOSE SERPL-MCNC: 89 MG/DL (ref 70–99)
HCT VFR BLD AUTO: 38.7 % (ref 35–47)
HCT VFR BLD AUTO: 39.2 % (ref 35–47)
HGB BLD-MCNC: 13.6 G/DL (ref 11.7–15.7)
HGB BLD-MCNC: 13.6 G/DL (ref 11.7–15.7)
IMM GRANULOCYTES # BLD: 0 10E9/L (ref 0–0.4)
IMM GRANULOCYTES NFR BLD: 0.2 %
LIPASE SERPL-CCNC: 138 U/L (ref 73–393)
LMWH PPP CHRO-ACNC: 0.61 IU/ML
LYMPHOCYTES # BLD AUTO: 0.9 10E9/L (ref 0.8–5.3)
LYMPHOCYTES NFR BLD AUTO: 13.9 %
MCH RBC QN AUTO: 30.8 PG (ref 26.5–33)
MCH RBC QN AUTO: 30.9 PG (ref 26.5–33)
MCHC RBC AUTO-ENTMCNC: 34.7 G/DL (ref 31.5–36.5)
MCHC RBC AUTO-ENTMCNC: 35.1 G/DL (ref 31.5–36.5)
MCV RBC AUTO: 88 FL (ref 78–100)
MCV RBC AUTO: 89 FL (ref 78–100)
MONOCYTES # BLD AUTO: 0.7 10E9/L (ref 0–1.3)
MONOCYTES NFR BLD AUTO: 11 %
NEUTROPHILS # BLD AUTO: 4.5 10E9/L (ref 1.6–8.3)
NEUTROPHILS NFR BLD AUTO: 73.3 %
NRBC # BLD AUTO: 0 10*3/UL
NRBC BLD AUTO-RTO: 0 /100
PLATELET # BLD AUTO: 326 10E9/L (ref 150–450)
PLATELET # BLD AUTO: 333 10E9/L (ref 150–450)
POTASSIUM SERPL-SCNC: 3.7 MMOL/L (ref 3.4–5.3)
PROT SERPL-MCNC: 8 G/DL (ref 6.8–8.8)
RBC # BLD AUTO: 4.4 10E12/L (ref 3.8–5.2)
RBC # BLD AUTO: 4.42 10E12/L (ref 3.8–5.2)
SODIUM SERPL-SCNC: 125 MMOL/L (ref 133–144)
TROPONIN I SERPL-MCNC: 0.1 UG/L (ref 0–0.04)
TROPONIN I SERPL-MCNC: 0.11 UG/L (ref 0–0.04)
TROPONIN I SERPL-MCNC: 0.12 UG/L (ref 0–0.04)
WBC # BLD AUTO: 5.5 10E9/L (ref 4–11)
WBC # BLD AUTO: 6.2 10E9/L (ref 4–11)

## 2019-07-23 PROCEDURE — 93005 ELECTROCARDIOGRAM TRACING: CPT | Mod: 76

## 2019-07-23 PROCEDURE — 71046 X-RAY EXAM CHEST 2 VIEWS: CPT | Mod: TC

## 2019-07-23 PROCEDURE — 99223 1ST HOSP IP/OBS HIGH 75: CPT | Mod: AI | Performed by: PHYSICIAN ASSISTANT

## 2019-07-23 PROCEDURE — 93010 ELECTROCARDIOGRAM REPORT: CPT | Mod: Z6 | Performed by: EMERGENCY MEDICINE

## 2019-07-23 PROCEDURE — 25000132 ZZH RX MED GY IP 250 OP 250 PS 637: Performed by: PHYSICIAN ASSISTANT

## 2019-07-23 PROCEDURE — 96366 THER/PROPH/DIAG IV INF ADDON: CPT

## 2019-07-23 PROCEDURE — 83690 ASSAY OF LIPASE: CPT | Performed by: EMERGENCY MEDICINE

## 2019-07-23 PROCEDURE — 85025 COMPLETE CBC W/AUTO DIFF WBC: CPT | Performed by: EMERGENCY MEDICINE

## 2019-07-23 PROCEDURE — 25000128 H RX IP 250 OP 636: Performed by: EMERGENCY MEDICINE

## 2019-07-23 PROCEDURE — 99285 EMERGENCY DEPT VISIT HI MDM: CPT | Mod: 25 | Performed by: EMERGENCY MEDICINE

## 2019-07-23 PROCEDURE — 96375 TX/PRO/DX INJ NEW DRUG ADDON: CPT

## 2019-07-23 PROCEDURE — 96365 THER/PROPH/DIAG IV INF INIT: CPT

## 2019-07-23 PROCEDURE — 36415 COLL VENOUS BLD VENIPUNCTURE: CPT | Performed by: PHYSICIAN ASSISTANT

## 2019-07-23 PROCEDURE — 99285 EMERGENCY DEPT VISIT HI MDM: CPT | Mod: 25

## 2019-07-23 PROCEDURE — 25000132 ZZH RX MED GY IP 250 OP 250 PS 637: Performed by: EMERGENCY MEDICINE

## 2019-07-23 PROCEDURE — 96376 TX/PRO/DX INJ SAME DRUG ADON: CPT

## 2019-07-23 PROCEDURE — 93005 ELECTROCARDIOGRAM TRACING: CPT

## 2019-07-23 PROCEDURE — 85520 HEPARIN ASSAY: CPT | Performed by: INTERNAL MEDICINE

## 2019-07-23 PROCEDURE — 85027 COMPLETE CBC AUTOMATED: CPT | Performed by: PHYSICIAN ASSISTANT

## 2019-07-23 PROCEDURE — 80053 COMPREHEN METABOLIC PANEL: CPT | Performed by: EMERGENCY MEDICINE

## 2019-07-23 PROCEDURE — 84484 ASSAY OF TROPONIN QUANT: CPT | Performed by: EMERGENCY MEDICINE

## 2019-07-23 PROCEDURE — 21000001 ZZH R&B HEART CARE

## 2019-07-23 PROCEDURE — 93010 ELECTROCARDIOGRAM REPORT: CPT | Performed by: INTERNAL MEDICINE

## 2019-07-23 PROCEDURE — 25000125 ZZHC RX 250: Performed by: EMERGENCY MEDICINE

## 2019-07-23 PROCEDURE — 36415 COLL VENOUS BLD VENIPUNCTURE: CPT | Performed by: INTERNAL MEDICINE

## 2019-07-23 PROCEDURE — 84484 ASSAY OF TROPONIN QUANT: CPT | Performed by: PHYSICIAN ASSISTANT

## 2019-07-23 RX ORDER — FLUTICASONE PROPIONATE 50 MCG
2 SPRAY, SUSPENSION (ML) NASAL DAILY PRN
COMMUNITY

## 2019-07-23 RX ORDER — FOLIC ACID 1 MG/1
1 TABLET ORAL DAILY
Status: DISCONTINUED | OUTPATIENT
Start: 2019-07-23 | End: 2019-07-24 | Stop reason: HOSPADM

## 2019-07-23 RX ORDER — METOPROLOL TARTRATE 25 MG/1
25 TABLET, FILM COATED ORAL 2 TIMES DAILY
Status: DISCONTINUED | OUTPATIENT
Start: 2019-07-23 | End: 2019-07-24 | Stop reason: HOSPADM

## 2019-07-23 RX ORDER — LANOLIN ALCOHOL/MO/W.PET/CERES
100 CREAM (GRAM) TOPICAL DAILY
Status: DISCONTINUED | OUTPATIENT
Start: 2019-07-23 | End: 2019-07-24 | Stop reason: HOSPADM

## 2019-07-23 RX ORDER — ATORVASTATIN CALCIUM 40 MG/1
40 TABLET, FILM COATED ORAL EVERY EVENING
Status: DISCONTINUED | OUTPATIENT
Start: 2019-07-23 | End: 2019-07-24 | Stop reason: HOSPADM

## 2019-07-23 RX ORDER — HEPARIN SODIUM 10000 [USP'U]/100ML
0-3500 INJECTION, SOLUTION INTRAVENOUS CONTINUOUS
Status: DISCONTINUED | OUTPATIENT
Start: 2019-07-23 | End: 2019-07-23 | Stop reason: HOSPADM

## 2019-07-23 RX ORDER — LIDOCAINE 40 MG/G
CREAM TOPICAL
Status: DISCONTINUED | OUTPATIENT
Start: 2019-07-23 | End: 2019-07-24

## 2019-07-23 RX ORDER — ONDANSETRON 2 MG/ML
4 INJECTION INTRAMUSCULAR; INTRAVENOUS EVERY 6 HOURS PRN
Status: DISCONTINUED | OUTPATIENT
Start: 2019-07-23 | End: 2019-07-24 | Stop reason: HOSPADM

## 2019-07-23 RX ORDER — CHLORAL HYDRATE 500 MG
2 CAPSULE ORAL 2 TIMES DAILY PRN
COMMUNITY

## 2019-07-23 RX ORDER — ALUMINA, MAGNESIA, AND SIMETHICONE 2400; 2400; 240 MG/30ML; MG/30ML; MG/30ML
30 SUSPENSION ORAL EVERY 4 HOURS PRN
Status: DISCONTINUED | OUTPATIENT
Start: 2019-07-23 | End: 2019-07-24 | Stop reason: HOSPADM

## 2019-07-23 RX ORDER — ONDANSETRON 4 MG/1
4 TABLET, ORALLY DISINTEGRATING ORAL EVERY 6 HOURS PRN
Status: DISCONTINUED | OUTPATIENT
Start: 2019-07-23 | End: 2019-07-24 | Stop reason: HOSPADM

## 2019-07-23 RX ORDER — ASPIRIN 81 MG/1
324 TABLET, CHEWABLE ORAL ONCE
Status: COMPLETED | OUTPATIENT
Start: 2019-07-23 | End: 2019-07-23

## 2019-07-23 RX ORDER — ACETAMINOPHEN 325 MG/1
650 TABLET ORAL EVERY 4 HOURS PRN
Status: DISCONTINUED | OUTPATIENT
Start: 2019-07-23 | End: 2019-07-24 | Stop reason: HOSPADM

## 2019-07-23 RX ORDER — ASPIRIN 81 MG/1
324 TABLET, CHEWABLE ORAL ONCE
Status: DISCONTINUED | OUTPATIENT
Start: 2019-07-23 | End: 2019-07-23

## 2019-07-23 RX ORDER — LISINOPRIL 10 MG/1
10 TABLET ORAL DAILY
Status: DISCONTINUED | OUTPATIENT
Start: 2019-07-24 | End: 2019-07-24 | Stop reason: HOSPADM

## 2019-07-23 RX ORDER — ACETAMINOPHEN 650 MG/1
650 SUPPOSITORY RECTAL EVERY 4 HOURS PRN
Status: DISCONTINUED | OUTPATIENT
Start: 2019-07-23 | End: 2019-07-24 | Stop reason: HOSPADM

## 2019-07-23 RX ORDER — ASPIRIN 325 MG
325 TABLET ORAL DAILY
Status: DISCONTINUED | OUTPATIENT
Start: 2019-07-24 | End: 2019-07-24 | Stop reason: HOSPADM

## 2019-07-23 RX ORDER — NALOXONE HYDROCHLORIDE 0.4 MG/ML
.1-.4 INJECTION, SOLUTION INTRAMUSCULAR; INTRAVENOUS; SUBCUTANEOUS
Status: DISCONTINUED | OUTPATIENT
Start: 2019-07-23 | End: 2019-07-24

## 2019-07-23 RX ORDER — MULTIPLE VITAMINS W/ MINERALS TAB 9MG-400MCG
1 TAB ORAL DAILY
Status: DISCONTINUED | OUTPATIENT
Start: 2019-07-23 | End: 2019-07-24 | Stop reason: HOSPADM

## 2019-07-23 RX ORDER — NITROGLYCERIN 0.4 MG/1
0.4 TABLET SUBLINGUAL EVERY 5 MIN PRN
Status: DISCONTINUED | OUTPATIENT
Start: 2019-07-23 | End: 2019-07-24 | Stop reason: HOSPADM

## 2019-07-23 RX ORDER — SODIUM CHLORIDE 9 MG/ML
INJECTION, SOLUTION INTRAVENOUS CONTINUOUS
Status: DISCONTINUED | OUTPATIENT
Start: 2019-07-23 | End: 2019-07-24

## 2019-07-23 RX ORDER — MORPHINE SULFATE 2 MG/ML
1 INJECTION, SOLUTION INTRAMUSCULAR; INTRAVENOUS
Status: DISCONTINUED | OUTPATIENT
Start: 2019-07-23 | End: 2019-07-24 | Stop reason: HOSPADM

## 2019-07-23 RX ADMIN — METOPROLOL TARTRATE 25 MG: 25 TABLET, FILM COATED ORAL at 21:56

## 2019-07-23 RX ADMIN — MULTIPLE VITAMINS W/ MINERALS TAB 1 TABLET: TAB at 21:55

## 2019-07-23 RX ADMIN — FOLIC ACID 1 MG: 1 TABLET ORAL at 21:55

## 2019-07-23 RX ADMIN — NITROGLYCERIN 15 MG: 20 OINTMENT TOPICAL at 14:21

## 2019-07-23 RX ADMIN — Medication 100 MG: at 21:55

## 2019-07-23 RX ADMIN — ASPIRIN 81 MG 324 MG: 81 TABLET ORAL at 14:03

## 2019-07-23 RX ADMIN — HEPARIN SODIUM 800 UNITS/HR: 10000 INJECTION, SOLUTION INTRAVENOUS at 14:14

## 2019-07-23 RX ADMIN — FAMOTIDINE 20 MG: 10 INJECTION, SOLUTION INTRAVENOUS at 13:21

## 2019-07-23 RX ADMIN — ATORVASTATIN CALCIUM 40 MG: 40 TABLET, FILM COATED ORAL at 21:55

## 2019-07-23 ASSESSMENT — ACTIVITIES OF DAILY LIVING (ADL): ADLS_ACUITY_SCORE: 15

## 2019-07-23 NOTE — PLAN OF CARE
A&O x4. Pt denied pain. VSS. Up independently. Tele shows SR.  Hep gtt @ 800 w/ recheck at 2000. NPO @ midnight. CIWA to monitor for ETOH withdrawal. Plan for Echo and cardiology consult tomorrow. Continue to monitor.

## 2019-07-23 NOTE — PHARMACY-ADMISSION MEDICATION HISTORY
Admission medication history interview status for the 7/23/2019  admission is complete. See EPIC admission navigator for prior to admission medications     Medication history source reliability:Good    Actions taken by pharmacist (provider contacted, etc):None     Additional medication history information not noted on PTA med list :None    Medication reconciliation/reorder completed by provider prior to medication history? No    Time spent in this activity: 10min    Prior to Admission medications    Medication Sig Last Dose Taking? Auth Provider   fish oil-omega-3 fatty acids 1000 MG capsule Take 2 g by mouth 2 times daily as needed Past Month at Unknown time Yes Unknown, Entered By History   fluticasone (FLONASE) 50 MCG/ACT nasal spray Spray 2 sprays into both nostrils daily as needed for rhinitis or allergies Past Month at Unknown time Yes Unknown, Entered By History   lisinopril (PRINIVIL/ZESTRIL) 10 MG tablet TAKE 1 TABLET (10 MG) BY MOUTH DAILY 7/23/2019 at Unknown time Yes Broderick Siu PA-C

## 2019-07-23 NOTE — ED PROVIDER NOTES
"  History     Chief Complaint   Patient presents with     Chest Pain     HPI  History per patient and medical records    This is a 58-year-old female with history of hypertension, hyperlipidemia, GERD, alcohol use presenting with chest pain.  Patient first noted chest pain at bedtime for 3 days ago.  She describes the pain in her substernal area with radiation up into her throat and notes that it is a burning pain.  It seems to relent after she laid down but when she woke up on Sunday, 2 days ago, she had some of the same pain.  She did take some Tums with some relief.  She feels that she has some soreness in her esophagus and throat.  She notes that she had history of significant reflux symptoms that took \"a long time to get under control\" 10 to 15 years ago.  She had an endoscopy at the time and states she was on some medications but no longer takes any.  Yesterday she woke up and did not have pain but noted the pain increased after she got up and moved around.  It relented throughout the day.  This morning she got up, ate a banana, and went to her Pilates class.  She notes feeling that she was a little bit \"foggy or floating\" and had mild shortness of breath in the Pilates class.  Afterwards, her pain significantly increased.  She went to urgent care where they gave her a GI cocktail which temporarily relieved the pain.  They did an EKG that apparently was different from an EKG seen in 2013.  In addition, her blood pressure was noted to be high so she was sent to the ED.  Patient is on lisinopril and has been on it for about a year with no recent changes.  She has been taking it as prescribed.  She is not on any cholesterol medications.  She quit smoking in March and only occasionally uses an e-cigarette.  She does drink alcohol, specifically beer.  She currently notes a slight burning pain in her upper chest and into her throat.  She is also had an occasional slight abdominal/epigastric pain.  She does still " have her gallbladder.  She said mild nausea, no vomiting.  No constipation or diarrhea.  No blackness or blood in her stools.  Normal urination.  No history of cardiac disease.  No significant family history of cardiac disease.  She denies taking nonsteroidal anti-inflammatory agents.    Allergies:  Allergies   Allergen Reactions     Iodine        Problem List:    Patient Active Problem List    Diagnosis Date Noted     Vertigo 10/27/2018     Priority: Medium     Cigarette smoker 10/27/2018     Priority: Medium     Alcohol dependence (H) 2018     Priority: Medium     Benign essential hypertension 10/12/2017     Priority: Medium     CARDIOVASCULAR SCREENING; LDL GOAL LESS THAN 160 10/31/2010     Priority: Medium     Screening for cardiovascular condition 10/31/2010     Priority: Medium     Irregular menstrual bleeding 2010     Priority: Medium     External hemorrhoids with other complication 2008     Priority: Medium     External hemorrhoids      Priority: Medium     Problem list name updated by automated process. Provider to review       Hyperlipidemia 2002     Priority: Medium     Allergic rhinitis 2001     Priority: Medium     Problem list name updated by automated process. Provider to review          Past Medical History:    Past Medical History:   Diagnosis Date     Alcohol abuse      Allergic rhinitis, cause unspecified      ASCUS on Pap smear 7/30/10     Esophageal reflux      External hemorrhoids without mention of complication      History of colposcopy with cervical biopsy 8/23/10     Other chest pain      Pure hypercholesterolemia        Past Surgical History:    Past Surgical History:   Procedure Laterality Date     C  DELIVERY ONLY      , Low Cervical     COLONOSCOPY  02/01/10    with snare polypectomy     HC CARDIAC STRESS TST,COMPLETE      normal per pt     HC HEMORRHOIDECTOMY,INT/EXT,SIMPLE  02/18/10       Family History:    Family  History   Problem Relation Age of Onset     Diabetes Maternal Grandmother      Hypertension Maternal Grandmother      Hypertension Father      Asthma Father      Gastrointestinal Disease Father         hiatal hernia     Diabetes Father         adult     Prostate Cancer Paternal Grandfather      Circulatory Paternal Grandfather         anyurism     Obesity Sister      Lipids Sister      Alzheimer Disease Mother      Breast Cancer Maternal Aunt         after menopause     Breast Cancer Paternal Aunt         after menopause     C.A.D. No family hx of        Social History:  Marital Status:   [2]  Social History     Tobacco Use     Smoking status: Current Every Day Smoker     Packs/day: 0.50     Years: 6.00     Pack years: 3.00     Types: Cigarettes     Smokeless tobacco: Never Used   Substance Use Topics     Alcohol use: Yes     Alcohol/week: 15.0 oz     Types: 30 Cans of beer per week     Drug use: No        Medications:      fluticasone (FLONASE) 50 MCG/ACT nasal spray   lisinopril (PRINIVIL/ZESTRIL) 10 MG tablet   OMEGA-3 FATTY ACIDS 1200 MG OR CAPS   meclizine (ANTIVERT) 25 MG tablet         Review of Systems   All other ROS reviewed and are negative or non-contributory except as stated in HPI.     Physical Exam   BP: (!) 207/136  Pulse: 92  Heart Rate: 66  Temp: 98.1  F (36.7  C)  Resp: 18  Weight: 65.8 kg (145 lb)  SpO2: 100 %      Physical Exam   Constitutional: She is oriented to person, place, and time. She appears well-developed and well-nourished. No distress.   Healthy-appearing but anxious, fit female lying in the bed   HENT:   Head: Normocephalic and atraumatic.   Eyes: Pupils are equal, round, and reactive to light. EOM are normal.   No scleral icterus   Neck: Normal range of motion. Neck supple.   Cardiovascular: Normal rate, regular rhythm, intact distal pulses and normal pulses.   No murmur heard.  Pulmonary/Chest: Effort normal and breath sounds normal.   Abdominal: Soft. She exhibits no  distension and no mass. There is no tenderness. There is no rebound and no guarding.   Musculoskeletal: Normal range of motion.        Right lower leg: She exhibits no tenderness and no edema.        Left lower leg: She exhibits no tenderness and no edema.   Neurological: She is alert and oriented to person, place, and time. No cranial nerve deficit.   Skin: Skin is warm and dry. No rash noted. No pallor.   Psychiatric: Her behavior is normal. Her mood appears anxious.   Vitals reviewed.      ED Course (with Medical Decision Making)    Pt seen and examined by me.  RN and EPIC notes reviewed.      Patient with chest pain symptoms.  Pain is described as burning pain and seems to be in the upper chest and throat.  Suspect this is reflux.  However, there was some unknown changes on her EKG.  She has risk factors of high blood pressure and in the ED is noted to have significant elevation in her blood pressure along with unmedicated hyperlipidemia.  Possible GI related such as pancreatitis especially with her alcohol history.    Plan IV, EKG, labs, chest x-ray.  I am going to give her some Pepcid as the GI cocktail previously helped her.    EKG done at 1247 and seen at bedside.  It shows what appears to be some unusual changes, mild ST elevation in leads V1 to along with some T wave inversion in leads I and aVL.  This is compared to EKG in 2013.  She otherwise has sinus rhythm at a rate of 79.    Normal white count.  Mild hyponatremia with sodium 125.  Lipase is normal as are LFTs.  Patient's troponin is mildly elevated at 0.21.  Chest x-ray is clear.  She was given aspirin p.o.  I spoke with cardiology at [facility], Dr. Morales.  He reviewed her EKG and noted that she should be transferred and likely will get an angiogram.  I started heparin and placed Nitropaste.  Patient's blood pressures had improved significantly without any medications while she was in the ED.    All the results were discussed with the  patient and she is to be transferred via EMS.  I spoke with the hospitalist, Dr. Martinez, at [facility] who has graciously accepted the patient for transfer.  She is stable.    I did do repeat EKG which shows no acute changes from the first EKG.        Procedures      Results for orders placed or performed during the hospital encounter of 07/23/19 (from the past 24 hour(s))   CBC with platelets differential   Result Value Ref Range    WBC 6.2 4.0 - 11.0 10e9/L    RBC Count 4.40 3.8 - 5.2 10e12/L    Hemoglobin 13.6 11.7 - 15.7 g/dL    Hematocrit 39.2 35.0 - 47.0 %    MCV 89 78 - 100 fl    MCH 30.9 26.5 - 33.0 pg    MCHC 34.7 31.5 - 36.5 g/dL    RDW 11.1 10.0 - 15.0 %    Platelet Count 326 150 - 450 10e9/L    Diff Method Automated Method     % Neutrophils 73.3 %    % Lymphocytes 13.9 %    % Monocytes 11.0 %    % Eosinophils 0.8 %    % Basophils 0.8 %    % Immature Granulocytes 0.2 %    Nucleated RBCs 0 0 /100    Absolute Neutrophil 4.5 1.6 - 8.3 10e9/L    Absolute Lymphocytes 0.9 0.8 - 5.3 10e9/L    Absolute Monocytes 0.7 0.0 - 1.3 10e9/L    Absolute Basophils 0.1 0.0 - 0.2 10e9/L    Abs Immature Granulocytes 0.0 0 - 0.4 10e9/L    Absolute Nucleated RBC 0.0    Comprehensive metabolic panel   Result Value Ref Range    Sodium 125 (L) 133 - 144 mmol/L    Potassium 3.7 3.4 - 5.3 mmol/L    Chloride 86 (L) 94 - 109 mmol/L    Carbon Dioxide 28 20 - 32 mmol/L    Anion Gap 11 3 - 14 mmol/L    Glucose 89 70 - 99 mg/dL    Urea Nitrogen 8 7 - 30 mg/dL    Creatinine 0.54 0.52 - 1.04 mg/dL    GFR Estimate >90 >60 mL/min/[1.73_m2]    GFR Estimate If Black >90 >60 mL/min/[1.73_m2]    Calcium 9.4 8.5 - 10.1 mg/dL    Bilirubin Total 1.0 0.2 - 1.3 mg/dL    Albumin 4.0 3.4 - 5.0 g/dL    Protein Total 8.0 6.8 - 8.8 g/dL    Alkaline Phosphatase 142 40 - 150 U/L    ALT 20 0 - 50 U/L    AST 28 0 - 45 U/L   Lipase   Result Value Ref Range    Lipase 138 73 - 393 U/L   Troponin I   Result Value Ref Range    Troponin I ES 0.121 ()  0.000 - 0.045 ug/L   XR Chest 2 Views    Narrative    XR CHEST 2 VW   7/23/2019 1:15 PM     HISTORY: chest pain    COMPARISON: 7/29/2013      Impression    IMPRESSION: No acute cardiopulmonary disease.    ARNAUD LOLYD MD       Medications   heparin  drip 25,000 units in 0.45% NaCl 250 mL (see additional administration details for dose) (800 Units/hr Intravenous New Bag 7/23/19 1414)   nitroGLYcerin (NITRO-BID) ointment REMOVAL (has no administration in time range)   famotidine (PEPCID) injection 20 mg (20 mg Intravenous Given 7/23/19 1321)   aspirin (ASA) chewable tablet 324 mg (324 mg Oral Given 7/23/19 1403)   heparin Loading Dose from infusion pump *Give when STARTING heparin infusion 3,950 Units (3,950 Units Intravenous Given 7/23/19 1415)   nitroGLYcerin (NITRO-BID) 2 % ointment 15 mg (15 mg Transdermal Given 7/23/19 1421)       Assessments & Plan     I have reviewed the findings, diagnosis, plan with the patient.       Medication List      There are no discharge medications for this visit.         Final diagnoses:   NSTEMI (non-ST elevated myocardial infarction) (H)     Disposition: Patient transferred to [facility] for cardiology evaluation and care in stable condition.    Note: Chart documentation done in part with Dragon Voice Recognition software. Although reviewed after completion, some word and grammatical errors may remain.     7/23/2019   [facility] EMERGENCY DEPARTMENT     Bhavya Rolle MD  07/23/19 1831

## 2019-07-23 NOTE — ED TRIAGE NOTES
Pt c/o CP and dizziness x 3 days.  States it started Sunday night after eating greasy foods.  Seems to be worse today after activity.

## 2019-07-24 ENCOUNTER — SURGERY (OUTPATIENT)
Age: 59
End: 2019-07-24
Payer: COMMERCIAL

## 2019-07-24 ENCOUNTER — APPOINTMENT (OUTPATIENT)
Dept: CARDIOLOGY | Facility: CLINIC | Age: 59
End: 2019-07-24
Attending: PHYSICIAN ASSISTANT
Payer: COMMERCIAL

## 2019-07-24 VITALS
DIASTOLIC BLOOD PRESSURE: 72 MMHG | RESPIRATION RATE: 16 BRPM | BODY MASS INDEX: 23.66 KG/M2 | OXYGEN SATURATION: 96 % | SYSTOLIC BLOOD PRESSURE: 119 MMHG | TEMPERATURE: 98.2 F | WEIGHT: 140 LBS | HEART RATE: 73 BPM

## 2019-07-24 DIAGNOSIS — I21.4 NSTEMI (NON-ST ELEVATED MYOCARDIAL INFARCTION) (H): Primary | ICD-10-CM

## 2019-07-24 LAB
ANION GAP SERPL CALCULATED.3IONS-SCNC: 3 MMOL/L (ref 3–14)
BUN SERPL-MCNC: 9 MG/DL (ref 7–30)
CALCIUM SERPL-MCNC: 8.6 MG/DL (ref 8.5–10.1)
CHLORIDE SERPL-SCNC: 96 MMOL/L (ref 94–109)
CHOLEST SERPL-MCNC: 218 MG/DL
CO2 SERPL-SCNC: 32 MMOL/L (ref 20–32)
CREAT SERPL-MCNC: 0.52 MG/DL (ref 0.52–1.04)
GFR SERPL CREATININE-BSD FRML MDRD: >90 ML/MIN/{1.73_M2}
GLUCOSE SERPL-MCNC: 87 MG/DL (ref 70–99)
HDLC SERPL-MCNC: 101 MG/DL
KCT BLD-ACNC: 175 SEC (ref 75–150)
LDLC SERPL CALC-MCNC: 109 MG/DL
LMWH PPP CHRO-ACNC: 0.25 IU/ML
NONHDLC SERPL-MCNC: 117 MG/DL
POTASSIUM SERPL-SCNC: 4.1 MMOL/L (ref 3.4–5.3)
SODIUM SERPL-SCNC: 131 MMOL/L (ref 133–144)
TRIGL SERPL-MCNC: 42 MG/DL

## 2019-07-24 PROCEDURE — 36415 COLL VENOUS BLD VENIPUNCTURE: CPT | Performed by: PHYSICIAN ASSISTANT

## 2019-07-24 PROCEDURE — 80061 LIPID PANEL: CPT | Performed by: PHYSICIAN ASSISTANT

## 2019-07-24 PROCEDURE — 85520 HEPARIN ASSAY: CPT | Performed by: PHYSICIAN ASSISTANT

## 2019-07-24 PROCEDURE — 93306 TTE W/DOPPLER COMPLETE: CPT | Mod: 26 | Performed by: INTERNAL MEDICINE

## 2019-07-24 PROCEDURE — 93454 CORONARY ARTERY ANGIO S&I: CPT | Performed by: INTERNAL MEDICINE

## 2019-07-24 PROCEDURE — 99223 1ST HOSP IP/OBS HIGH 75: CPT | Performed by: INTERNAL MEDICINE

## 2019-07-24 PROCEDURE — B2111ZZ FLUOROSCOPY OF MULTIPLE CORONARY ARTERIES USING LOW OSMOLAR CONTRAST: ICD-10-PCS | Performed by: INTERNAL MEDICINE

## 2019-07-24 PROCEDURE — 93306 TTE W/DOPPLER COMPLETE: CPT

## 2019-07-24 PROCEDURE — C1894 INTRO/SHEATH, NON-LASER: HCPCS | Performed by: INTERNAL MEDICINE

## 2019-07-24 PROCEDURE — 27210794 ZZH OR GENERAL SUPPLY STERILE: Performed by: INTERNAL MEDICINE

## 2019-07-24 PROCEDURE — 99152 MOD SED SAME PHYS/QHP 5/>YRS: CPT | Performed by: INTERNAL MEDICINE

## 2019-07-24 PROCEDURE — 25000128 H RX IP 250 OP 636: Performed by: INTERNAL MEDICINE

## 2019-07-24 PROCEDURE — 85347 COAGULATION TIME ACTIVATED: CPT

## 2019-07-24 PROCEDURE — 80048 BASIC METABOLIC PNL TOTAL CA: CPT | Performed by: PHYSICIAN ASSISTANT

## 2019-07-24 PROCEDURE — 99153 MOD SED SAME PHYS/QHP EA: CPT | Performed by: INTERNAL MEDICINE

## 2019-07-24 PROCEDURE — 99239 HOSP IP/OBS DSCHRG MGMT >30: CPT | Performed by: INTERNAL MEDICINE

## 2019-07-24 PROCEDURE — 25000125 ZZHC RX 250: Performed by: INTERNAL MEDICINE

## 2019-07-24 PROCEDURE — 25000132 ZZH RX MED GY IP 250 OP 250 PS 637: Performed by: PHYSICIAN ASSISTANT

## 2019-07-24 PROCEDURE — 93454 CORONARY ARTERY ANGIO S&I: CPT | Mod: 26 | Performed by: INTERNAL MEDICINE

## 2019-07-24 PROCEDURE — 25800030 ZZH RX IP 258 OP 636: Performed by: PHYSICIAN ASSISTANT

## 2019-07-24 PROCEDURE — C1769 GUIDE WIRE: HCPCS | Performed by: INTERNAL MEDICINE

## 2019-07-24 RX ORDER — LORAZEPAM 0.5 MG/1
0.5 TABLET ORAL
Status: DISCONTINUED | OUTPATIENT
Start: 2019-07-24 | End: 2019-07-24 | Stop reason: HOSPADM

## 2019-07-24 RX ORDER — POTASSIUM CHLORIDE 1500 MG/1
20 TABLET, EXTENDED RELEASE ORAL
Status: DISCONTINUED | OUTPATIENT
Start: 2019-07-24 | End: 2019-07-24 | Stop reason: HOSPADM

## 2019-07-24 RX ORDER — NOREPINEPHRINE BITARTRATE 0.06 MG/ML
.03-.4 INJECTION, SOLUTION INTRAVENOUS CONTINUOUS PRN
Status: DISCONTINUED | OUTPATIENT
Start: 2019-07-24 | End: 2019-07-24 | Stop reason: HOSPADM

## 2019-07-24 RX ORDER — ACETAMINOPHEN 325 MG/1
650 TABLET ORAL EVERY 4 HOURS PRN
Status: DISCONTINUED | OUTPATIENT
Start: 2019-07-24 | End: 2019-07-24

## 2019-07-24 RX ORDER — EPTIFIBATIDE 2 MG/ML
180 INJECTION, SOLUTION INTRAVENOUS EVERY 10 MIN PRN
Status: DISCONTINUED | OUTPATIENT
Start: 2019-07-24 | End: 2019-07-24 | Stop reason: HOSPADM

## 2019-07-24 RX ORDER — LIDOCAINE 40 MG/G
CREAM TOPICAL
Status: DISCONTINUED | OUTPATIENT
Start: 2019-07-24 | End: 2019-07-24 | Stop reason: HOSPADM

## 2019-07-24 RX ORDER — ATROPINE SULFATE 0.1 MG/ML
0.5 INJECTION INTRAVENOUS EVERY 5 MIN PRN
Status: DISCONTINUED | OUTPATIENT
Start: 2019-07-24 | End: 2019-07-24 | Stop reason: HOSPADM

## 2019-07-24 RX ORDER — HYDROCODONE BITARTRATE AND ACETAMINOPHEN 5; 325 MG/1; MG/1
1-2 TABLET ORAL EVERY 4 HOURS PRN
Status: DISCONTINUED | OUTPATIENT
Start: 2019-07-24 | End: 2019-07-24 | Stop reason: HOSPADM

## 2019-07-24 RX ORDER — NALOXONE HYDROCHLORIDE 0.4 MG/ML
.1-.4 INJECTION, SOLUTION INTRAMUSCULAR; INTRAVENOUS; SUBCUTANEOUS
Status: DISCONTINUED | OUTPATIENT
Start: 2019-07-24 | End: 2019-07-24 | Stop reason: HOSPADM

## 2019-07-24 RX ORDER — LORAZEPAM 2 MG/ML
0.5 INJECTION INTRAMUSCULAR
Status: DISCONTINUED | OUTPATIENT
Start: 2019-07-24 | End: 2019-07-24 | Stop reason: HOSPADM

## 2019-07-24 RX ORDER — DOPAMINE HYDROCHLORIDE 160 MG/100ML
2-20 INJECTION, SOLUTION INTRAVENOUS CONTINUOUS PRN
Status: DISCONTINUED | OUTPATIENT
Start: 2019-07-24 | End: 2019-07-24 | Stop reason: HOSPADM

## 2019-07-24 RX ORDER — EPTIFIBATIDE 2 MG/ML
2 INJECTION, SOLUTION INTRAVENOUS CONTINUOUS PRN
Status: DISCONTINUED | OUTPATIENT
Start: 2019-07-24 | End: 2019-07-24 | Stop reason: HOSPADM

## 2019-07-24 RX ORDER — NITROGLYCERIN 20 MG/100ML
.07-2 INJECTION INTRAVENOUS CONTINUOUS PRN
Status: DISCONTINUED | OUTPATIENT
Start: 2019-07-24 | End: 2019-07-24 | Stop reason: HOSPADM

## 2019-07-24 RX ORDER — ARGATROBAN 1 MG/ML
350 INJECTION, SOLUTION INTRAVENOUS
Status: DISCONTINUED | OUTPATIENT
Start: 2019-07-24 | End: 2019-07-24 | Stop reason: HOSPADM

## 2019-07-24 RX ORDER — DOBUTAMINE HYDROCHLORIDE 200 MG/100ML
2-20 INJECTION INTRAVENOUS CONTINUOUS PRN
Status: DISCONTINUED | OUTPATIENT
Start: 2019-07-24 | End: 2019-07-24 | Stop reason: HOSPADM

## 2019-07-24 RX ORDER — SODIUM CHLORIDE 9 MG/ML
INJECTION, SOLUTION INTRAVENOUS CONTINUOUS
Status: DISCONTINUED | OUTPATIENT
Start: 2019-07-24 | End: 2019-07-24 | Stop reason: HOSPADM

## 2019-07-24 RX ORDER — FLUMAZENIL 0.1 MG/ML
0.2 INJECTION, SOLUTION INTRAVENOUS
Status: DISCONTINUED | OUTPATIENT
Start: 2019-07-24 | End: 2019-07-24 | Stop reason: HOSPADM

## 2019-07-24 RX ORDER — FENTANYL CITRATE 50 UG/ML
INJECTION, SOLUTION INTRAMUSCULAR; INTRAVENOUS
Status: DISCONTINUED | OUTPATIENT
Start: 2019-07-24 | End: 2019-07-24 | Stop reason: HOSPADM

## 2019-07-24 RX ORDER — NALOXONE HYDROCHLORIDE 0.4 MG/ML
.2-.4 INJECTION, SOLUTION INTRAMUSCULAR; INTRAVENOUS; SUBCUTANEOUS
Status: DISCONTINUED | OUTPATIENT
Start: 2019-07-24 | End: 2019-07-24 | Stop reason: HOSPADM

## 2019-07-24 RX ORDER — FENTANYL CITRATE 50 UG/ML
25-50 INJECTION, SOLUTION INTRAMUSCULAR; INTRAVENOUS
Status: DISCONTINUED | OUTPATIENT
Start: 2019-07-24 | End: 2019-07-24 | Stop reason: HOSPADM

## 2019-07-24 RX ORDER — ARGATROBAN 1 MG/ML
150 INJECTION, SOLUTION INTRAVENOUS
Status: DISCONTINUED | OUTPATIENT
Start: 2019-07-24 | End: 2019-07-24 | Stop reason: HOSPADM

## 2019-07-24 RX ADMIN — ASPIRIN 325 MG ORAL TABLET 325 MG: 325 PILL ORAL at 08:57

## 2019-07-24 RX ADMIN — METOPROLOL TARTRATE 25 MG: 25 TABLET, FILM COATED ORAL at 08:57

## 2019-07-24 RX ADMIN — SODIUM CHLORIDE: 9 INJECTION, SOLUTION INTRAVENOUS at 00:14

## 2019-07-24 RX ADMIN — LIDOCAINE HYDROCHLORIDE 8 ML: 10 INJECTION, SOLUTION INFILTRATION; PERINEURAL at 15:35

## 2019-07-24 RX ADMIN — MIDAZOLAM 1 MG: 1 INJECTION INTRAMUSCULAR; INTRAVENOUS at 15:28

## 2019-07-24 RX ADMIN — FENTANYL CITRATE 50 MCG: 50 INJECTION, SOLUTION INTRAMUSCULAR; INTRAVENOUS at 15:27

## 2019-07-24 RX ADMIN — MULTIPLE VITAMINS W/ MINERALS TAB 1 TABLET: TAB at 08:57

## 2019-07-24 RX ADMIN — FENTANYL CITRATE 50 MCG: 50 INJECTION, SOLUTION INTRAMUSCULAR; INTRAVENOUS at 15:30

## 2019-07-24 RX ADMIN — LISINOPRIL 10 MG: 10 TABLET ORAL at 08:57

## 2019-07-24 RX ADMIN — MIDAZOLAM 1 MG: 1 INJECTION INTRAMUSCULAR; INTRAVENOUS at 15:31

## 2019-07-24 RX ADMIN — FOLIC ACID 1 MG: 1 TABLET ORAL at 08:57

## 2019-07-24 RX ADMIN — Medication 100 MG: at 08:57

## 2019-07-24 ASSESSMENT — ACTIVITIES OF DAILY LIVING (ADL)
ADLS_ACUITY_SCORE: 15
ADLS_ACUITY_SCORE: 10

## 2019-07-24 NOTE — DISCHARGE SUMMARY
[facility]    Discharge Summary  Hospitalist    Date of Admission:  7/23/2019  Date of Discharge:  7/24/2019  Discharging Provider: Rose Gil    Discharge Diagnoses   NSTEMI, no obstructive CAD noted on coronary angiogram  Hypertension  Dyslipidemia  Hyponatremia: Improved  Alcohol Use    History of Present Illness   Rosanna Ivory is a 58 year old female with PMHx of hypertension, hyperlipidemia, GERD, tobacco and alcohol use who was admitted from [facility] on 7/23/2019 for management of an NSTEMI.    Hospital Course   Rosanna Ivory was admitted on 7/23/2019.  The following problems were addressed during her hospitalization:    NSTEMI, no obstructive CAD noted on coronary angiogram  Presented to [facility] with 3 days of intermittent chest discomfort which she described as a dull ache. She reports this initially felt similar to prior episodes of reflux, but she became concerned when it persisted for several days. She denies associated shortness of breath, nausea, diaphoresis. She has not noticed any decreased exercise tolerance recently and has no known cardiac history. She felt her discomfort had been triggered by activity in the preceding days. Risk factors include hypertension, hyperlipidemia, tobacco use history. Initial troponin in the ED was minimally elevated 0.1 and remained flat at 0.1 on recheck. EKG showed some changes from a prior EKG in 2013 with T-wave inversions in V1 and V2 and mild ST elevation in V1. This was reviewed, per ED provider report, by on-call Cardiology who did not feel it met STEMI criteria, recommended transfer to [facility] for a nonurgent consultation and possible coronary angiography. Was chest pain free on admission. Started on heparin gtt, aspirin, statin and metoprolol.    Serial trops remained flat at 0.1. Echocardiogram showed no WMAs, EF 55-60%, mild TR and no other significant valve disease. Was noted to have a  borderline ascending aorta -- discussed w/cardiology (Dr. Stevens) -- advised diet/lifestyle modifications, adequate BP control. No need to repeat echo. Underwent coronary angiogram this stay -- no obstructive disease was observed (report below). Etiology of chest pain unclear -- suspect musculoskeletal vs GERD.     Should follow up with PCP in the next week.      Hypertension   PTA: lisinopril 10mg daily  Continued on lisinopril. Metoprolol added this stay but given absence of CAD, was not continued at discharge.  Follow up with PCP for BP recheck -- may need further titration of antihypertensives in the future.     Dyslipidemia  FLP this stay showed , , t.cholest 218 and TG 42.  Statin was started this stay but given absence of significant CAD, was not continued at discharge.   Advised to resume fish oil and cont diet/lifestyle modifications     Hyponatremia: Improved  Suspect secondary to dehydration, possibly alcohol use. Na 125 on presentation, Cl  86. Started IVFs on admission. Na trending up: 125 -- 131. Encouraged adequate oral intake. Minimize EtOH use.     Alcohol Use   Reportedly drinks 3-6 beers nightly during the week and up to 8 beers on the weekends. Has no known history of withdrawal symptoms or seizure. Last drink was the evening prior to admission. CIWA scores remained low during stay. Advised to minimize EtOH use.    Rose Gil    Significant Results and Procedures   7/24/19 Echocardiogram:  Interpretation Summary     Left ventricular systolic function is normal. The visual ejection fraction is estimated at 55-60%. There is borderline concentric left ventricular hypertrophy.  There is mild biatrial enlargement.  The ascending aorta is borderline dilated to 3.9 cm above the ST ridge. There is mild trileaflet aortic sclerosis.  The right ventricular systolic function is normal. The right ventricle is normal size.  There is no comparison study  available.  _____________________________________________________________     Left Ventricle  The left ventricle is normal in size. There is borderline concentric left  ventricular hypertrophy. Left ventricular systolic function is normal. The  visual ejection fraction is estimated at 55-60%. Diastolic Doppler findings  (E/E' ratio and/or other parameters) suggest left ventricular filling  pressures are indeterminate. No regional wall motion abnormalities noted.     Right Ventricle  The right ventricle is normal size. The right ventricular systolic function is  normal.     Atria  There is mild biatrial enlargement. There is no atrial shunt seen.     Mitral Valve  There is trace mitral regurgitation.      Tricuspid Valve  There is mild (1+) tricuspid regurgitation. The right ventricular systolic  pressure is approximated at 23mmHg plus the right atrial pressure. IVC  diameter <2.1 cm collapsing >50% with sniff suggests a normal RA pressure of 3  mmHg.     Aortic Valve  There is mild trileaflet aortic sclerosis. No aortic regurgitation is present.  No hemodynamically significant valvular aortic stenosis.     Pulmonic Valve  There is no pulmonic valvular stenosis.     Vessels  The ascending aorta is Borderline dilated.     Pericardium  The pericardium appears normal.     Rhythm  Sinus rhythm was noted.      7/24/19 Cardiac Cath:  Left Main   The vessel was visualized by selective angiography and is moderate in size. There was 0% vessel disease.   Left Anterior Descending   The vessel was visualized by selective angiography and is moderate in size. There was 0% vessel disease.   Left Circumflex   The vessel was visualized by selective angiography and is moderate in size. There was 0% vessel disease.   Right Coronary Artery   The vessel was visualized by selective angiography and is moderate in size. There was 0% vessel disease.     Conclusion:  1.  Normal coronary angiography  2.  Unclear etiology of minimal troponin  elevation and presentation    Plan:  Continue medical management and risk factor modification as appropriate      Code Status   Full Code       Primary Care Physician   Broderick Siu    Physical Exam   Temp: 98.2  F (36.8  C) Temp src: Oral BP: 115/75 Pulse: 56 Heart Rate: 55 Resp: 16 SpO2: 96 % O2 Device: Nasal cannula Oxygen Delivery: 4 LPM  Vitals:    07/24/19 0533   Weight: 63.5 kg (140 lb)     Vital Signs with Ranges  Temp:  [97.8  F (36.6  C)-98.5  F (36.9  C)] 98.2  F (36.8  C)  Pulse:  [56-82] 56  Heart Rate:  [47-55] 55  Resp:  [12-16] 16  BP: (115-133)/() 115/75  SpO2:  [96 %-99 %] 96 %  I/O last 3 completed shifts:  In: 772.57 [I.V.:772.57]  Out: -     General: Resting comfortably, alert, conversive, NAD  CVS: HRRR, no MGR, no LE edema  Respiratory: CTAB, no wheeze/rales/rhonchi, no increased work of breathing  GI: S, NT, ND, +BS  Skin: Warm/dry    Discharge Disposition   Discharged to home  Condition at discharge: Stable    Consultations This Hospital Stay   CARDIAC REHAB IP CONSULT  CARDIOLOGY IP CONSULT    Time Spent on this Encounter   IRose, personally saw the patient today and spent greater than 30 minutes discharging this patient.    Discharge Orders      Reason for your hospital stay    Evaluation of your chest pain, for which you underwent an angiogram.     Follow-up and recommended labs and tests     1. Follow up with your PCP in the next week.     Activity    Your activity upon discharge: activity as tolerated     Diet    Follow this diet upon discharge: Cardiac     Discharge Medications   Current Discharge Medication List      CONTINUE these medications which have NOT CHANGED    Details   fish oil-omega-3 fatty acids 1000 MG capsule Take 2 g by mouth 2 times daily as needed      fluticasone (FLONASE) 50 MCG/ACT nasal spray Spray 2 sprays into both nostrils daily as needed for rhinitis or allergies      lisinopril (PRINIVIL/ZESTRIL) 10 MG tablet TAKE 1 TABLET (10  MG) BY MOUTH DAILY  Qty: 90 tablet, Refills: 3    Associated Diagnoses: Benign essential hypertension           Allergies   Allergies   Allergen Reactions     Iodine      Data   Most Recent 3 CBC's:  Recent Labs   Lab Test 07/23/19  1818 07/23/19  1303 10/27/18  0946   WBC 5.5 6.2 6.3   HGB 13.6 13.6 14.5   MCV 88 89 92    326 291      Most Recent 3 BMP's:  Recent Labs   Lab Test 07/24/19  0546 07/23/19  1303 10/27/18  0946   * 125* 135   POTASSIUM 4.1 3.7 4.1   CHLORIDE 96 86* 100   CO2 32 28 24   BUN 9 8 6*   CR 0.52 0.54 0.52   ANIONGAP 3 11 11   KAVEH 8.6 9.4 8.5   GLC 87 89 88     Most Recent 2 LFT's:  Recent Labs   Lab Test 07/23/19  1303 10/27/18  0946   AST 28 24   ALT 20 24   ALKPHOS 142 128   BILITOTAL 1.0 0.5     Most Recent 3 Troponin's:  Recent Labs   Lab Test 07/23/19  2142 07/23/19  1818 07/23/19  1303   TROPI 0.104* 0.108* 0.121*     Most Recent Cholesterol Panel:  Recent Labs   Lab Test 07/24/19  0546   CHOL 218*   *      TRIG 42     Results for orders placed or performed during the hospital encounter of 07/23/19   XR Chest 2 Views    Narrative    XR CHEST 2 VW   7/23/2019 1:15 PM     HISTORY: chest pain    COMPARISON: 7/29/2013      Impression    IMPRESSION: No acute cardiopulmonary disease.    ARNAUD LLOYD MD

## 2019-07-24 NOTE — DISCHARGE INSTRUCTIONS
Cardiac Angiogram Discharge Instructions - Femoral    After you go home:      Have an adult stay with you until tomorrow.    Drink extra fluids for 2 days.    You may resume your normal diet.    No smoking    You can take tylenol as needed for mild pain        For 24 hours - due to the sedation you received:    Relax and take it easy.     Do NOT make any important or legal decisions.    Do NOT drive or operate machines at home or at work.    Do NOT drink alcohol.    Care of Groin Puncture Site:      For the first 24 hrs - check the puncture site every 1-2 hours while awake.    For 2 days, when you cough, sneeze, laugh or move your bowels, hold your hand over the puncture site and press firmly.    Remove the bandaid after 24 hours. If there is minor oozing, apply another bandaid and remove it after 12 hours.    It is normal to have a small bruise or pea size lump at the site.    You may shower tomorrow. Do NOT take a bath, or use a hot tub or pool for at least 3 days. Do NOT scrub the site. Do not use lotion or powder near the puncture site.    Activity:            For 2 days:    No stooping or squatting    Do NOT do any heavy activity such as exercise, lifting, or straining.     No housework, yard work or any activity that make you sweat    Do NOT lift more than 10 pounds    Wait to resume normal exercise for a week    Bleeding:      If you start bleeding from the site in your groin, lie down flat and press firmly on/above the site for 10 minutes.     Once bleeding stops, lay flat for 2 hours.     Call [facility] as soon as you can.       Call 911 right away if you have heavy bleeding or bleeding that does not stop.      Medicines:        Take your medications    If you have stopped any medicines, check with your provider about when to restart them.    Call the clinic if:      You have increased pain or a large or growing hard lump around the site.    The site is red, swollen, hot or tender.    Blood or fluid is  draining from the site.    You have chills or a fever greater than 101 F (38 C).    Your leg feels numb, cool or changes color.    You have hives, a rash or unusual itching.    New pain in the back or belly that you cannot control with Tylenol.    Any questions or concerns.          [facility] [place]:    [phone] UMP (7 days a week)

## 2019-07-24 NOTE — PLAN OF CARE
[id number]: Patient alert and oriented, VSS on room air, tele shows sinus nallely. Up independently, voiding adequately in bathroom, bowel sounds active. Pt denies chest pain this shift. NPO since 0000. CIWA's WNL.Heparin drip running at 6 ml/hr, hep 10a recheck at 0600. Plan for echo today and cariology consult, discharge pending.

## 2019-07-24 NOTE — PLAN OF CARE
Vital signs stable. Tele SR. R groin site WDL, pt off bedrest at 1830 and ambulated in kapoor w/ nursing w/o complications. Medications discussed, Information reviewed, Questions answered, and concerns addressed. Follow-up Instructions reviewed. Pt belongings accounted for and sent home with them. Pt left via wheelchair and driven by .

## 2019-07-24 NOTE — CONSULTS
[facility]    Cardiology Consultation     Date of Admission:  7/23/2019    Assessment & Plan   Rosanna Ivory is a 58 year old female who was admitted on 7/23/2019.    1.  Central chest aching discomfort, consistent with angina.    2.  Abnormal ECG with anteroseptal Q waves and deep T wave inversions, suspicious for ischemia.    3.  Minor troponin elevation suggestive of nontransmural infarction.  I have reviewed her echocardiogram images and see no obvious regional wall motion and normalities.      4.  Recent smoking history, 1 pack/day for 10 years, quit 4 months ago.    5.  Hypertension on medical therapy    6.  Borderline dyslipidemia    Recommendations:    The patient has had a few episodes of mild recurrent chest discomfort since admission and has a fairly typical description for angina with elevated troponins.  I would recommend coronary angiography for further evaluation and possible treatment.     She is on appropriate medical therapy risks and benefits of left heart catheterization and coronary angiogram were discussed with the patient in detail. Risk estimated at 0.1-0.3% for diagnostic angio and 1-2% for PCI, including risk of stroke, MI, death, emergent bypass, contrast induced allergic reaction, renal dysfunction, and vascular complications (including bleeding and transfusion) were discussed. Patient understands and wishes to proceed.    She is on appropriate medical therapy    SHAN HENSON MD    Primary Care Physician   Broderick Siu    Reason for Consult   Reason for consult: I was asked by Dr. Gil of the hospitalist service to evaluate this patient for chest pain, abnormal ECG, and elevated troponin level.    History of Present Illness   Rosanna Ivory is a 58 year old female who presents with about 5 days of intermittent central chest aching discomfort.  She has had acid reflux in the past and this feels somewhat similar, especially with a burning sensation in her throat.  " However, this is occurring intermittently at rest and sometimes with exertion and does not seem to resolve with the usual treatment for acid reflux.  Her episodes initially began when she laid down at night to sleep and resolved but recurred the next day.  She has had intermittent episodes of this discomfort frequently enough that she became concerned and went to the urgent care in [place].  Due to some abnormalities of her ECG she was referred to the emergency room in [place].  Her troponin was noted to be elevated and she was transferred down to [facility] for further evaluation.  Since arrival.  She has had a couple of brief episodes of central chest aching discomfort when she has been up to the bathroom.    In addition, she describes episodes of \"fogginess\" that sound to me like lightheadedness.  She has not had any near syncope or syncope.  When she came to the emergency room her blood pressure was high with a systolic blood pressure above 200 and it has gradually decreased since then with medical therapy.  She felt quite anxious during this evaluation.    She quit smoking in March 2019 after smoking about a pack of cigarettes per day for 10 years.  She has hypertension treated with medical therapy.  Her cholesterol numbers have been borderline and not been treated.  She has no family history of heart disease or diabetes.  Her  recently went underwent four-vessel bypass surgery last year.    Patient Active Problem List   Diagnosis     Allergic rhinitis     External hemorrhoids     External hemorrhoids with other complication     Irregular menstrual bleeding     CARDIOVASCULAR SCREENING; LDL GOAL LESS THAN 160     Benign essential hypertension     Alcohol dependence (H)     Hyperlipidemia     Screening for cardiovascular condition     Vertigo     Cigarette smoker     ACS (acute coronary syndrome) (H)       Past Medical History   I have reviewed this patient's medical history and updated it " with pertinent information if needed.   Past Medical History:   Diagnosis Date     Alcohol abuse 2013    treatment     Allergic rhinitis, cause unspecified     seasonal     ASCUS on Pap smear 7/30/10    HPV + 52 (high risk)     Esophageal reflux      External hemorrhoids without mention of complication      History of colposcopy with cervical biopsy 8/23/10    bx- cervicitis.  no dysplasia noted     Other chest pain     atypical, normal stress testing     Pure hypercholesterolemia     total 220       Past Surgical History   I have reviewed this patient's surgical history and updated it with pertinent information if needed.  Past Surgical History:   Procedure Laterality Date     C  DELIVERY ONLY      , Low Cervical     COLONOSCOPY  02/01/10    with snare polypectomy     HC CARDIAC STRESS TST,COMPLETE      normal per pt     HC HEMORRHOIDECTOMY,INT/EXT,SIMPLE  02/18/10       Prior to Admission Medications   Prior to Admission Medications   Prescriptions Last Dose Informant Patient Reported? Taking?   fish oil-omega-3 fatty acids 1000 MG capsule Past Month at Unknown time  Yes Yes   Sig: Take 2 g by mouth 2 times daily as needed   fluticasone (FLONASE) 50 MCG/ACT nasal spray Past Month at Unknown time  Yes Yes   Sig: Spray 2 sprays into both nostrils daily as needed for rhinitis or allergies   lisinopril (PRINIVIL/ZESTRIL) 10 MG tablet 2019 at Unknown time  No Yes   Sig: TAKE 1 TABLET (10 MG) BY MOUTH DAILY      Facility-Administered Medications: None     Current Facility-Administered Medications   Medication Dose Route Frequency     aspirin  325 mg Oral Daily     atorvastatin  40 mg Oral QPM     folic acid  1 mg Oral Daily     lisinopril  10 mg Oral Daily     metoprolol tartrate  25 mg Oral BID     multivitamin w/minerals  1 tablet Oral Daily     sodium chloride (PF)  3 mL Intracatheter Q8H     vitamin B1  100 mg Oral Daily     Current Facility-Administered Medications   Medication  Last Rate     Continuing ACE inhibitor/ARB/ARNI from home medication list OR ACE inhibitor/ARB order already placed during this visit       - MEDICATION INSTRUCTIONS -       HEParin 600 Units/hr (07/24/19 0912)     - MEDICATION INSTRUCTIONS -       - MEDICATION INSTRUCTIONS -       sodium chloride 75 mL/hr at 07/24/19 0912     Allergies   Allergies   Allergen Reactions     Iodine        Social History    reports that she has been smoking cigarettes.  She has a 3.00 pack-year smoking history. She has never used smokeless tobacco. She reports that she drinks about 15.0 oz of alcohol per week. She reports that she does not use drugs.    Family History   Family History   Problem Relation Age of Onset     Diabetes Maternal Grandmother      Hypertension Maternal Grandmother      Hypertension Father      Asthma Father      Gastrointestinal Disease Father         hiatal hernia     Diabetes Father         adult     Prostate Cancer Paternal Grandfather      Circulatory Paternal Grandfather         anyurism     Obesity Sister      Lipids Sister      Alzheimer Disease Mother      Breast Cancer Maternal Aunt         after menopause     Breast Cancer Paternal Aunt         after menopause     C.A.D. No family hx of        Review of Systems   The comprehensive 10 point Review of Systems is negative other than noted in the HPI or here.     Physical Exam   Vital Signs with Ranges  Temp:  [97.8  F (36.6  C)-98.5  F (36.9  C)] 98.2  F (36.8  C)  Pulse:  [56-92] 56  Heart Rate:  [47-91] 55  Resp:  [11-18] 16  BP: (115-207)/() 115/75  SpO2:  [96 %-100 %] 96 %  Wt Readings from Last 4 Encounters:   07/24/19 63.5 kg (140 lb)   07/23/19 65.8 kg (145 lb)   11/01/18 61.2 kg (135 lb)   10/27/18 60.8 kg (134 lb 1.6 oz)     No intake/output data recorded.      Vitals: /75 (BP Location: Left arm)   Pulse 56   Temp 98.2  F (36.8  C) (Oral)   Resp 16   Wt 63.5 kg (140 lb)   SpO2 96%   BMI 23.66 kg/m      Constitutional: Awake,  alert, cooperative, no apparent distress, and appears stated age.  Eyes: Lids and lashes normal, pupils equal, round and reactive to light, extra ocular muscles intact, sclera clear, conjunctiva normal.  ENT: Normocephalic, without obvious abnormality, atramatic,oral pharynx with moist mucus membranes, gums normal and good dentition.  Neck: Supple, symmetrical, trachea midline, no adenopathy, thyroid symmetric, skin normal.  Lungs: No increased work of breathing, good air exchange, clear to auscultation bilaterally, no crackles or wheezing.  Cardiovascular: Regular rate and rhythm, normal S1 and S2, no S3 or S4, and no murmur noted.  Abdomen: No scars, normal bowel sounds, soft, non-distended, non-tender, no masses palpated, no hepatosplenomegally.  Musculoskeletal: No redness, warmth, or swelling of the joints.  Full range of motion noted.  Motor strength is 5 out of 5 all extremities bilaterally.  Tone is normal.  Neurologic: Awake, alert, oriented to name, place and time.  Cranial nerves II-XII are grossly intact.  Motor is 5 out of 5 bilaterally.  Neuropsychiatric: Normal affect, mood, orientation, memory and insight.  Skin: No rashes, erythema, pallor, petechia or purpura.  No edema. Carotids without bruit.     Recent Labs   Lab 07/23/19 2142 07/23/19 1818 07/23/19  1303   TROPI 0.104* 0.108* 0.121*       Recent Labs   Lab 07/24/19  0546 07/23/19 2142 07/23/19 1818 07/23/19  1303   WBC  --   --  5.5 6.2   HGB  --   --  13.6 13.6   MCV  --   --  88 89   PLT  --   --  333 326   *  --   --  125*   POTASSIUM 4.1  --   --  3.7   CHLORIDE 96  --   --  86*   CO2 32  --   --  28   BUN 9  --   --  8   CR 0.52  --   --  0.54   GFRESTIMATED >90  --   --  >90   GFRESTBLACK >90  --   --  >90   ANIONGAP 3  --   --  11   KAVEH 8.6  --   --  9.4   GLC 87  --   --  89   ALBUMIN  --   --   --  4.0   PROTTOTAL  --   --   --  8.0   BILITOTAL  --   --   --  1.0   ALKPHOS  --   --   --  142   ALT  --   --   --  20   AST  --    --   --  28   LIPASE  --   --   --  138   TROPI  --  0.104* 0.108* 0.121*     Recent Labs   Lab Test 07/24/19  0546 06/13/18  0933   CHOL 218* 242*    106   * 118*   TRIG 42 91     Recent Labs   Lab 07/23/19  1818 07/23/19  1303   WBC 5.5 6.2   HGB 13.6 13.6   HCT 38.7 39.2   MCV 88 89    326     No results for input(s): PH, PHV, PO2, PO2V, SAT, PCO2, PCO2V, HCO3, HCO3V in the last 168 hours.  No results for input(s): NTBNPI, NTBNP in the last 168 hours.  No results for input(s): DD in the last 168 hours.  No results for input(s): SED, CRP in the last 168 hours.  Recent Labs   Lab 07/23/19 1818 07/23/19  1303    326     No results for input(s): TSH in the last 168 hours.  No results for input(s): COLOR, APPEARANCE, URINEGLC, URINEBILI, URINEKETONE, SG, UBLD, URINEPH, PROTEIN, UROBILINOGEN, NITRITE, LEUKEST, RBCU, WBCU in the last 168 hours.    Imaging:  Recent Results (from the past 48 hour(s))   XR Chest 2 Views    Narrative    XR CHEST 2 VW   7/23/2019 1:15 PM     HISTORY: chest pain    COMPARISON: 7/29/2013      Impression    IMPRESSION: No acute cardiopulmonary disease.    ARNAUD LLOYD MD       Echo:  No results found for this or any previous visit (from the past 4320 hour(s)).

## 2019-07-24 NOTE — PLAN OF CARE
AOx4, VSS RA, hep gtt @ 600, Tele: SR, up Ind, NPO at midnight, ECHO tomorrow and Cards consult, continue to monitor

## 2019-07-24 NOTE — H&P
"Admitted:     07/23/2019      CHIEF COMPLAINT:  Chest pain.      HISTORY OBTAINED:  History obtained from the patient, her 2 children, and sister who are present at bedside.      HISTORY OF PRESENT ILLNESS:  Rosanna Ivory is a 58-year-old female with a past medical history significant for hypertension, hyperlipidemia, GERD, alcohol abuse, history of tobacco use who is a direct admission to [facility] from [facility] Emergency Department for further evaluation of a non-ST elevation MI.  The patient reports that 3 days prior to admission in the evening she first noticed a dull substernal/epigastric discomfort that radiated up into her throat.  She reports that at times has felt like a burning sensation similar to prior acid reflux episodes.  She was able to sleep, but noticed a return of the same pain the following morning.  She did take some Tums at home which helped a little.  On the day prior to admission, she did not initially notice the pain when she woke up, but reports that it developed intermittently through the day as she was moving around.  She denied any associated shortness of breath, nausea, diaphoresis.  She did notice that she felt \"foggy or floaty\" a few times over the past few days.  She went to her Pilates class this morning and after completing the class developed a recurrence of this chest discomfort.  She went to urgent care and was given a GI cocktail with some temporary improvement in her pain.  EKG at that time showed some changes compared to her last EKG in 2013.  She was also found to be hypertensive and was ultimately directed to the Emergency Department.  On presentation to the Emergency Department, she was found to have an elevated blood pressure of 207/136.  EKG was obtained and showed T-wave inversions in V1 and V2 with some mild ST elevation in lead V1 as well.  These are new changes from 2013.  Dr. Guido of Cardiology was contacted by ED provider and, per his " note, EKG was reviewed by Cardiology and transfer recommended.  EKG changes were determined to not meet STEMI criteria.  The patient was transferred for further management of non-ST elevation MI.  Prior to transfer, she was started on heparin drip and nitro paste placed.  She was pain-free at the time of transfer.  The patient is presently evaluated in her room in the [facility].  She reports she is symptom-free at this time.  Denies chest pain, shortness of breath, lightheadedness, nausea.  She does report feeling a little bit anxious as her  was admitted here a year ago and ultimately underwent a 4-vessel CABG. She reports that prior to the onset of her symptoms, she had not noticed any decreased exercise tolerance and has had prior similar symptoms of acid reflux but never symptoms that persisted for quite this long.  Notably, she is a current everyday alcohol drinker.      REVIEW OF SYSTEMS:  A 10-point review of systems was conducted and is negative aside from the information in the HPI.      PAST MEDICAL HISTORY:   1.  Hypertension.   2.  Hyperlipidemia.   3.  GERD.   4.  Alcohol abuse.   5.  History of tobacco use.   6.  External hemorrhoids.      PAST SURGICAL HISTORY:    Past Surgical History:   Procedure Laterality Date     C  DELIVERY ONLY      , Low Cervical     COLONOSCOPY  02/01/10    with snare polypectomy     CV HEART CATHETERIZATION WITH POSSIBLE INTERVENTION N/A 2019    Procedure: Heart Catheterization with Possible Intervention;  Surgeon: Radhames Tavarez MD;  Location:  HEART CARDIAC CATH LAB     HC CARDIAC STRESS TST,COMPLETE  2000    normal per pt     HC HEMORRHOIDECTOMY,INT/EXT,SIMPLE  02/18/10           ALLERGIES:  IODINE.      PRIOR TO ADMISSION MEDICATIONS:    Prior to Admission medications    Medication Sig Last Dose Taking? Auth Provider   fish oil-omega-3 fatty acids 1000 MG capsule Take 2 g by mouth 2 times daily as needed Past Month at Unknown time  Yes Unknown, Entered By History   fluticasone (FLONASE) 50 MCG/ACT nasal spray Spray 2 sprays into both nostrils daily as needed for rhinitis or allergies Past Month at Unknown time Yes Unknown, Entered By History   lisinopril (PRINIVIL/ZESTRIL) 10 MG tablet TAKE 1 TABLET (10 MG) BY MOUTH DAILY 7/23/2019 at Unknown time Yes Broderick Siu PA-C            SOCIAL HISTORY:  The patient denies drug use.  She is a former smoker who quit smoking in March after smoking a little less than 10 years, approximately a pack a day.  She is .  She works for an insurance company.  She is a current everyday drinker and reports she drinks 3-6 beers per night during the week and up to as many as 8 on the weekends.  His last drink was the night prior to admission.  She reports it has been a while since she went few days without alcohol but denies any history of withdrawal or seizure.      FAMILY HISTORY:  Her mother's side of the family has had a history of diabetes, and her father had diabetes as well.  No family history of heart disease that she is aware of.      LABORATORY EVALUATION:  CMP is notable for sodium of 125, potassium 3.7, chloride 68, BUN 8, and creatinine 0.58.  LFTs and lipase are within normal limits.  Creatinine is normal at 0.54.  Initial troponin is 0.12 with subsequent troponin 0.10.  White blood cell count is 5.5, hemoglobin 13.6, hematocrit 38.7 and platelets 333.        Chest x-ray is negative for acute pathology.        EKG shows some T-wave inversions in V1 and V2 with some mild ST elevation in V1.      PHYSICAL EXAMINATION:   VITAL SIGNS:  Temperature 97.8, heart rate 82, blood pressure 131/102, respiratory rate 12, oxygen saturation is 98% on room air.   GENERAL:  Alert and oriented female sitting up in bed, appears comfortable, appropriately conversant.   HEENT:  Pupils equal and reactive to light, EOMI.   Mucous membranes are moist.   CARDIOVASCULAR:  Regular rate and rhythm, no murmurs  appreciated.   RESPIRATORY:  Lungs are clear to auscultation bilaterally, no increased work of breathing or wheezing.   GASTROINTESTINAL:  Positive bowel sounds.  Abdomen is soft, nontender to palpation.   SKIN:  Warm and dry.   EXTREMITIES:  Moves all 4 extremities, normal tone.     MUSCULOSKELETAL:  No gross deformity.   NEUROLOGIC:  Cranial nerves II-XII are grossly intact.  No focal deficits.  Speech is clear.  Face symmetric.  She has no tremor.      ASSESSMENT AND PLAN:  Rosanna Ivory is a 58-year-old female with a history of hypertension, hyperlipidemia, gastroesophageal reflux disease, alcohol abuse, tobacco use who is a direct admission to [facility] from [facility] Emergency Department for further management of NSTEMI.   1.  Non-ST segment myocardial infarction.  The patient presents with 3 days of intermittent chest discomfort which she describes as a dull ache.  She reports this initially felt similar to prior episodes of reflux, but she became concerned when it persisted for several days.  She denies associated shortness of breath, nausea, diaphoresis.  She has not noticed any decreased exercise tolerance recently and has no known cardiac history.  She does believe that her discomfort has been triggered by activity the past few days.  Risk factors include hypertension, hyperlipidemia, tobacco use history.  Initial troponin in the Emergency Department 0.1 and remained flat at 0.1 on recheck.  Her EKG showed some changes from the prior EKG in 2013 with T-wave inversions in V1 and V2 and mild ST elevation in V1 as well.  This was reviewed, per ED provider report, by on-call Cardiology who did not feel it met STEMI criteria and recommended transfer to [facility] for a nonurgent consultation and possible coronary angiography.  The patient is symptom-free on admission.   -- Cardiology consulted, appreciate assistance.   -- We will continue heparin drip.   -- Echocardiogram.   -- N.p.o.  at midnight.   -- We will continue prior to admission lisinopril, add metoprolol tartrate 25 mg b.i.d.   -- Daily aspirin.   -- Trend troponin.   -- Nitro paste has been removed, can use p.r.n. sublingual nitro as needed.   -- We will repeat EKG now to make sure there is no progression.   2.  Hypertension.  Continue prior to admission lisinopril with hold parameters.  Will also add metoprolol 25 mg b.i.d.   3.  Dyslipidemia.  She was not on medication prior to admission.  We will add Lipitor 40 mg in the evening and check a lipid panel in the a.m.   4.  Hyponatremia, hypochloremia.  Suspect secondary to dehydration, possibly alcohol use.  Sodium on admission is 125, chloride is 86.   -- We will start normal saline at 75 mL per hour overnight.   -- We will recheck sodium in the morning.   5.  Alcohol abuse.  The patient reports she drinks 3-6 beers nightly during the week and up to 8 beers on the weekends.  She has no known history of withdrawal symptoms or seizure.  Her last drink was the evening prior to admission.  Currently, does not show clinical evidence of withdrawal but is certainly at risk.     -- CIWA vital signs for now, will initiate symptom-based medication protocol if indicated.     -- We will order oral vitamins.      CODE STATUS:  The patient is full code.      The patient was seen and examined with Dr. Shan Dutton who agrees with the above plan.         SHAN DUTTON MD       As dictated by STANLEY FLOWERS PA-C            D: 2019   T: 2019   MT: WT      Name:     GERALDINE BYRD   MRN:      [id number]        Account:      [id number]   :      1960        Admitted:     2019                   Document: [id number]       cc: Broderick Siu PA-C

## 2019-07-24 NOTE — PROGRESS NOTES
[facility]    Hospitalist Progress Note    Assessment & Plan   Rosanna Ivory is a 58 year old female with PMHx of hypertension, hyperlipidemia, GERD, tobacco and alcohol use who was admitted from [facility] on 7/23/2019 for management of an NSTEMI.    NSTEMI  Presented to [facility] with 3 days of intermittent chest discomfort which she described as a dull ache. She reports this initially felt similar to prior episodes of reflux, but she became concerned when it persisted for several days. She denies associated shortness of breath, nausea, diaphoresis. She has not noticed any decreased exercise tolerance recently and has no known cardiac history. She felt her discomfort had been triggered by activity in the preceding days. Risk factors include hypertension, hyperlipidemia, tobacco use history. Initial troponin in the ED was minimally elevated 0.1 and remained flat at 0.1 on recheck. EKG showed some changes from a prior EKG in 2013 with T-wave inversions in V1 and V2 and mild ST elevation in V1. This was reviewed, per ED provider report, by on-call Cardiology who did not feel it met STEMI criteria, recommended transfer to [facility] for a nonurgent consultation and possible coronary angiography. Was chest pain free on admission.    -- serial trops remained flat at 0.1, echo ordered  -- cont heparin gtt  -- conts on lisinopril, metoprolol 25mg BID added on admission  -- started on aspirin 325mg daily and atorvastatin 40mg HS on admission  -- cardiology following, anticipate further evaluation with angiogram today    Hypertension  PTA: lisinopril 10mg daily  -- continued on lisinopril, metoprolol 25mg BID added this stay    Dyslipidemia  Was taking fish oil prior to admission. FLP this stay showed , , t.cholest 218 and TG 42.  -- atorvastatin 40mg HS added this stay    Hyponatremia: Improved  Suspect secondary to dehydration, possibly alcohol use. Na 125 on  presentation, Cl  86. Started IVFs on admission with NS @75ml/h  -- Na trending up: 125 -- 131 today  -- cont gentle IVFs while NPO  -- repeat BMP in AM    Alcohol abuse   Reportedly drinks 3-6 beers nightly during the week and up to 8 beers on the weekends. Has no known history of withdrawal symptoms or seizure. Last drink was the evening prior to admission.  -- initiated on CIWA protocol (w/o meds) on admission -- scores remain low thus far  -- oral MVI, thiamine and folate ordered    FEN: cont NS @75ml/h for now, lytes otherwise stable, NPO pending cardiac evaluation  DVT Prophylaxis: heparin gtt  Code Status: Full Code    Disposition: Discharge pending recommendations per cardiology and finding on workup this stay -- suspect 1-2d.     Rose Gil    Interval History   Seen this morning. Resting comfortably. Endorsing mild ache in her mid chest with ambulating to the bathroom this morning. No dyspnea. No dizziness/lightheadedness. Denies abd pain/n/v. Was hoping to go home today.    -Data reviewed today: I reviewed all new labs and imaging results over the last 24 hours. I personally reviewed no images or EKG's today.    Physical Exam   Temp: 98.2  F (36.8  C) Temp src: Oral BP: 115/75 Pulse: 56 Heart Rate: 55 Resp: 16 SpO2: 96 % O2 Device: None (Room air)    Vitals:    07/24/19 0533   Weight: 63.5 kg (140 lb)     Vital Signs with Ranges  Temp:  [97.8  F (36.6  C)-98.5  F (36.9  C)] 98.2  F (36.8  C)  Pulse:  [56-92] 56  Heart Rate:  [47-91] 55  Resp:  [11-18] 16  BP: (115-207)/() 115/75  SpO2:  [96 %-100 %] 96 %  No intake/output data recorded.    Constitutional: Resting comfortably, alert and answering questions appropriately, NAD  Respiratory: CTAB, no wheeze/rales/rhonchi, no increased work of breathing  Cardiovascular: HRRR, no MGR, no LE edema  GI: S, NT, ND, +BS  Skin/Integumen: warm/dry  Other:      Medications     Continuing ACE inhibitor/ARB/ARNI from home medication list OR ACE  inhibitor/ARB order already placed during this visit       - MEDICATION INSTRUCTIONS -       HEParin 600 Units/hr (07/24/19 0645)     - MEDICATION INSTRUCTIONS -       - MEDICATION INSTRUCTIONS -       sodium chloride 75 mL/hr at 07/24/19 0014       aspirin  325 mg Oral Daily     atorvastatin  40 mg Oral QPM     folic acid  1 mg Oral Daily     lisinopril  10 mg Oral Daily     metoprolol tartrate  25 mg Oral BID     multivitamin w/minerals  1 tablet Oral Daily     sodium chloride (PF)  3 mL Intracatheter Q8H     vitamin B1  100 mg Oral Daily       Data   Recent Labs   Lab 07/24/19  0546 07/23/19  2142 07/23/19  1818 07/23/19  1303   WBC  --   --  5.5 6.2   HGB  --   --  13.6 13.6   MCV  --   --  88 89   PLT  --   --  333 326   *  --   --  125*   POTASSIUM 4.1  --   --  3.7   CHLORIDE 96  --   --  86*   CO2 32  --   --  28   BUN 9  --   --  8   CR 0.52  --   --  0.54   ANIONGAP 3  --   --  11   KAVEH 8.6  --   --  9.4   GLC 87  --   --  89   ALBUMIN  --   --   --  4.0   PROTTOTAL  --   --   --  8.0   BILITOTAL  --   --   --  1.0   ALKPHOS  --   --   --  142   ALT  --   --   --  20   AST  --   --   --  28   LIPASE  --   --   --  138   TROPI  --  0.104* 0.108* 0.121*       Recent Results (from the past 24 hour(s))   XR Chest 2 Views    Narrative    XR CHEST 2 VW   7/23/2019 1:15 PM     HISTORY: chest pain    COMPARISON: 7/29/2013      Impression    IMPRESSION: No acute cardiopulmonary disease.    ARNAUD LLOYD MD

## 2019-07-24 NOTE — PLAN OF CARE
OT/CR: pt admitted due to chest pain and troponin elevated, will hold OT/CR eval until cards consult and POC established.

## 2019-07-25 ENCOUNTER — CARE COORDINATION (OUTPATIENT)
Dept: CARDIOLOGY | Facility: CLINIC | Age: 59
End: 2019-07-25

## 2019-07-25 ENCOUNTER — MYC MEDICAL ADVICE (OUTPATIENT)
Dept: FAMILY MEDICINE | Facility: OTHER | Age: 59
End: 2019-07-25

## 2019-07-25 NOTE — PROGRESS NOTES
Patient was evaluated by cardiology while inpatient for chest pain and coronary angiogram with normal coronary arteries.  Called patient to discuss any post hospital d/c questions, review medication changes, and confirm f/u appts. Patient denied any questions regarding medications. Patient denied any SOB, chest pain, fever or light headedness. Right femoral artery cardiac cath site is without bleeding, swelling, redness or tenderness. Pt did not have questions regarding post procedure instructions. Follow up appt needs to be scheduled. Patient advised to call clinic with any cardiac related questions or concerns prior to this barber't. Patient verbalized understanding and agreed with plan.

## 2019-07-26 LAB — INTERPRETATION ECG - MUSE: NORMAL

## 2019-11-23 DIAGNOSIS — I10 BENIGN ESSENTIAL HYPERTENSION: ICD-10-CM

## 2019-11-25 RX ORDER — LISINOPRIL 10 MG/1
TABLET ORAL
Qty: 30 TABLET | Refills: 0 | Status: SHIPPED | OUTPATIENT
Start: 2019-11-25 | End: 2019-12-24

## 2019-11-25 NOTE — TELEPHONE ENCOUNTER
LM for patient to return phone call to clinic about message below.  Kimberly Villafana CMA ([facility])

## 2019-11-25 NOTE — TELEPHONE ENCOUNTER
Medication is being filled for 1 time refill only due to:  Patient needs to be seen because it has been more than one year since last visit.    Pia Collado, RN, BSN

## 2019-12-08 ENCOUNTER — HEALTH MAINTENANCE LETTER (OUTPATIENT)
Age: 59
End: 2019-12-08

## 2019-12-20 DIAGNOSIS — I10 BENIGN ESSENTIAL HYPERTENSION: ICD-10-CM

## 2019-12-23 DIAGNOSIS — I10 BENIGN ESSENTIAL HYPERTENSION: ICD-10-CM

## 2019-12-24 RX ORDER — LISINOPRIL 10 MG/1
10 TABLET ORAL DAILY
Qty: 15 TABLET | Refills: 0 | Status: SHIPPED | OUTPATIENT
Start: 2019-12-24 | End: 2021-06-02

## 2019-12-24 NOTE — TELEPHONE ENCOUNTER
It has been more than a year since she has been seen and she is due for visit.  She was given a gage refill last month and no visit scheduled yet.  We will give her 15 days.  Please have her schedule an appointment with her PCP.

## 2019-12-24 NOTE — TELEPHONE ENCOUNTER
"Requested Prescriptions   Pending Prescriptions Disp Refills     lisinopril (PRINIVIL/ZESTRIL) 10 MG tablet [Pharmacy Med Name: LISINOPRIL 10 MG TABLET] 30 tablet 0     Sig: TAKE 1 TABLET BY MOUTH EVERY DAY       ACE Inhibitors (Including Combos) Protocol Failed - 12/23/2019  3:23 AM        Failed - Recent (12 mo) or future (30 days) visit within the authorizing provider's specialty     Patient has had an office visit with the authorizing provider or a provider within the authorizing providers department within the previous 12 mos or has a future within next 30 days. See \"Patient Info\" tab in inbasket, or \"Choose Columns\" in Meds & Orders section of the refill encounter.              Passed - Blood pressure under 140/90 in past 12 months     BP Readings from Last 3 Encounters:   07/24/19 119/72   07/23/19 (!) 155/104   11/01/18 126/84                 Passed - Medication is active on med list        Passed - Patient is age 18 or older        Passed - No active pregnancy on record        Passed - Normal serum creatinine on file in past 12 months     Recent Labs   Lab Test 07/24/19  0546   CR 0.52             Passed - Normal serum potassium on file in past 12 months     Recent Labs   Lab Test 07/24/19  0546   POTASSIUM 4.1             Passed - No positive pregnancy test within past 12 months          Routing refill request to provider for review/approval because:  Evita given x1 and patient did not follow up, please advise    Michael Fierro, RN, BSN          "

## 2019-12-24 NOTE — TELEPHONE ENCOUNTER
Left detailed message regarding that medication was filled for the next 15 days. Informed patient that she needs to schedule a follow up appointment in order to have her medication refilled.   Clifton Daly MA  December 24, 2019

## 2019-12-26 RX ORDER — LISINOPRIL 10 MG/1
TABLET ORAL
Qty: 30 TABLET | Refills: 0 | OUTPATIENT
Start: 2019-12-26

## 2019-12-26 NOTE — TELEPHONE ENCOUNTER
JM patient. Evita given and no follow up. Rx denied.     Param Siegel PA-C  [facility]

## 2020-03-15 ENCOUNTER — HEALTH MAINTENANCE LETTER (OUTPATIENT)
Age: 60
End: 2020-03-15

## 2020-04-30 ENCOUNTER — TRANSFERRED RECORDS (OUTPATIENT)
Dept: HEALTH INFORMATION MANAGEMENT | Facility: CLINIC | Age: 60
End: 2020-04-30

## 2020-04-30 LAB
CREAT SERPL-MCNC: 0.68 MG/DL (ref 0.57–1.11)
GFR SERPL CREATININE-BSD FRML MDRD: >60 ML/MIN/1.73M2
GLUCOSE SERPL-MCNC: 82 MG/DL (ref 65–100)
HBA1C MFR BLD: 5.4 % (ref 0–5.7)
INR PPP: 1

## 2020-05-01 LAB
CHOLEST SERPL-MCNC: 192 MG/DL (ref 100–199)
EJECTION FRACTION: NORMAL %
HDLC SERPL-MCNC: 96 MG/DL
LDLC SERPL CALC-MCNC: 87 MG/DL
NONHDLC SERPL-MCNC: 96 MG/DL
POTASSIUM SERPL-SCNC: 4.6 MMOL/L (ref 3.5–5)
TRIGL SERPL-MCNC: 44 MG/DL

## 2020-09-09 ENCOUNTER — TELEPHONE (OUTPATIENT)
Dept: FAMILY MEDICINE | Facility: OTHER | Age: 60
End: 2020-09-09

## 2020-09-09 NOTE — TELEPHONE ENCOUNTER
I have never seen this patient.  She has no showed or cancelled.  In appropriate for me to order.   Cornelia Gutierrez, APRN, CNM

## 2020-09-09 NOTE — TELEPHONE ENCOUNTER
Reason for Call: Request for an order or referral:    Order or referral being requested: colonscopy    Date needed: as soon as possible    Has the patient been seen by the PCP for this problem? NO    Additional comments: She would like to get this done before the end of the year    Phone number Patient can be reached at:  Home number on file [phone] (home)    Best Time:  anytime    Can we leave a detailed message on this number?  YES    Call taken on 9/9/2020 at 10:08 AM by Holegr Kruse

## 2020-09-09 NOTE — TELEPHONE ENCOUNTER
RAJIV saw her last, though I can see on the records she has had MI and stroke without follow-up here with [facility] in last 2 years. Can ask RAJIV to order or direct to update her care here.  Adriana Chao MD

## 2020-10-06 ENCOUNTER — TRANSFERRED RECORDS (OUTPATIENT)
Dept: HEALTH INFORMATION MANAGEMENT | Facility: CLINIC | Age: 60
End: 2020-10-06

## 2020-10-06 LAB
HPV ABSTRACT: NORMAL
PAP-ABSTRACT: NORMAL

## 2020-11-20 DIAGNOSIS — I10 BENIGN ESSENTIAL HYPERTENSION: ICD-10-CM

## 2020-11-21 NOTE — TELEPHONE ENCOUNTER
Pending Prescriptions:                       Disp   Refills    lisinopril (ZESTRIL) 10 MG tablet [Pharmac*30 tab*         Sig: TAKE 1 TABLET BY MOUTH EVERY DAY      Routing refill request to provider for review/approval because:  Evita given x1 and patient did not follow up, please advise  A break in medication    Yecenia Gordillo RN

## 2020-11-23 RX ORDER — LISINOPRIL 10 MG/1
TABLET ORAL
Qty: 30 TABLET | OUTPATIENT
Start: 2020-11-23

## 2020-11-23 NOTE — TELEPHONE ENCOUNTER
Called and spoke with patient regarding message below.  Patient verbalized understanding.  Kimberly Villafana CMA ([facility])

## 2020-11-23 NOTE — TELEPHONE ENCOUNTER
I have not seen patient in 2 years so need an appt for refills. It appears she may be following with Bhavik now.    Broderick Siu PA-C

## 2020-12-07 ENCOUNTER — HOSPITAL ENCOUNTER (OUTPATIENT)
Dept: CARDIOLOGY | Facility: CLINIC | Age: 60
End: 2020-12-07
Attending: INTERNAL MEDICINE
Payer: COMMERCIAL

## 2020-12-07 ENCOUNTER — OFFICE VISIT (OUTPATIENT)
Dept: CARDIOLOGY | Facility: CLINIC | Age: 60
End: 2020-12-07
Payer: COMMERCIAL

## 2020-12-07 VITALS
WEIGHT: 154.8 LBS | HEART RATE: 68 BPM | OXYGEN SATURATION: 100 % | DIASTOLIC BLOOD PRESSURE: 80 MMHG | HEIGHT: 64 IN | SYSTOLIC BLOOD PRESSURE: 114 MMHG | BODY MASS INDEX: 26.43 KG/M2

## 2020-12-07 DIAGNOSIS — Z11.59 ENCOUNTER FOR SCREENING FOR OTHER VIRAL DISEASES: Primary | ICD-10-CM

## 2020-12-07 DIAGNOSIS — Z86.73 HISTORY OF CVA (CEREBROVASCULAR ACCIDENT): ICD-10-CM

## 2020-12-07 DIAGNOSIS — I21.4 NSTEMI (NON-ST ELEVATED MYOCARDIAL INFARCTION) (H): ICD-10-CM

## 2020-12-07 DIAGNOSIS — I21.4 NSTEMI (NON-ST ELEVATED MYOCARDIAL INFARCTION) (H): Primary | ICD-10-CM

## 2020-12-07 PROCEDURE — 93010 ELECTROCARDIOGRAM REPORT: CPT | Performed by: INTERNAL MEDICINE

## 2020-12-07 PROCEDURE — 99215 OFFICE O/P EST HI 40 MIN: CPT | Performed by: INTERNAL MEDICINE

## 2020-12-07 PROCEDURE — 93005 ELECTROCARDIOGRAM TRACING: CPT

## 2020-12-07 PROCEDURE — 0296T LEADLESS EKG MONITOR 3 TO 14 DAYS: CPT

## 2020-12-07 RX ORDER — ATORVASTATIN CALCIUM 40 MG/1
40 TABLET, FILM COATED ORAL DAILY
Qty: 90 TABLET | Refills: 3 | Status: SHIPPED | OUTPATIENT
Start: 2020-12-07 | End: 2021-12-30

## 2020-12-07 RX ORDER — ATORVASTATIN CALCIUM 10 MG/1
40 TABLET, FILM COATED ORAL DAILY
COMMUNITY
End: 2020-12-07

## 2020-12-07 RX ORDER — ASPIRIN 81 MG/1
81 TABLET, CHEWABLE ORAL DAILY
COMMUNITY

## 2020-12-07 ASSESSMENT — MIFFLIN-ST. JEOR: SCORE: 1259.23

## 2020-12-07 NOTE — LETTER
12/7/2020    Broderick Siu PA-C  [address]  [place] [address]    RE: Rosanna Ivory       Dear Colleague,    I had the pleasure of seeing Rosanna Ivory in the [facility].    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    Rosanna presented with several days of more or less continuous chest pain back in 2019 and was found to have a mildly elevated troponin and an abnormal ECG with anterior T wave inversions.  She underwent a coronary angiogram at that time showing normal coronary arteries.  The cause of her non-STEMI was unclear.    In April of this year Rosanna developed some neck pain and went to a chiropractor.  She then began experiencing some numbness in her left arm that lasted a brief while, followed a week later by worsening numbness which was more extensive for which she presented to the emergency room.  She was found to have a thalamic CVA but she was not felt to be a candidate for TPA because of the subacute nature and her prolonged symptoms.  She has had complete resolution of her neurologic symptoms.  A transthoracic echo done during that stay at [facility] showed:    Visually Estimated EF: 60-65%   Normal LV size and systolic function.   Left ventricular wall thickness mildly increased.   Mild left atrial enlargement.   The right ventricle is normal in size and function.   Small-sized (5x4 mm) echodensity noted on the noncoronary cusp of the aortic valve,   cardiac-dependent motion, likely focal area of   sclerosis/calcification rather than neoplasm. If clinically indicated,  may consider   transesophageal echocardiogram for further   evaluation.   Mildly dilated ascending aorta.    Rosanna denies any sense of palpitations, exertional chest arm neck shoulder jaw discomfort, further neurologic symptoms, syncope or near syncope,  PND/orthopnea, peripheral edema, or claudication.  She has made important lifestyle changes including giving up her habit of smoking, exercising more regularly, and a substantial improvement in her diet.  She has no current complaints or concerns.      ASSESSMENT/PLAN:     1.  History of unexplained non-STEMI.  This may be small vessel disease but could also be embolic.  At this point I think aggressive risk factor management is indicated and to that end I have asked her to increase her Lipitor to 40 mg daily and will recheck lipids in a couple of months.  2.  Unexplained thalamic infarct.  A echo with bubble study did not suggest a shunt but it is possible that this was missed.  There was an abnormality of the aortic valve and I think this should be looked at more carefully.  A RAD will be arranged to accomplish these goals.  I will attempt to obtain images from [facility].  A 2-week Zio patch monitor will also be arranged to evaluate for possible atrial fib, and I discussed with her the possibility of her obtaining an apple watch to further monitor for atrial arrhythmias.  3.  Hyperlipidemia.  The patient has been on low-dose atorvastatin for quite some time with reasonably good lipid control but her LDL was still 87 when last checked.  As described above, I think more aggressive cholesterol management is indicated and Lipitor is being increased to 40 mg.    Thank you for inviting me to participate in your patient's care.  Please do not hesitate to call if I can be of further assistance.  We will contact her with the results of the above studies when available.  Further follow-up will depend on the results of the studies.    Chart documentation was completed, in part, with [employer] voice-recognition software. Even though reviewed, some grammatical, spelling, and word errors may remain.       Orders Placed This Encounter   Procedures     Lipid Profile     ALT     EKG 12-LEAD CLINIC READ     Leadless EKG Monitor 3  to 14 Days     RAD Only- Transesophageal Echocardiogram     Orders Placed This Encounter   Medications     aspirin (ASA) 81 MG chewable tablet     Sig: Take 81 mg by mouth daily     DISCONTD: atorvastatin (LIPITOR) 10 MG tablet     Sig: Take 40 mg by mouth daily     atorvastatin (LIPITOR) 40 MG tablet     Sig: Take 1 tablet (40 mg) by mouth daily     Dispense:  90 tablet     Refill:  3     Medications Discontinued During This Encounter   Medication Reason     atorvastatin (LIPITOR) 10 MG tablet Reorder       10 year ASCVD risk: The ASCVD Risk score (Inocente MCCALLUM Jr., et al., 2013) failed to calculate for the following reasons:    The patient has a prior MI or stroke diagnosis    Encounter Diagnoses   Name Primary?     NSTEMI (non-ST elevated myocardial infarction) (H) Yes     History of CVA (cerebrovascular accident)        CURRENT MEDICATIONS:  Current Outpatient Medications   Medication Sig Dispense Refill     aspirin (ASA) 81 MG chewable tablet Take 81 mg by mouth daily       atorvastatin (LIPITOR) 40 MG tablet Take 1 tablet (40 mg) by mouth daily 90 tablet 3     fluticasone (FLONASE) 50 MCG/ACT nasal spray Spray 2 sprays into both nostrils daily as needed for rhinitis or allergies       lisinopril (PRINIVIL/ZESTRIL) 10 MG tablet Take 1 tablet (10 mg) by mouth daily Overdue for appointment 15 tablet 0     fish oil-omega-3 fatty acids 1000 MG capsule Take 2 g by mouth 2 times daily as needed         ALLERGIES     Allergies   Allergen Reactions     Iodine        PAST MEDICAL HISTORY:  Past Medical History:   Diagnosis Date     Alcohol abuse 2013    treatment     Allergic rhinitis, cause unspecified 2000    seasonal     ASCUS on Pap smear 7/30/10    HPV + 52 (high risk)     Esophageal reflux      External hemorrhoids without mention of complication      History of colposcopy with cervical biopsy 8/23/10    bx- cervicitis.  no dysplasia noted     Other chest pain 2000    atypical, normal stress testing     Pure  hypercholesterolemia     total 220       PAST SURGICAL HISTORY:  Past Surgical History:   Procedure Laterality Date     C  DELIVERY ONLY      , Low Cervical     COLONOSCOPY  02/01/10    with snare polypectomy     CV HEART CATHETERIZATION WITH POSSIBLE INTERVENTION N/A 2019    Procedure: Heart Catheterization with Possible Intervention;  Surgeon: Radhames Tavarez MD;  Location:  HEART CARDIAC CATH LAB     HC CARDIAC STRESS TST,COMPLETE      normal per pt     HC HEMORRHOIDECTOMY,INT/EXT,SIMPLE  02/18/10       FAMILY HISTORY:  Family History   Problem Relation Age of Onset     Diabetes Maternal Grandmother      Hypertension Maternal Grandmother      Hypertension Father      Asthma Father      Gastrointestinal Disease Father         hiatal hernia     Diabetes Father         adult     Prostate Cancer Paternal Grandfather      Circulatory Paternal Grandfather         anyurism     Obesity Sister      Lipids Sister      Alzheimer Disease Mother      Breast Cancer Maternal Aunt         after menopause     Breast Cancer Paternal Aunt         after menopause     C.A.D. No family hx of        SOCIAL HISTORY:  Social History     Socioeconomic History     Marital status:      Spouse name: Harley     Number of children: Not on file     Years of education: Not on file     Highest education level: Not on file   Occupational History     Occupation: [employer]     Employer: [employer]OMEM[employer]   Social Needs     Financial resource strain: Not on file     Food insecurity     Worry: Not on file     Inability: Not on file     Transportation needs     Medical: Not on file     Non-medical: Not on file   Tobacco Use     Smoking status: Current Every Day Smoker     Packs/day: 0.50     Years: 6.00     Pack years: 3.00     Types: Cigarettes     Smokeless tobacco: Never Used   Substance and Sexual Activity     Alcohol use: Yes     Alcohol/week: 25.0 standard drinks     Types: 30 Cans of beer per week     Drug  "use: No     Sexual activity: Yes     Partners: Male     Birth control/protection: Surgical, Pill     Comment: vas   Lifestyle     Physical activity     Days per week: Not on file     Minutes per session: Not on file     Stress: Not on file   Relationships     Social connections     Talks on phone: Not on file     Gets together: Not on file     Attends Congregational service: Not on file     Active member of club or organization: Not on file     Attends meetings of clubs or organizations: Not on file     Relationship status: Not on file     Intimate partner violence     Fear of current or ex partner: Not on file     Emotionally abused: Not on file     Physically abused: Not on file     Forced sexual activity: Not on file   Other Topics Concern     Parent/sibling w/ CABG, MI or angioplasty before 65F 55M? Not Asked   Social History Narrative     Not on file       Review of Systems:  Skin:  Negative     Eyes:  Negative    ENT:  Negative    Respiratory:  Negative    Cardiovascular:  Negative for;palpitations;chest pain;edema;lightheadedness;dizziness    Gastroenterology: Negative    Genitourinary:  Negative    Musculoskeletal:  Negative    Neurologic:  Negative    Psychiatric:  Negative    Heme/Lymph/Imm:  Positive for allergies  Endocrine:  Negative      Physical Exam:  Vitals: /80 (BP Location: Right arm, Patient Position: Fowlers, Cuff Size: Adult Regular)   Pulse 68   Ht 1.613 m (5' 3.5\")   Wt 70.2 kg (154 lb 12.8 oz)   SpO2 100%   BMI 26.99 kg/m      Constitutional:  cooperative, alert and oriented, well developed, well nourished, in no acute distress        Skin:  warm and dry to the touch        Head:  normocephalic        Eyes:  sclera white;no xanthalasma;no nystagmus        ENT:  no pallor or cyanosis        Neck:  carotid pulses are full and equal bilaterally, JVP normal, no carotid bruit        Chest:  normal breath sounds, clear to auscultation, normal A-P diameter, normal symmetry, normal respiratory " excursion, no use of accessory muscles        Cardiac: regular rhythm, normal S1/S2, no S3 or S4, apical impulse not displaced, no murmurs, gallops or rubs                  Abdomen:  abdomen soft;BS normoactive        Vascular: pulses full and equal                                      Extremities and Back:           Neurological:  no gross motor deficits          Recent Lab Results:  LIPID RESULTS:  Lab Results   Component Value Date    CHOL 192 05/01/2020    HDL 96 05/01/2020    LDL 87 05/01/2020    TRIG 44 05/01/2020    CHOLHDLRATIO 2.0 07/30/2010       LIVER ENZYME RESULTS:  Lab Results   Component Value Date    AST 28 07/23/2019    ALT 20 07/23/2019       CBC RESULTS:  Lab Results   Component Value Date    WBC 5.5 07/23/2019    RBC 4.42 07/23/2019    HGB 13.6 07/23/2019    HCT 38.7 07/23/2019    MCV 88 07/23/2019    MCH 30.8 07/23/2019    MCHC 35.1 07/23/2019    RDW 11.4 07/23/2019     07/23/2019       BMP RESULTS:  Lab Results   Component Value Date     (L) 07/24/2019    POTASSIUM 4.6 05/01/2020    CHLORIDE 96 07/24/2019    CO2 32 07/24/2019    ANIONGAP 3 07/24/2019    GLC 82 04/30/2020    BUN 9 07/24/2019    CR 0.68 04/30/2020    GFRESTIMATED >60 04/30/2020    GFRESTBLACK >60 04/30/2020    KAVEH 8.6 07/24/2019        A1C RESULTS:  Lab Results   Component Value Date    A1C 5.4 04/30/2020       INR RESULTS:  Lab Results   Component Value Date    INR 1.0 04/30/2020    INR 0.90 05/16/2013       Thank you for allowing me to participate in the care of your patient.    Sincerely,     Brad Pratt MD     [facility]

## 2020-12-07 NOTE — NURSING NOTE
Faxed GENA to [facility] requesting a CD with echocardiogram images be sent to Dr. Pratt at [facility].

## 2020-12-07 NOTE — PROGRESS NOTES
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    Rosanna presented with several days of more or less continuous chest pain back in 2019 and was found to have a mildly elevated troponin and an abnormal ECG with anterior T wave inversions.  She underwent a coronary angiogram at that time showing normal coronary arteries.  The cause of her non-STEMI was unclear.    In April of this year Rosanna developed some neck pain and went to a chiropractor.  She then began experiencing some numbness in her left arm that lasted a brief while, followed a week later by worsening numbness which was more extensive for which she presented to the emergency room.  She was found to have a thalamic CVA but she was not felt to be a candidate for TPA because of the subacute nature and her prolonged symptoms.  She has had complete resolution of her neurologic symptoms.  A transthoracic echo done during that stay at [facility] showed:    Visually Estimated EF: 60-65%   Normal LV size and systolic function.   Left ventricular wall thickness mildly increased.   Mild left atrial enlargement.   The right ventricle is normal in size and function.   Small-sized (5x4 mm) echodensity noted on the noncoronary cusp of the aortic valve,   cardiac-dependent motion, likely focal area of   sclerosis/calcification rather than neoplasm. If clinically indicated,  may consider   transesophageal echocardiogram for further   evaluation.   Mildly dilated ascending aorta.    Rosanna denies any sense of palpitations, exertional chest arm neck shoulder jaw discomfort, further neurologic symptoms, syncope or near syncope, PND/orthopnea, peripheral edema, or claudication.  She has made important lifestyle changes including giving up her habit of smoking, exercising more regularly, and a substantial improvement in her diet.  She has no current complaints  or concerns.      ASSESSMENT/PLAN:     1.  History of unexplained non-STEMI.  This may be small vessel disease but could also be embolic.  At this point I think aggressive risk factor management is indicated and to that end I have asked her to increase her Lipitor to 40 mg daily and will recheck lipids in a couple of months.  2.  Unexplained thalamic infarct.  A echo with bubble study did not suggest a shunt but it is possible that this was missed.  There was an abnormality of the aortic valve and I think this should be looked at more carefully.  A RAD will be arranged to accomplish these goals.  I will attempt to obtain images from [facility].  A 2-week Zio patch monitor will also be arranged to evaluate for possible atrial fib, and I discussed with her the possibility of her obtaining an apple watch to further monitor for atrial arrhythmias.  3.  Hyperlipidemia.  The patient has been on low-dose atorvastatin for quite some time with reasonably good lipid control but her LDL was still 87 when last checked.  As described above, I think more aggressive cholesterol management is indicated and Lipitor is being increased to 40 mg.    Thank you for inviting me to participate in your patient's care.  Please do not hesitate to call if I can be of further assistance.  We will contact her with the results of the above studies when available.  Further follow-up will depend on the results of the studies.    Chart documentation was completed, in part, with [employer] voice-recognition software. Even though reviewed, some grammatical, spelling, and word errors may remain.       Orders Placed This Encounter   Procedures     Lipid Profile     ALT     EKG 12-LEAD CLINIC READ     Leadless EKG Monitor 3 to 14 Days     RAD Only- Transesophageal Echocardiogram     Orders Placed This Encounter   Medications     aspirin (ASA) 81 MG chewable tablet     Sig: Take 81 mg by mouth daily     DISCONTD: atorvastatin (LIPITOR) 10 MG tablet      Sig: Take 40 mg by mouth daily     atorvastatin (LIPITOR) 40 MG tablet     Sig: Take 1 tablet (40 mg) by mouth daily     Dispense:  90 tablet     Refill:  3     Medications Discontinued During This Encounter   Medication Reason     atorvastatin (LIPITOR) 10 MG tablet Reorder       10 year ASCVD risk: The ASCVD Risk score (Inocente MCCALLUM Jr., et al., 2013) failed to calculate for the following reasons:    The patient has a prior MI or stroke diagnosis    Encounter Diagnoses   Name Primary?     NSTEMI (non-ST elevated myocardial infarction) (H) Yes     History of CVA (cerebrovascular accident)        CURRENT MEDICATIONS:  Current Outpatient Medications   Medication Sig Dispense Refill     aspirin (ASA) 81 MG chewable tablet Take 81 mg by mouth daily       atorvastatin (LIPITOR) 40 MG tablet Take 1 tablet (40 mg) by mouth daily 90 tablet 3     fluticasone (FLONASE) 50 MCG/ACT nasal spray Spray 2 sprays into both nostrils daily as needed for rhinitis or allergies       lisinopril (PRINIVIL/ZESTRIL) 10 MG tablet Take 1 tablet (10 mg) by mouth daily Overdue for appointment 15 tablet 0     fish oil-omega-3 fatty acids 1000 MG capsule Take 2 g by mouth 2 times daily as needed         ALLERGIES     Allergies   Allergen Reactions     Iodine        PAST MEDICAL HISTORY:  Past Medical History:   Diagnosis Date     Alcohol abuse 2013    treatment     Allergic rhinitis, cause unspecified     seasonal     ASCUS on Pap smear 7/30/10    HPV + 52 (high risk)     Esophageal reflux      External hemorrhoids without mention of complication      History of colposcopy with cervical biopsy 8/23/10    bx- cervicitis.  no dysplasia noted     Other chest pain     atypical, normal stress testing     Pure hypercholesterolemia     total 220       PAST SURGICAL HISTORY:  Past Surgical History:   Procedure Laterality Date     C  DELIVERY ONLY      , Low Cervical     COLONOSCOPY  02/01/10    with snare polypectomy     CV HEART  CATHETERIZATION WITH POSSIBLE INTERVENTION N/A 7/24/2019    Procedure: Heart Catheterization with Possible Intervention;  Surgeon: Radhames Tavarez MD;  Location:  HEART CARDIAC CATH LAB     HC CARDIAC STRESS TST,COMPLETE  2000    normal per pt     HC HEMORRHOIDECTOMY,INT/EXT,SIMPLE  02/18/10       FAMILY HISTORY:  Family History   Problem Relation Age of Onset     Diabetes Maternal Grandmother      Hypertension Maternal Grandmother      Hypertension Father      Asthma Father      Gastrointestinal Disease Father         hiatal hernia     Diabetes Father         adult     Prostate Cancer Paternal Grandfather      Circulatory Paternal Grandfather         anyurism     Obesity Sister      Lipids Sister      Alzheimer Disease Mother      Breast Cancer Maternal Aunt         after menopause     Breast Cancer Paternal Aunt         after menopause     C.A.D. No family hx of        SOCIAL HISTORY:  Social History     Socioeconomic History     Marital status:      Spouse name: Harley     Number of children: Not on file     Years of education: Not on file     Highest education level: Not on file   Occupational History     Occupation: [employer]     Employer: [employer]OMEM[employer]   Social Needs     Financial resource strain: Not on file     Food insecurity     Worry: Not on file     Inability: Not on file     Transportation needs     Medical: Not on file     Non-medical: Not on file   Tobacco Use     Smoking status: Current Every Day Smoker     Packs/day: 0.50     Years: 6.00     Pack years: 3.00     Types: Cigarettes     Smokeless tobacco: Never Used   Substance and Sexual Activity     Alcohol use: Yes     Alcohol/week: 25.0 standard drinks     Types: 30 Cans of beer per week     Drug use: No     Sexual activity: Yes     Partners: Male     Birth control/protection: Surgical, Pill     Comment: vas   Lifestyle     Physical activity     Days per week: Not on file     Minutes per session: Not on file     Stress: Not on file  "  Relationships     Social connections     Talks on phone: Not on file     Gets together: Not on file     Attends Buddhist service: Not on file     Active member of club or organization: Not on file     Attends meetings of clubs or organizations: Not on file     Relationship status: Not on file     Intimate partner violence     Fear of current or ex partner: Not on file     Emotionally abused: Not on file     Physically abused: Not on file     Forced sexual activity: Not on file   Other Topics Concern     Parent/sibling w/ CABG, MI or angioplasty before 65F 55M? Not Asked   Social History Narrative     Not on file       Review of Systems:  Skin:  Negative     Eyes:  Negative    ENT:  Negative    Respiratory:  Negative    Cardiovascular:  Negative for;palpitations;chest pain;edema;lightheadedness;dizziness    Gastroenterology: Negative    Genitourinary:  Negative    Musculoskeletal:  Negative    Neurologic:  Negative    Psychiatric:  Negative    Heme/Lymph/Imm:  Positive for allergies  Endocrine:  Negative      Physical Exam:  Vitals: /80 (BP Location: Right arm, Patient Position: Fowlers, Cuff Size: Adult Regular)   Pulse 68   Ht 1.613 m (5' 3.5\")   Wt 70.2 kg (154 lb 12.8 oz)   SpO2 100%   BMI 26.99 kg/m      Constitutional:  cooperative, alert and oriented, well developed, well nourished, in no acute distress        Skin:  warm and dry to the touch        Head:  normocephalic        Eyes:  sclera white;no xanthalasma;no nystagmus        ENT:  no pallor or cyanosis        Neck:  carotid pulses are full and equal bilaterally, JVP normal, no carotid bruit        Chest:  normal breath sounds, clear to auscultation, normal A-P diameter, normal symmetry, normal respiratory excursion, no use of accessory muscles        Cardiac: regular rhythm, normal S1/S2, no S3 or S4, apical impulse not displaced, no murmurs, gallops or rubs                  Abdomen:  abdomen soft;BS normoactive        Vascular: pulses full " and equal                                      Extremities and Back:           Neurological:  no gross motor deficits          Recent Lab Results:  LIPID RESULTS:  Lab Results   Component Value Date    CHOL 192 05/01/2020    HDL 96 05/01/2020    LDL 87 05/01/2020    TRIG 44 05/01/2020    CHOLHDLRATIO 2.0 07/30/2010       LIVER ENZYME RESULTS:  Lab Results   Component Value Date    AST 28 07/23/2019    ALT 20 07/23/2019       CBC RESULTS:  Lab Results   Component Value Date    WBC 5.5 07/23/2019    RBC 4.42 07/23/2019    HGB 13.6 07/23/2019    HCT 38.7 07/23/2019    MCV 88 07/23/2019    MCH 30.8 07/23/2019    MCHC 35.1 07/23/2019    RDW 11.4 07/23/2019     07/23/2019       BMP RESULTS:  Lab Results   Component Value Date     (L) 07/24/2019    POTASSIUM 4.6 05/01/2020    CHLORIDE 96 07/24/2019    CO2 32 07/24/2019    ANIONGAP 3 07/24/2019    GLC 82 04/30/2020    BUN 9 07/24/2019    CR 0.68 04/30/2020    GFRESTIMATED >60 04/30/2020    GFRESTBLACK >60 04/30/2020    KAVEH 8.6 07/24/2019        A1C RESULTS:  Lab Results   Component Value Date    A1C 5.4 04/30/2020       INR RESULTS:  Lab Results   Component Value Date    INR 1.0 04/30/2020    INR 0.90 05/16/2013         Brad Pratt MD, [facility]    CC  No referring provider defined for this encounter.

## 2020-12-11 DIAGNOSIS — Z11.59 ENCOUNTER FOR SCREENING FOR OTHER VIRAL DISEASES: ICD-10-CM

## 2020-12-11 LAB
SARS-COV-2 RNA SPEC QL NAA+PROBE: NORMAL
SPECIMEN SOURCE: NORMAL

## 2020-12-11 PROCEDURE — U0003 INFECTIOUS AGENT DETECTION BY NUCLEIC ACID (DNA OR RNA); SEVERE ACUTE RESPIRATORY SYNDROME CORONAVIRUS 2 (SARS-COV-2) (CORONAVIRUS DISEASE [COVID-19]), AMPLIFIED PROBE TECHNIQUE, MAKING USE OF HIGH THROUGHPUT TECHNOLOGIES AS DESCRIBED BY CMS-2020-01-R: HCPCS | Performed by: INTERNAL MEDICINE

## 2020-12-12 LAB
LABORATORY COMMENT REPORT: NORMAL
SARS-COV-2 RNA SPEC QL NAA+PROBE: NEGATIVE
SPECIMEN SOURCE: NORMAL

## 2020-12-15 ENCOUNTER — HOSPITAL ENCOUNTER (OUTPATIENT)
Dept: CARDIOLOGY | Facility: CLINIC | Age: 60
End: 2020-12-15
Attending: INTERNAL MEDICINE | Admitting: INTERNAL MEDICINE
Payer: COMMERCIAL

## 2020-12-15 ENCOUNTER — HOSPITAL ENCOUNTER (OUTPATIENT)
Facility: CLINIC | Age: 60
Discharge: HOME | End: 2020-12-15
Attending: INTERNAL MEDICINE | Admitting: INTERNAL MEDICINE
Payer: COMMERCIAL

## 2020-12-15 VITALS
WEIGHT: 155.1 LBS | SYSTOLIC BLOOD PRESSURE: 125 MMHG | DIASTOLIC BLOOD PRESSURE: 89 MMHG | TEMPERATURE: 97.8 F | OXYGEN SATURATION: 97 % | HEART RATE: 58 BPM | RESPIRATION RATE: 16 BRPM | HEIGHT: 64 IN | BODY MASS INDEX: 26.48 KG/M2

## 2020-12-15 DIAGNOSIS — Z86.73 HISTORY OF CVA (CEREBROVASCULAR ACCIDENT): ICD-10-CM

## 2020-12-15 PROCEDURE — 250N000009 HC RX 250: Performed by: INTERNAL MEDICINE

## 2020-12-15 PROCEDURE — 258N000003 HC RX IP 258 OP 636: Performed by: INTERNAL MEDICINE

## 2020-12-15 PROCEDURE — 93312 ECHO TRANSESOPHAGEAL: CPT | Mod: 26 | Performed by: INTERNAL MEDICINE

## 2020-12-15 PROCEDURE — 93325 DOPPLER ECHO COLOR FLOW MAPG: CPT | Mod: 26 | Performed by: INTERNAL MEDICINE

## 2020-12-15 PROCEDURE — 93320 DOPPLER ECHO COMPLETE: CPT | Mod: 26 | Performed by: INTERNAL MEDICINE

## 2020-12-15 PROCEDURE — 250N000011 HC RX IP 250 OP 636: Performed by: INTERNAL MEDICINE

## 2020-12-15 PROCEDURE — 93325 DOPPLER ECHO COLOR FLOW MAPG: CPT

## 2020-12-15 PROCEDURE — 999N000183 HC STATISTIC TEE INCLUDES SEDATION

## 2020-12-15 RX ORDER — NALOXONE HYDROCHLORIDE 0.4 MG/ML
0.4 INJECTION, SOLUTION INTRAMUSCULAR; INTRAVENOUS; SUBCUTANEOUS
Status: DISCONTINUED | OUTPATIENT
Start: 2020-12-15 | End: 2020-12-15 | Stop reason: HOSPADM

## 2020-12-15 RX ORDER — SODIUM CHLORIDE 9 MG/ML
INJECTION, SOLUTION INTRAVENOUS CONTINUOUS PRN
Status: DISCONTINUED | OUTPATIENT
Start: 2020-12-15 | End: 2020-12-15 | Stop reason: HOSPADM

## 2020-12-15 RX ORDER — FLUMAZENIL 0.1 MG/ML
0.2 INJECTION, SOLUTION INTRAVENOUS
Status: DISCONTINUED | OUTPATIENT
Start: 2020-12-15 | End: 2020-12-15 | Stop reason: HOSPADM

## 2020-12-15 RX ORDER — DEXTROSE MONOHYDRATE 25 G/50ML
9.5 INJECTION, SOLUTION INTRAVENOUS
Status: DISCONTINUED | OUTPATIENT
Start: 2020-12-15 | End: 2020-12-15 | Stop reason: HOSPADM

## 2020-12-15 RX ORDER — LIDOCAINE 50 MG/G
OINTMENT TOPICAL ONCE
Status: COMPLETED | OUTPATIENT
Start: 2020-12-15 | End: 2020-12-15

## 2020-12-15 RX ORDER — LIDOCAINE HYDROCHLORIDE 40 MG/ML
1.5 SOLUTION TOPICAL ONCE
Status: COMPLETED | OUTPATIENT
Start: 2020-12-15 | End: 2020-12-15

## 2020-12-15 RX ORDER — NALOXONE HYDROCHLORIDE 0.4 MG/ML
0.2 INJECTION, SOLUTION INTRAMUSCULAR; INTRAVENOUS; SUBCUTANEOUS
Status: DISCONTINUED | OUTPATIENT
Start: 2020-12-15 | End: 2020-12-15 | Stop reason: HOSPADM

## 2020-12-15 RX ORDER — GLYCOPYRROLATE 0.2 MG/ML
0.1 INJECTION, SOLUTION INTRAMUSCULAR; INTRAVENOUS ONCE
Status: COMPLETED | OUTPATIENT
Start: 2020-12-15 | End: 2020-12-15

## 2020-12-15 RX ORDER — FENTANYL CITRATE 50 UG/ML
50 INJECTION, SOLUTION INTRAMUSCULAR; INTRAVENOUS ONCE
Status: COMPLETED | OUTPATIENT
Start: 2020-12-15 | End: 2020-12-15

## 2020-12-15 RX ORDER — LIDOCAINE 40 MG/G
CREAM TOPICAL
Status: DISCONTINUED | OUTPATIENT
Start: 2020-12-15 | End: 2020-12-15 | Stop reason: HOSPADM

## 2020-12-15 RX ORDER — FENTANYL CITRATE 50 UG/ML
25 INJECTION, SOLUTION INTRAMUSCULAR; INTRAVENOUS
Status: DISCONTINUED | OUTPATIENT
Start: 2020-12-15 | End: 2020-12-15 | Stop reason: HOSPADM

## 2020-12-15 RX ADMIN — FENTANYL CITRATE 50 MCG: 50 INJECTION INTRAMUSCULAR; INTRAVENOUS at 13:45

## 2020-12-15 RX ADMIN — FENTANYL CITRATE 25 MCG: 50 INJECTION INTRAMUSCULAR; INTRAVENOUS at 13:41

## 2020-12-15 RX ADMIN — TOPICAL ANESTHETIC 1 ML: 200 SPRAY DENTAL; PERIODONTAL at 13:35

## 2020-12-15 RX ADMIN — SODIUM CHLORIDE: 9 INJECTION, SOLUTION INTRAVENOUS at 12:41

## 2020-12-15 RX ADMIN — LIDOCAINE HYDROCHLORIDE 1.5 ML: 40 SOLUTION TOPICAL at 13:13

## 2020-12-15 RX ADMIN — MIDAZOLAM 1 MG: 1 INJECTION INTRAMUSCULAR; INTRAVENOUS at 13:39

## 2020-12-15 RX ADMIN — GLYCOPYRROLATE 0.1 MG: 0.2 INJECTION, SOLUTION INTRAMUSCULAR; INTRAVENOUS at 13:11

## 2020-12-15 RX ADMIN — MIDAZOLAM 1 MG: 1 INJECTION INTRAMUSCULAR; INTRAVENOUS at 13:46

## 2020-12-15 RX ADMIN — MIDAZOLAM 1 MG: 1 INJECTION INTRAMUSCULAR; INTRAVENOUS at 13:45

## 2020-12-15 ASSESSMENT — MIFFLIN-ST. JEOR: SCORE: 1268.53

## 2020-12-15 NOTE — DISCHARGE INSTRUCTIONS
RAD  (Transesophageal Echocardiogram)  Discharge Instructions    After you go home:      Have an adult stay with you for 6 hours.       For 24 hours - due to the sedation you received:    Relax and take it easy.    Do NOT make any important or legal decisions.    Do NOT drive or operate machines at home or at work.    Do NOT drink alcohol.    Diet:    You may resume your normal diet, but no scratchy foods for two days.    If your throat is sore, eat cold, bland or soft foods.    You may have heartburn if the tube used in the exam entered your stomach.  If so:   - Do not eat acidic and spicy foods.   - Do not eat three hours before bedtime. Clear liquids are okay.   - When lying down, use two pillows to raise your head.    Medicines:      Take your medications, including blood thinners, unless your provider tells you not to.    If you have stopped any medicines, check with your provider about when to restart them.    You may take Tylenol (Acetaminophen) if your throat is sore.    You may take antacids if you have heartburn.      Follow Up Appointments:      Follow up with your cardiologist at [facility] of patient preference as instructed.    Follow up with your primary care provider as needed.    Call the clinic if:      You have heartburn that is severe or lasts more than 72 hours.    You have a sore throat that feels worse after 72 hours.    You have shortness of breath, neck pain, chest pain, fever, chills, coughing up blood, or other unusual signs.    Questions or concerns      [facility] [place]:    [phone] [facility] (7 days a week)

## 2020-12-15 NOTE — PROGRESS NOTES
Care Suites Admission Nursing Note    Patient Information  Name: Rosanna Ivory  Age: 60 year old  Reason for admission: RAD  Care Suites arrival time: 1215    Patient Admission/Assessment   Pre-procedure assessment complete: Yes  If abnormal assessment/labs, provider notified: N/A  NPO: Yes  Medications held per instructions/orders: N/A  Consent: deferred  If applicable, pregnancy test status: deferred  Patient oriented to room: Yes  Education/questions answered: Yes  Plan/other: Reviewed plan for today questions answered. Reviewed discharge instructions, pt accepts and understands    Discharge Planning  Discharge name/phone number:See flow sheet  Overnight post sedation caregiver: Harley  Discharge location: home    Ciera Gurrola RN

## 2020-12-15 NOTE — PROGRESS NOTES
Care Suites Post Procedure Note    Patient Information  Name: Rosanna Ivory  Age: 60 year old    Post Procedure  Time patient returned to Care Suites: NA  Concerns/abnormal assessment: NA  If abnormal assessment, provider notified: No  Plan/Other: Awaken from sedation and discharge.    Ciera Gurrola RN

## 2020-12-15 NOTE — PROGRESS NOTES
Care Suites Discharge Nursing Note    Patient Information  Name: Rosanna Ivory  Age: 60 year old    Discharge Education:  Discharge instructions reviewed: Yes  Additional education/resources provided: NA  Patient/patient representative verbalizes understanding: Yes  Patient discharging on new medications: No  Medication education completed: N/A    Discharge Plans:   Discharge location: home  Discharge ride contacted: Yes  Approximate discharge time: 1445    Discharge Criteria:  Discharge criteria met and vital signs stable: Yes    Patient Belongs:  Patient belongings returned to patient: Yes    Ciera Gurrola RN

## 2020-12-15 NOTE — PRE-PROCEDURE
GENERAL PRE-PROCEDURE:   Procedure:  Gian; cva  Date/Time:  12/15/2020 1:37 PM    Verbal consent obtained?: Yes    Written consent obtained?: Yes    Risks and benefits: Risks, benefits and alternatives were discussed    Consent given by:  Patient  Patient states understanding of procedure being performed: Yes    Patient's understanding of procedure matches consent: Yes    Procedure consent matches procedure scheduled: Yes    Appropriately NPO:  Yes  ASA Class:  Class 1- healthy patient  Mallampati  :  Grade 2- soft palate, base of uvula, tonsillar pillars, and portion of posterior pharyngeal wall visible  Lungs:  Lungs clear with good breath sounds bilaterally  Heart:  Normal heart sounds and rate  History & Physical reviewed:  History and physical reviewed and no updates needed  Statement of review:  I have reviewed the lab findings, diagnostic data, medications, and the plan for sedation

## 2020-12-21 ENCOUNTER — TELEPHONE (OUTPATIENT)
Dept: CARDIOLOGY | Facility: CLINIC | Age: 60
End: 2020-12-21

## 2020-12-21 NOTE — TELEPHONE ENCOUNTER
RAD completed on 12-15-20    See messages below                  Zio Patch results are still pending.  Will call patient to review RAD results.     CORY Ledbetter RN     [facility] [place], [place]  December 21, 2020 (12:07 pm)     ----------------------------------------------------------------------  Addendum    Called patient. Left a voicemail message. Awaiting call back.     CORY Ledbetter RN  [facility]  December 22, 2020 (1113 am)      --------------------------------------------------------------------------------------------  Addendum     Patient returned my call. Reviewed RAD results and Dr. Pratt's recommendations. Patient mailed her Zio back last Saturday. Patient was informed that we will call her with the results.     CORY Ledbetter RN     [place], [place]  December 22, 2020 ([id number])     --------------------------------------------------------------------------------------------------------------  Addendum    Zio Patch completed          No Afib. SVT noted.   Messaged Dr. Pratt to review.     CORY Ledbetter RN     [facility] [place], [place]  December 31, 2020 (3900)

## 2021-01-01 NOTE — TELEPHONE ENCOUNTER
History of CVA and NSTEMI, question of aortic valve abnormality  RAD: nl AV, no cardiac source of emboli  Monitor: no afib to explain cardiac source of emboli  We cannot rule out previous afib with certainty.  It still might be wise for her to check her rhythm regularly by pulse or Kardia device or Apple watch.  There are no further cardiac studies to be done and no cardiac problems to follow.  Her pmd can prescribe and manage her statin.  I would be happy to see her if there are further cardiac issues/problems/questions.  Follow up prn

## 2021-01-05 NOTE — TELEPHONE ENCOUNTER
Called patient. Left a voicemail message. Awaiting call back.     TYESHA Ledbetter RNN  [facility]  January 5, 2021 (1221 pm)      --------------------------------------------------------------------------------------     Addendum    Patient returned my call. Reviewed results and Dr. Pratt's recommendations. Patient had no questions.     CORY Ledbetter RN     [facility] ~ [place], [place]  January 5, 2021 ([phone])

## 2021-01-09 ENCOUNTER — HEALTH MAINTENANCE LETTER (OUTPATIENT)
Age: 61
End: 2021-01-09

## 2021-03-16 DIAGNOSIS — I21.4 NSTEMI (NON-ST ELEVATED MYOCARDIAL INFARCTION) (H): ICD-10-CM

## 2021-03-16 DIAGNOSIS — Z86.73 HISTORY OF CVA (CEREBROVASCULAR ACCIDENT): ICD-10-CM

## 2021-03-16 LAB
ALT SERPL W P-5'-P-CCNC: 23 U/L (ref 0–50)
CHOLEST SERPL-MCNC: 178 MG/DL
HDLC SERPL-MCNC: 90 MG/DL
LDLC SERPL CALC-MCNC: 75 MG/DL
NONHDLC SERPL-MCNC: 88 MG/DL
TRIGL SERPL-MCNC: 65 MG/DL

## 2021-03-16 PROCEDURE — 84460 ALANINE AMINO (ALT) (SGPT): CPT | Performed by: INTERNAL MEDICINE

## 2021-03-16 PROCEDURE — 36415 COLL VENOUS BLD VENIPUNCTURE: CPT | Performed by: INTERNAL MEDICINE

## 2021-03-16 PROCEDURE — 80061 LIPID PANEL: CPT | Performed by: INTERNAL MEDICINE

## 2021-03-18 ENCOUNTER — TELEPHONE (OUTPATIENT)
Dept: CARDIOLOGY | Facility: CLINIC | Age: 61
End: 2021-03-18

## 2021-03-18 NOTE — TELEPHONE ENCOUNTER
"Labs completed     Component      Latest Ref Rng & Units 3/16/2021   Cholesterol      <200 mg/dL 178   Triglycerides      <150 mg/dL 65   HDL Cholesterol      >49 mg/dL 90   LDL Cholesterol Calculated      <100 mg/dL 75   Non HDL Cholesterol      <130 mg/dL 88   ALT      0 - 50 U/L 23        ----- Message -----  From: Mary Santizo RN  Sent: 3/16/2021   4:49 PM CDT  To: Brad Pratt MD, #    Forwarded results to Broderick Siu PA-C (primary care provider)    Dr. Pratt's note, \"History of CVA and NSTEMI, question of aortic valve abnormality. RAD: nl AV, no cardiac source of emboli. Monitor: no afib to explain cardiac source of emboli. We cannot rule out previous afib with certainty.  It still might be wise for her to check her rhythm regularly by pulse or Kardia device or Apple watch.  There are no further cardiac studies to be done and no cardiac problems to follow.  Her pmd can prescribe and manage her statin. I would be happy to see her if there are further cardiac issues/problems/questions. Follow up prn.\"    Mary \"Holger\" TYESHA CAMPUZANON/Dr. Pratt's Nurse    [facility] [place], [place]    ----- Message from Brad Pratt MD sent at 3/16/2021  9:20 PM CDT -----  Thanks           ~ Spoke with patient. Reviewed results and recommendations. Patient was advised to f/u with us on a as-needed basis and to f/u with PCP for refills and management of her Lipitor.  Patient was instructed to call the clinic if she has questions or concerns.      Anatoly CAMPUZANO, BSN     [facility] ~ [place], [place]  March 18, 2021 ([phone])                "

## 2021-05-28 NOTE — PROGRESS NOTES
Assessment & Plan       ICD-10-CM    1. Benign essential hypertension  I10 lisinopril (ZESTRIL) 10 MG tablet   2. Hx of non-ST elevation myocardial infarction (NSTEMI)  I25.2    3. History of stroke  Z86.73 Cardiolipin Lisette IgG and IgM     Lupus Anticoagulant Panel     Oncology/Hematology Adult Referral       1. Blood pressure is stable. Continue current dose of lisinopril. Will refill for the one year.    2-3. History of NSTEMI in 2019 and CVA in 2020. She remains on aspirin 81 mg and Lipitor. Upon reviewing her coagulation work up after her stroke, she had a slightly elevated cardiolipin IgM and PTT on lupus anticoagulant panel and it does not appear this was ever addressed or rechecked. Will recheck these levels today and I recommend she see hematology because if she has something like antiphospholipid antibody syndrome, she will need to be on chronic anticoagulation to prevent further strokes/clots.     Follow up annually.      FLORENCIA Crawley [facility] CLAIRE Marte is a 60 year old who presents for the following health issues     History of Present Illness       Hypertension: She presents for follow up of hypertension.  She does not check blood pressure  regularly outside of the clinic. Outpatient blood pressures have not been over 140/90. She does not follow a low salt diet.     She eats 2-3 servings of fruits and vegetables daily.She consumes 0 sweetened beverage(s) daily.She exercises with enough effort to increase her heart rate 30 to 60 minutes per day.  She exercises with enough effort to increase her heart rate 5 days per week.   She is taking medications regularly.     She has a history of an NSTEMI with normal coronary angiogram in July 2019 and then a subacute thalamic CVA in April 2020. Besides a slightly elevated cardiolipin antibody and lupus anticoagulant, she has had a negative work up including EKG, TTE, RAD, and cardiac monitoring. She also saw  "neurology but no further work up was recommended. She remains on 81 mg of aspirin along with atorvastatin. She is also on lisinopril for hypertension. She quit smoking 1 year ago after her stroke and states that event was a big wake up call for her health. She never saw hematology after her coagulation work up last year.     Review of Systems   Constitutional, cardiovascular, pulmonary, psych, gi and gu systems are negative, except as otherwise noted.      Objective    /72   Pulse 73   Temp 98.2  F (36.8  C) (Temporal)   Resp 14   Ht 1.597 m (5' 2.87\")   Wt 71.2 kg (157 lb)   LMP  (LMP Unknown)   SpO2 98%   BMI 27.92 kg/m    Body mass index is 27.92 kg/m .  Physical Exam   GENERAL: healthy, alert and no distress  RESP: lungs clear to auscultation - no rales, rhonchi or wheezes  CV: regular rate and rhythm, normal S1 S2, no S3 or S4, no murmur, click or rub, no peripheral edema and peripheral pulses strong  NEURO: Normal strength and tone, mentation intact and speech normal. Gait is stable.   PSYCH: mentation appears normal, affect normal/bright      "

## 2021-06-02 ENCOUNTER — OFFICE VISIT (OUTPATIENT)
Dept: FAMILY MEDICINE | Facility: OTHER | Age: 61
End: 2021-06-02
Payer: COMMERCIAL

## 2021-06-02 VITALS
SYSTOLIC BLOOD PRESSURE: 114 MMHG | WEIGHT: 157 LBS | HEIGHT: 63 IN | BODY MASS INDEX: 27.82 KG/M2 | TEMPERATURE: 98.2 F | OXYGEN SATURATION: 98 % | DIASTOLIC BLOOD PRESSURE: 72 MMHG | HEART RATE: 73 BPM | RESPIRATION RATE: 14 BRPM

## 2021-06-02 DIAGNOSIS — I25.2 HX OF NON-ST ELEVATION MYOCARDIAL INFARCTION (NSTEMI): ICD-10-CM

## 2021-06-02 DIAGNOSIS — Z86.73 HISTORY OF STROKE: ICD-10-CM

## 2021-06-02 DIAGNOSIS — I10 BENIGN ESSENTIAL HYPERTENSION: Primary | ICD-10-CM

## 2021-06-02 PROCEDURE — 36415 COLL VENOUS BLD VENIPUNCTURE: CPT | Performed by: PHYSICIAN ASSISTANT

## 2021-06-02 PROCEDURE — 85597 PHOSPHOLIPID PLTLT NEUTRALIZ: CPT | Performed by: PHYSICIAN ASSISTANT

## 2021-06-02 PROCEDURE — 99N1035 PR STATISTIC THROMBIN TIME NC: Performed by: PHYSICIAN ASSISTANT

## 2021-06-02 PROCEDURE — 99214 OFFICE O/P EST MOD 30 MIN: CPT | Performed by: PHYSICIAN ASSISTANT

## 2021-06-02 PROCEDURE — 85613 RUSSELL VIPER VENOM DILUTED: CPT | Performed by: PHYSICIAN ASSISTANT

## 2021-06-02 PROCEDURE — 86147 CARDIOLIPIN ANTIBODY EA IG: CPT | Performed by: PHYSICIAN ASSISTANT

## 2021-06-02 PROCEDURE — 85730 THROMBOPLASTIN TIME PARTIAL: CPT | Performed by: PHYSICIAN ASSISTANT

## 2021-06-02 PROCEDURE — 99N1023 PR STATISTIC INR NC: Performed by: PHYSICIAN ASSISTANT

## 2021-06-02 PROCEDURE — 85732 THROMBOPLASTIN TIME PARTIAL: CPT | Performed by: PHYSICIAN ASSISTANT

## 2021-06-02 RX ORDER — LISINOPRIL 10 MG/1
10 TABLET ORAL DAILY
Qty: 90 TABLET | Refills: 3 | Status: SHIPPED | OUTPATIENT
Start: 2021-06-02 | End: 2022-06-22

## 2021-06-02 ASSESSMENT — MIFFLIN-ST. JEOR: SCORE: 1259.27

## 2021-06-02 NOTE — PATIENT INSTRUCTIONS
Continue current medications.    Will order a few updated labs and have you see hematology given the stroke last year.    Follow up annually.

## 2021-06-02 NOTE — Clinical Note
Please abstract the following data from this visit with this patient into the appropriate field in Epic:    Tests that can be patient reported without a hard copy:    Pap smear done on this date: 10/6/2020 (approximately), by this group: [facility], results were normal.     Other Tests found in the patient's chart through Chart Review/Care Everywhere:    Pap smear done by this group [facility] this date: 10/6/2020    Note to Abstraction: If this section is blank, no results were found via Chart Review/Care Everywhere.

## 2021-06-03 LAB
CARDIOLIPIN ANTIBODY IGG: <1.6 GPL-U/ML (ref 0–19.9)
CARDIOLIPIN ANTIBODY IGM: 7.8 MPL-U/ML (ref 0–19.9)

## 2021-06-04 ENCOUNTER — MYC MEDICAL ADVICE (OUTPATIENT)
Dept: FAMILY MEDICINE | Facility: OTHER | Age: 61
End: 2021-06-04

## 2021-06-04 LAB — LA PPP-IMP: NEGATIVE

## 2021-08-04 NOTE — PROGRESS NOTES
[facility]  [address] [place] [id number]  Main: [phone], Fax: [phone]    Patient seen at: [facility] at [address] [place] [address]    Outpatient Visit Note:    Patient: Rosanna Ivory  MRN: [id number]  : 1960  LAMONTE: 2021    Reason:  History of CVA. Isolated one time slightly elevated Cardiolipin IgM Lisette.     HPI:  Rosanna is a 61 year old female with a history of hypertension, NSTEMI with normal coronary angiogram in 2019 and a subacute thalamic CVA in 2020, referred by Erlin Siu PA-C for consultation in regard to a single isolated positive Cardiolipin IgM Lisette.     Rosanna presented to [facility] emergency department back on 2020 with left arm numbness radiating to the left side of her neck and face. MRI at the time showed a subacute right thalamic lacunar stroke. MRA of head/neck and an echocardiogram with bubble study were normal. She was determined not a candidate for tPA at the time and was placed on aspirin and atorvastatin and was discharged home on 2020. Also during her hospitalization, a inherit and acquired thrombophilia workup was done: Factor V Leiden mutation and prothrombin gene mutation were negative; Protein C, Protein S and Antithrombin III levels were normal; Lupus anticoagulant test was negative; Beta 2 Glycoprotein Lisette test was negative; Cardiolipin IgA was negative; Cardiolipin IgG was negative; Cardiolipin IgM was slightly elevated at 25.0 (normal </= 20.0). She denies any use of estrogen containing products at the time, however, she recalls that she was living a rather unhealthy lifestyle at the time and was smoking cigarettes. She has remained on a daily aspirin since.     Back on 2021, she had a visit with her primary care provider, Broderick Siu PA-C, and felt that she should follow up with a hematologist to address the positive Cardiolipin IgM level.  "Broderick repeated Lupus anticoagulant and Cardiolipin Lisette panel on 6/2/2021 and all came back negative (Cardiolipin IgM at 7.8).     Since her CVA back in May 2020, she has quit smoking, started on Lisinopril and Lipitor and aspirin. She reports that she is more aware and making much more healthy choices and maintaining a healthier lifestyle.     ROS:  Denies any bleeding issues. Denies any epistaxis. No oral mucosal bleeding. Denies any hematuria or blood in stools.     Medication:  Current Outpatient Medications   Medication     aspirin (ASA) 81 MG chewable tablet     atorvastatin (LIPITOR) 40 MG tablet     fish oil-omega-3 fatty acids 1000 MG capsule     fluticasone (FLONASE) 50 MCG/ACT nasal spray     lisinopril (ZESTRIL) 10 MG tablet     No current facility-administered medications for this visit.     Allergies:  Allergies   Allergen Reactions     Iodine      PmHx:  Past Medical History:   Diagnosis Date     Alcohol abuse 2013    treatment     Allergic rhinitis, cause unspecified 2000    seasonal     ASCUS on Pap smear 7/30/10    HPV + 52 (high risk)     Esophageal reflux      External hemorrhoids without mention of complication      History of colposcopy with cervical biopsy 8/23/10    bx- cervicitis.  no dysplasia noted     Other chest pain 2000    atypical, normal stress testing     Pure hypercholesterolemia     total 220     Social History:  Deferred.    Family History:  She denies any family history of CVA    Objective:  Pleasant in no acute distress.  Vitals: BP (!) 153/91 (BP Location: Right arm, Patient Position: Sitting, Cuff Size: Adult Regular)   Pulse 67   Temp 97.8  F (36.6  C) (Oral)   Resp 14   Ht 1.613 m (5' 3.5\")   Wt 70.3 kg (155 lb)   SpO2 100%   BMI 27.03 kg/m    Exam:  Complete exam is not performed today.   I do note her BP is 153/91 today. Rosanna reports that she is [place]     Labs:  As described above in the HPI section of this note. "     Imaging:  Results of the MRI of the brain, MRA of the neck and head and TTE done at [facility] are all reviewed by this writer and are as described in the HPI section of this note.     Assessment:  In summary, Rosanna is a 61 year old female with a history of hypertension, NSTEMI and CVA back in April 2020, who was found to have a single isolated mildly elevated positive Cardiolipin IgM back on 5/1/2020 with repeat testing done on 6/2/2021 being negative, referred by Broderick Siu PA-C, primary care provider for consultation.     With the negative repeat Cardiolipin Lisette testing, this patient does NOT have antiphospholipid antibody syndrome (APS). The etiology of her CVA back in April 2020 remains unclear. However, at the time, she was a cigarette smoker, she also had uncontrolled hypertension. Since her CVA, she has quit smoking and started on both anti-hypertensive and cholesterol lowering agent along with healthier lifestyle choices as well as a daily aspirin.     Diagnosis:  1. History of CVA back in April 2020.   2. Single isolated mildly elevated Cardiolipin IgM Lisette in April 2020 with negative repeat testing on 6/2/2021. This is clinically not significant.   3. Long term antiplatelet therapy with aspirin.     Plan:  No further hematological evaluation is indicated or necessary for this patient in regard to her history of CVA. She does not have antiphospholipid antibody syndrome (APS) or any inherit thrombophilia as these were all tested during her admission back in April/May 2020.     I congratulate her about smoking cessation. I also encourage her to continue daily aspirin indefinitely as long as she has no issues with bleeding complications, for secondary stroke prevention. Continue with her healthy lifestyle choices and optimize secondary stroke prevention strategies including good blood pressure management and hypercholesterolemia control.     At this time, I have no further plans to see her back on a  routine basis.     Thank you for letting us to participate in this patient's care.    16 minutes spent on the date of the encounter doing chart review, history and exam, documentation and further activities per the note.    Time IN: 13:03  Time OUT: 13:10        Cullen Guajardo PA-C, MPAS  Physician Assistant  [facility].

## 2021-08-05 ENCOUNTER — OFFICE VISIT (OUTPATIENT)
Dept: HEMATOLOGY | Facility: CLINIC | Age: 61
End: 2021-08-05
Attending: PHYSICIAN ASSISTANT
Payer: COMMERCIAL

## 2021-08-05 VITALS
HEIGHT: 64 IN | RESPIRATION RATE: 14 BRPM | TEMPERATURE: 97.8 F | BODY MASS INDEX: 26.46 KG/M2 | HEART RATE: 67 BPM | DIASTOLIC BLOOD PRESSURE: 91 MMHG | WEIGHT: 155 LBS | SYSTOLIC BLOOD PRESSURE: 153 MMHG | OXYGEN SATURATION: 100 %

## 2021-08-05 DIAGNOSIS — Z86.73 HISTORY OF STROKE: ICD-10-CM

## 2021-08-05 PROCEDURE — 99202 OFFICE O/P NEW SF 15 MIN: CPT | Performed by: PHYSICIAN ASSISTANT

## 2021-08-05 PROCEDURE — G0463 HOSPITAL OUTPT CLINIC VISIT: HCPCS

## 2021-08-05 ASSESSMENT — MIFFLIN-ST. JEOR: SCORE: 1255.14

## 2021-08-05 ASSESSMENT — PAIN SCALES - GENERAL: PAINLEVEL: NO PAIN (0)

## 2021-08-05 NOTE — NURSING NOTE
Rosanna Ivory  [id number]  1960    Rosanna Ivory is here for [facility]OT visit with Cullen Guajardo PA-C . She is being seen for stroke.    Vital signs were taken and assessed.  Allergies and medications were reviewed and updated by [facility] staff.  Vicki Fuller, MSN, RN, PHN -Nurse Clinician, [facility] [phone]

## 2021-08-25 ENCOUNTER — TRANSFERRED RECORDS (OUTPATIENT)
Dept: HEALTH INFORMATION MANAGEMENT | Facility: CLINIC | Age: 61
End: 2021-08-25

## 2021-08-31 ENCOUNTER — MYC MEDICAL ADVICE (OUTPATIENT)
Dept: FAMILY MEDICINE | Facility: OTHER | Age: 61
End: 2021-08-31

## 2021-08-31 DIAGNOSIS — Z12.31 ENCOUNTER FOR SCREENING MAMMOGRAM FOR BREAST CANCER: Primary | ICD-10-CM

## 2021-10-05 ENCOUNTER — ANCILLARY PROCEDURE (OUTPATIENT)
Dept: MAMMOGRAPHY | Facility: OTHER | Age: 61
End: 2021-10-05
Attending: PHYSICIAN ASSISTANT
Payer: COMMERCIAL

## 2021-10-05 DIAGNOSIS — Z12.31 ENCOUNTER FOR SCREENING MAMMOGRAM FOR BREAST CANCER: ICD-10-CM

## 2021-10-05 PROCEDURE — 77067 SCR MAMMO BI INCL CAD: CPT | Mod: TC | Performed by: RADIOLOGY

## 2021-10-05 PROCEDURE — 77063 BREAST TOMOSYNTHESIS BI: CPT | Mod: TC | Performed by: RADIOLOGY

## 2021-10-23 ENCOUNTER — HEALTH MAINTENANCE LETTER (OUTPATIENT)
Age: 61
End: 2021-10-23

## 2021-12-09 ENCOUNTER — MYC MEDICAL ADVICE (OUTPATIENT)
Dept: FAMILY MEDICINE | Facility: OTHER | Age: 61
End: 2021-12-09
Payer: COMMERCIAL

## 2021-12-30 ENCOUNTER — TELEPHONE (OUTPATIENT)
Dept: CARDIOLOGY | Facility: CLINIC | Age: 61
End: 2021-12-30
Payer: COMMERCIAL

## 2021-12-30 DIAGNOSIS — I21.4 NSTEMI (NON-ST ELEVATED MYOCARDIAL INFARCTION) (H): ICD-10-CM

## 2021-12-30 RX ORDER — ATORVASTATIN CALCIUM 40 MG/1
40 TABLET, FILM COATED ORAL DAILY
Qty: 90 TABLET | Refills: 3 | Status: SHIPPED | OUTPATIENT
Start: 2021-12-30 | End: 2022-10-27

## 2021-12-30 NOTE — TELEPHONE ENCOUNTER
Patient is not having any cardiac issues and will cancel OV with Dr. Pratt and will follow up with future refills and labs with Broderick Siu PA-C she is do for FLP/ALT in March of 2022. Will see Cardiology on a prn basis

## 2021-12-30 NOTE — TELEPHONE ENCOUNTER
Patient states she has an OV with Dr. Pratt on 1- and will be out of her atorvastatin 40mg and is requesting a refill to [facility] in [place].  Reviewed last phone note and OV from Dr. Pratt   3-18-21     Spoke with patient. Reviewed results and recommendations. Patient was advised to f/u with us on a as-needed basis and to f/u with PCP for refills and management of her Lipitor.  Patient was instructed to call the clinic if she has questions or concerns.      Left message for patient to call back for clarification   Last FLP/ALT 3-2021

## 2022-02-12 ENCOUNTER — HEALTH MAINTENANCE LETTER (OUTPATIENT)
Age: 62
End: 2022-02-12

## 2022-04-12 ENCOUNTER — NURSE TRIAGE (OUTPATIENT)
Dept: NURSING | Facility: CLINIC | Age: 62
End: 2022-04-12
Payer: COMMERCIAL

## 2022-04-12 NOTE — TELEPHONE ENCOUNTER
"Triage call:     Patient calling with cough and congestion that started last Thursday.   She reports symptoms feeling like a \"chest cold.\" She is additionally tired and sluggish. Cough and congestion keep her from sleeping at night. She hears herself wheezing when she lays down. She states it takes effort to take a deep breath and fill her lungs completely.   Taking mucinex.     Per protocol, patient was advised to be seen today in clinic. No appointments available, advised walk in care. Care advice given. She verbalizes understanding and agreement with this plan. She plans on going to an urgent care near her house.     Svitlana Machado RN   04/12/22 9:15 AM  [facility] Nurse Advisor  Reason for Disposition    Wheezing is present    Additional Information    Negative: Severe difficulty breathing (e.g., struggling for each breath, speaks in single words)    Negative: Bluish (or gray) lips or face now    Negative: [1] Difficulty breathing AND [2] exposure to flames, smoke, or fumes    Negative: [1] Stridor AND [2] difficulty breathing    Negative: Sounds like a life-threatening emergency to the triager    Negative: [1] Previous asthma attacks AND [2] this feels like asthma attack    Negative: Dry (non-productive) cough (i.e., no sputum or minimal clear sputum)    Negative: Chest pain  (Exception: MILD central chest pain, present only when coughing)    Negative: Difficulty breathing    Negative: Patient sounds very sick or weak to the triager    Negative: [1] Coughed up blood AND [2] > 1 tablespoon (15 ml) (Exception: blood-tinged sputum)    Negative: Fever > 103 F (39.4 C)    Negative: [1] Fever > 101 F (38.3 C) AND [2] age > 60    Negative: [1] Fever > 100.0 F (37.8 C) AND [2] bedridden (e.g., nursing home patient, CVA, chronic illness, recovering from surgery)    Negative: [1] Fever > 100.0 F (37.8 C) AND [2] diabetes mellitus or weak immune system (e.g., HIV positive, cancer chemo, splenectomy, organ " transplant, chronic steroids)    Protocols used: COUGH - ACUTE [id number]  COVID 19 Nurse Triage Plan/Patient Instructions    Please be aware that novel coronavirus (COVID-19) may be circulating in the community. If you develop symptoms such as fever, cough, or SOB or if you have concerns about the presence of another infection including coronavirus (COVID-19), please contact your health care provider or visit [email or web address].     Disposition/Instructions    In-Person Visit with provider recommended. Reference Visit Selection Guide.    Thank you for taking steps to prevent the spread of this virus.  o Limit your contact with others.  o Wear a simple mask to cover your cough.  o Wash your hands well and often.    Resources    [facility]: About COVID-19: [email or web address]    CDC: What to Do If You're Sick: www.cdc.gov/coronavirus/2019-ncov/about/steps-when-sick.html    CDC: Ending Home Isolation: www.cdc.gov/coronavirus/2019-ncov/hcp/disposition-in-home-patients.html     CDC: Caring for Someone: www.cdc.gov/coronavirus/2019-ncov/if-you-are-sick/care-for-someone.html     [facility]: Interim Guidance for Hospital Discharge to Home: www.health.[email or web address]/diseases/coronavirus/hcp/hospdischarge.pdf    [facility] clinical trials (COVID-19 research studies): [email or web address]-clinical-trials     Below are the COVID-19 hotlines at the [facility] ([facility]). Interpreters are available.   o For health questions: Call 323-434-4376 or 1-250.470.1918 (7 a.m. to 7 p.m.)  o For questions about schools and childcare: Call 931-390-8365 or 1-366.904.2713 (7 a.m. to 7 p.m.)

## 2022-07-12 ENCOUNTER — MYC MEDICAL ADVICE (OUTPATIENT)
Dept: FAMILY MEDICINE | Facility: OTHER | Age: 62
End: 2022-07-12

## 2022-07-18 ENCOUNTER — E-VISIT (OUTPATIENT)
Dept: URGENT CARE | Facility: CLINIC | Age: 62
End: 2022-07-18
Payer: COMMERCIAL

## 2022-07-18 DIAGNOSIS — J01.90 ACUTE BACTERIAL SINUSITIS: Primary | ICD-10-CM

## 2022-07-18 DIAGNOSIS — B96.89 ACUTE BACTERIAL SINUSITIS: Primary | ICD-10-CM

## 2022-07-18 PROCEDURE — 99421 OL DIG E/M SVC 5-10 MIN: CPT | Performed by: FAMILY MEDICINE

## 2022-07-18 NOTE — PATIENT INSTRUCTIONS
Dear Rosanna Ivory    After reviewing your responses, I've been able to diagnose you with?a sinus infection caused by bacteria.?     Based on your responses and diagnosis, I have prescribed Augmentin to treat your symptoms. I have sent this to your pharmacy.?     It is also important to stay well hydrated, get lots of rest and take over-the-counter decongestants,?tylenol?or ibuprofen if you?are able to?take those medications per your primary care provider to help relieve discomfort.?     It is important that you take?all of?your prescribed medication even if your symptoms are improving after a few doses.? Taking?all of?your medicine helps prevent the symptoms from returning.?     If your symptoms worsen, you develop severe headache, vomiting, high fever (>102), or are not improving in 7 days, please contact your primary care provider for an appointment or visit any of our convenient Walk-in Care or Urgent Care Centers to be seen which can be found on our website?here.?     Thanks again for choosing?us?as your health care partner,?   ?  Heidy Santacruz MD?

## 2022-07-19 ENCOUNTER — MYC MEDICAL ADVICE (OUTPATIENT)
Dept: FAMILY MEDICINE | Facility: OTHER | Age: 62
End: 2022-07-19

## 2022-07-19 DIAGNOSIS — J01.90 ACUTE SINUSITIS WITH SYMPTOMS > 10 DAYS: Primary | ICD-10-CM

## 2022-07-19 RX ORDER — DOXYCYCLINE 100 MG/1
100 CAPSULE ORAL 2 TIMES DAILY
Qty: 20 CAPSULE | Refills: 0 | Status: SHIPPED | OUTPATIENT
Start: 2022-07-19 | End: 2022-10-27

## 2022-09-09 ENCOUNTER — TRANSFERRED RECORDS (OUTPATIENT)
Dept: HEALTH INFORMATION MANAGEMENT | Facility: CLINIC | Age: 62
End: 2022-09-09

## 2022-09-18 DIAGNOSIS — I10 BENIGN ESSENTIAL HYPERTENSION: ICD-10-CM

## 2022-09-19 RX ORDER — LISINOPRIL 10 MG/1
TABLET ORAL
Qty: 90 TABLET | Refills: 0 | Status: SHIPPED | OUTPATIENT
Start: 2022-09-19 | End: 2022-11-02

## 2022-10-09 ENCOUNTER — HEALTH MAINTENANCE LETTER (OUTPATIENT)
Age: 62
End: 2022-10-09

## 2022-10-13 ENCOUNTER — ANCILLARY PROCEDURE (OUTPATIENT)
Dept: MAMMOGRAPHY | Facility: OTHER | Age: 62
End: 2022-10-13
Attending: PHYSICIAN ASSISTANT
Payer: COMMERCIAL

## 2022-10-13 DIAGNOSIS — Z12.31 VISIT FOR SCREENING MAMMOGRAM: ICD-10-CM

## 2022-10-13 PROCEDURE — 77063 BREAST TOMOSYNTHESIS BI: CPT | Mod: TC | Performed by: RADIOLOGY

## 2022-10-13 PROCEDURE — 77067 SCR MAMMO BI INCL CAD: CPT | Mod: TC | Performed by: RADIOLOGY

## 2022-10-24 ENCOUNTER — TRANSFERRED RECORDS (OUTPATIENT)
Dept: HEALTH INFORMATION MANAGEMENT | Facility: CLINIC | Age: 62
End: 2022-10-24

## 2022-10-27 ENCOUNTER — OFFICE VISIT (OUTPATIENT)
Dept: FAMILY MEDICINE | Facility: OTHER | Age: 62
End: 2022-10-27
Payer: COMMERCIAL

## 2022-10-27 VITALS
SYSTOLIC BLOOD PRESSURE: 142 MMHG | RESPIRATION RATE: 15 BRPM | OXYGEN SATURATION: 99 % | DIASTOLIC BLOOD PRESSURE: 90 MMHG | BODY MASS INDEX: 26.94 KG/M2 | TEMPERATURE: 97.6 F | HEART RATE: 78 BPM | WEIGHT: 154.5 LBS

## 2022-10-27 DIAGNOSIS — I10 BENIGN ESSENTIAL HYPERTENSION: Primary | ICD-10-CM

## 2022-10-27 DIAGNOSIS — Z23 NEED FOR TDAP VACCINATION: ICD-10-CM

## 2022-10-27 DIAGNOSIS — M16.12 PRIMARY OSTEOARTHRITIS OF LEFT HIP: ICD-10-CM

## 2022-10-27 DIAGNOSIS — E78.5 HYPERLIPIDEMIA LDL GOAL <70: ICD-10-CM

## 2022-10-27 DIAGNOSIS — I21.4 NSTEMI (NON-ST ELEVATED MYOCARDIAL INFARCTION) (H): ICD-10-CM

## 2022-10-27 PROBLEM — F10.20 ALCOHOL DEPENDENCE (H): Status: RESOLVED | Noted: 2018-06-13 | Resolved: 2022-10-27

## 2022-10-27 LAB
ANION GAP SERPL CALCULATED.3IONS-SCNC: 5 MMOL/L (ref 3–14)
BUN SERPL-MCNC: 17 MG/DL (ref 7–30)
CALCIUM SERPL-MCNC: 9.2 MG/DL (ref 8.5–10.1)
CHLORIDE BLD-SCNC: 97 MMOL/L (ref 94–109)
CHOLEST SERPL-MCNC: 165 MG/DL
CO2 SERPL-SCNC: 29 MMOL/L (ref 20–32)
CREAT SERPL-MCNC: 0.64 MG/DL (ref 0.52–1.04)
FASTING STATUS PATIENT QL REPORTED: NO
GFR SERPL CREATININE-BSD FRML MDRD: >90 ML/MIN/1.73M2
GLUCOSE BLD-MCNC: 117 MG/DL (ref 70–99)
HDLC SERPL-MCNC: 89 MG/DL
LDLC SERPL CALC-MCNC: 57 MG/DL
NONHDLC SERPL-MCNC: 76 MG/DL
POTASSIUM BLD-SCNC: 4 MMOL/L (ref 3.4–5.3)
SODIUM SERPL-SCNC: 131 MMOL/L (ref 133–144)
TRIGL SERPL-MCNC: 95 MG/DL

## 2022-10-27 PROCEDURE — 90715 TDAP VACCINE 7 YRS/> IM: CPT | Performed by: PHYSICIAN ASSISTANT

## 2022-10-27 PROCEDURE — 90471 IMMUNIZATION ADMIN: CPT | Performed by: PHYSICIAN ASSISTANT

## 2022-10-27 PROCEDURE — 80048 BASIC METABOLIC PNL TOTAL CA: CPT | Performed by: PHYSICIAN ASSISTANT

## 2022-10-27 PROCEDURE — 80061 LIPID PANEL: CPT | Performed by: PHYSICIAN ASSISTANT

## 2022-10-27 PROCEDURE — 36415 COLL VENOUS BLD VENIPUNCTURE: CPT | Performed by: PHYSICIAN ASSISTANT

## 2022-10-27 PROCEDURE — 99214 OFFICE O/P EST MOD 30 MIN: CPT | Mod: 25 | Performed by: PHYSICIAN ASSISTANT

## 2022-10-27 RX ORDER — ATORVASTATIN CALCIUM 40 MG/1
40 TABLET, FILM COATED ORAL DAILY
Qty: 90 TABLET | Refills: 3 | Status: SHIPPED | OUTPATIENT
Start: 2022-10-27 | End: 2023-10-10

## 2022-10-27 ASSESSMENT — PAIN SCALES - GENERAL: PAINLEVEL: NO PAIN (0)

## 2022-10-27 NOTE — PROGRESS NOTES
Assessment & Plan       ICD-10-CM    1. Benign essential hypertension  I10 Basic metabolic panel  (Ca, Cl, CO2, Creat, Gluc, K, Na, BUN)     Basic metabolic panel  (Ca, Cl, CO2, Creat, Gluc, K, Na, BUN)      2. Hyperlipidemia LDL goal <70  E78.5 Lipid panel reflex to direct LDL Fasting     Lipid panel reflex to direct LDL Fasting      3. NSTEMI (non-ST elevated myocardial infarction) (H)  I21.4 atorvastatin (LIPITOR) 40 MG tablet      4. Primary osteoarthritis of left hip  M16.12       5. Need for Tdap vaccination  Z23 TDAP VACCINE (Adacel, Boostrix)          1. BP elevated during both readings today but she prefers to monitor home pressures for the next month and if consistently elevated, will increase lisinopril to 20 mg. Continue with a low salt diet and routine exercise.    2-3. Updated lipid panel ordered. Continue Lipitor. NSTEMI in 2019 with unknown cause she had a normal coronary catheterization.    4. She has increasing left hip pain and may be undergoing a left hip replacement in the near future as she has tried and failed a hip injection.    5. Tdap administered by MA.    Follow-up annually.        FLORENCIA Crawley [facility] CLAIRE Marte is a 62 year old, presenting for the following health issues:  Recheck Medication and Hypertension      History of Present Illness       Hypertension: She presents for follow up of hypertension.  She does not check blood pressure  regularly outside of the clinic. Outside blood pressures have been over 140/90. She follows a low salt diet.       She does not check her home pressures but is planning on buying a new cuff.    She remains on a daily 81 mg aspirin and atorvastatin.    Review of Systems   Constitutional, HEENT, cardiovascular, pulmonary, gi and gu systems are negative, except as otherwise noted.      Objective    BP (!) 142/90   Pulse 78   Temp 97.6  F (36.4  C) (Oral)   Resp 15   Wt 70.1 kg (154 lb 8 oz)   SpO2 99%    BMI 26.94 kg/m    Body mass index is 26.94 kg/m .  Physical Exam   GENERAL: healthy, alert and no distress  RESP: lungs clear to auscultation - no rales, rhonchi or wheezes  CV: regular rate and rhythm, normal S1 S2, no S3 or S4, no murmur, click or rub, no peripheral edema and peripheral pulses strong  NEURO: Normal strength and tone, mentation intact and speech normal. Gait is stable.   PSYCH: mentation appears normal, affect normal/bright

## 2022-10-27 NOTE — PATIENT INSTRUCTIONS
Monitor home blood pressures closely.  If consistently above 140/90, let me know.    Continue atorvastatin.    Follow up annually or sooner as needed.

## 2022-11-02 ENCOUNTER — MYC MEDICAL ADVICE (OUTPATIENT)
Dept: FAMILY MEDICINE | Facility: OTHER | Age: 62
End: 2022-11-02

## 2022-11-02 DIAGNOSIS — I10 BENIGN ESSENTIAL HYPERTENSION: Primary | ICD-10-CM

## 2022-11-02 RX ORDER — LISINOPRIL 20 MG/1
20 TABLET ORAL DAILY
Qty: 90 TABLET | Refills: 3 | Status: SHIPPED | OUTPATIENT
Start: 2022-11-02 | End: 2023-10-02

## 2022-11-08 ENCOUNTER — TRANSFERRED RECORDS (OUTPATIENT)
Dept: HEALTH INFORMATION MANAGEMENT | Facility: CLINIC | Age: 62
End: 2022-11-08

## 2022-11-25 ENCOUNTER — MYC MEDICAL ADVICE (OUTPATIENT)
Dept: FAMILY MEDICINE | Facility: OTHER | Age: 62
End: 2022-11-25

## 2022-12-07 ENCOUNTER — MYC MEDICAL ADVICE (OUTPATIENT)
Dept: FAMILY MEDICINE | Facility: OTHER | Age: 62
End: 2022-12-07

## 2022-12-07 ENCOUNTER — OFFICE VISIT (OUTPATIENT)
Dept: FAMILY MEDICINE | Facility: OTHER | Age: 62
End: 2022-12-07
Payer: COMMERCIAL

## 2022-12-07 VITALS
DIASTOLIC BLOOD PRESSURE: 85 MMHG | SYSTOLIC BLOOD PRESSURE: 124 MMHG | WEIGHT: 155 LBS | HEART RATE: 70 BPM | TEMPERATURE: 97.5 F | OXYGEN SATURATION: 97 % | BODY MASS INDEX: 28.52 KG/M2 | HEIGHT: 62 IN

## 2022-12-07 DIAGNOSIS — M16.12 PRIMARY OSTEOARTHRITIS OF LEFT HIP: ICD-10-CM

## 2022-12-07 DIAGNOSIS — I25.2 HX OF NON-ST ELEVATION MYOCARDIAL INFARCTION (NSTEMI): ICD-10-CM

## 2022-12-07 DIAGNOSIS — I10 BENIGN ESSENTIAL HYPERTENSION: ICD-10-CM

## 2022-12-07 DIAGNOSIS — Z01.818 PRE-OP EXAM: Primary | ICD-10-CM

## 2022-12-07 DIAGNOSIS — Z86.73 HISTORY OF STROKE: ICD-10-CM

## 2022-12-07 DIAGNOSIS — E87.1 HYPONATREMIA: ICD-10-CM

## 2022-12-07 LAB
ANION GAP SERPL CALCULATED.3IONS-SCNC: 5 MMOL/L (ref 3–14)
BUN SERPL-MCNC: 13 MG/DL (ref 7–30)
CALCIUM SERPL-MCNC: 8.7 MG/DL (ref 8.5–10.1)
CHLORIDE BLD-SCNC: 97 MMOL/L (ref 94–109)
CO2 SERPL-SCNC: 29 MMOL/L (ref 20–32)
CREAT SERPL-MCNC: 0.62 MG/DL (ref 0.52–1.04)
ERYTHROCYTE [DISTWIDTH] IN BLOOD BY AUTOMATED COUNT: 12 % (ref 10–15)
GFR SERPL CREATININE-BSD FRML MDRD: >90 ML/MIN/1.73M2
GLUCOSE BLD-MCNC: 99 MG/DL (ref 70–99)
HCT VFR BLD AUTO: 41.2 % (ref 35–47)
HGB BLD-MCNC: 13.6 G/DL (ref 11.7–15.7)
MCH RBC QN AUTO: 30.5 PG (ref 26.5–33)
MCHC RBC AUTO-ENTMCNC: 33 G/DL (ref 31.5–36.5)
MCV RBC AUTO: 92 FL (ref 78–100)
PLATELET # BLD AUTO: 355 10E3/UL (ref 150–450)
POTASSIUM BLD-SCNC: 4.1 MMOL/L (ref 3.4–5.3)
RBC # BLD AUTO: 4.46 10E6/UL (ref 3.8–5.2)
SODIUM SERPL-SCNC: 131 MMOL/L (ref 133–144)
WBC # BLD AUTO: 6.4 10E3/UL (ref 4–11)

## 2022-12-07 PROCEDURE — 85027 COMPLETE CBC AUTOMATED: CPT | Performed by: PHYSICIAN ASSISTANT

## 2022-12-07 PROCEDURE — 36415 COLL VENOUS BLD VENIPUNCTURE: CPT | Performed by: PHYSICIAN ASSISTANT

## 2022-12-07 PROCEDURE — 80048 BASIC METABOLIC PNL TOTAL CA: CPT | Performed by: PHYSICIAN ASSISTANT

## 2022-12-07 PROCEDURE — 99214 OFFICE O/P EST MOD 30 MIN: CPT | Performed by: PHYSICIAN ASSISTANT

## 2022-12-07 PROCEDURE — 93000 ELECTROCARDIOGRAM COMPLETE: CPT | Performed by: PHYSICIAN ASSISTANT

## 2022-12-07 ASSESSMENT — PAIN SCALES - GENERAL: PAINLEVEL: NO PAIN (0)

## 2022-12-07 NOTE — PATIENT INSTRUCTIONS
Hold aspirin and fish oil for 7 days prior to surgery.  Hold lisinopril the day of.   Preparing for Your Surgery  Getting started  A nurse will call you to review your health history and instructions. They will give you an arrival time based on your scheduled surgery time. Please be ready to share:  Your doctor's clinic name and phone number  Your medical, surgical, and anesthesia history  A list of allergies and sensitivities  A list of medicines, including herbal treatments and over-the-counter drugs  Whether the patient has a legal guardian (ask how to send us the papers in advance)  Please tell us if you're pregnant--or if there's any chance you might be pregnant. Some surgeries may injure a fetus (unborn baby), so they require a pregnancy test. Surgeries that are safe for a fetus don't always need a test, and you can choose whether to have one.   If you have a child who's having surgery, please ask for a copy of Preparing for Your Child's Surgery.    Preparing for surgery  Within 10 to 30 days of surgery: Have a pre-op exam (sometimes called an H&P, or History and Physical). This can be done at a clinic or pre-operative center.  If you're having a , you may not need this exam. Talk to your care team.  At your pre-op exam, talk to your care team about all medicines you take. If you need to stop any medicines before surgery, ask when to start taking them again.  We do this for your safety. Many medicines can make you bleed too much during surgery. Some change how well surgery (anesthesia) drugs work.  Call your insurance company to let them know you're having surgery. (If you don't have insurance, call [phone].)  Call your clinic if there's any change in your health. This includes signs of a cold or flu (sore throat, runny nose, cough, rash, fever). It also includes a scrape or scratch near the surgery site.  If you have questions on the day of surgery, call your hospital or surgery center.  COVID  testing  You may need to be tested for COVID-19 before having surgery. If so, we will give you instructions (or click here).  Eating and drinking guidelines  For your safety: Unless your surgeon tells you otherwise, follow the guidelines below.  Eat and drink as usual until 8 hours before you arrive for surgery. After that, no food or milk.  Drink clear liquids until 2 hours before you arrive. These are liquids you can see through, like water, Gatorade, and Propel Water. They also include plain black coffee and tea (no cream or milk), candy, and breath mints. You can spit out gum when you arrive.  If you drink alcohol: Stop drinking it the night before surgery.  If your care team tells you to take medicine on the morning of surgery, it's okay to take it with a sip of water.  Preventing infection  Shower or bathe the night before and morning of your surgery. Follow the instructions your clinic gave you. (If no instructions, use regular soap.)  Don't shave or clip hair near your surgery site. We'll remove the hair if needed.  Don't smoke or vape the morning of surgery. You may chew nicotine gum up to 2 hours before surgery. A nicotine patch is okay.  Note: Some surgeries require you to completely quit smoking and nicotine. Check with your surgeon.  Your care team will make every effort to keep you safe from infection. We will:  Clean our hands often with soap and water (or an alcohol-based hand rub).  Clean the skin at your surgery site with a special soap that kills germs.  Give you a special gown to keep you warm. (Cold raises the risk of infection.)  Wear special hair covers, masks, gowns and gloves during surgery.  Give antibiotic medicine, if prescribed. Not all surgeries need antibiotics.  What to bring on the day of surgery  Photo ID and insurance card  Copy of your health care directive, if you have one  Glasses and hearing aids (bring cases)  You can't wear contacts during surgery  Inhaler and eye drops, if  you use them (tell us about these when you arrive)  CPAP machine or breathing device, if you use them  A few personal items, if spending the night  If you have . . .  A pacemaker, ICD (cardiac defibrillator) or other implant: Bring the ID card.  An implanted stimulator: Bring the remote control.  A legal guardian: Bring a copy of the certified (court-stamped) guardianship papers.  Please remove any jewelry, including body piercings. Leave jewelry and other valuables at home.  If you're going home the day of surgery  You must have a responsible adult drive you home. They should stay with you overnight as well.  If you don't have someone to stay with you, and you aren't safe to go home alone, we may keep you overnight. Insurance often won't pay for this.  After surgery  If it's hard to control your pain or you need more pain medicine, please call your surgeon's office.  Questions?   If you have any questions for your care team, list them here: _________________________________________________________________________________________________________________________________________________________________________ ____________________________________ ____________________________________ ____________________________________  For informational purposes only. Not to replace the advice of your health care provider. Copyright   2003, 2019 [facility]. All rights reserved. Clinically reviewed by Ana Rosa Wheeler MD. [employer] [id number] - REV 10/22.

## 2022-12-07 NOTE — PROGRESS NOTES
[address]  [facility] [address]  Phone: [phone]  Primary Provider: Ana Siu  Pre-op Performing Provider: ANA SIU    PREOPERATIVE EVALUATION:  Today's date: 12/7/2022    Rosanna Ivory is a 62 year old female who presents for a preoperative evaluation.    Surgical Information:  Surgery/Procedure: Left Hip Replacement   Surgery Location: [facility]  Surgeon: Dr. Boswell  Surgery Date: 12/22/22  Time of Surgery: TBD  Where patient plans to recover: At home with family  Fax number for surgical facility: [phone]    Type of Anesthesia Anticipated: General    Assessment & Plan     The proposed surgical procedure is considered INTERMEDIATE risk.      ICD-10-CM    1. Pre-op exam  Z01.818 EKG 12-lead complete w/read - Clinics     CBC with platelets     Basic metabolic panel  (Ca, Cl, CO2, Creat, Gluc, K, Na, BUN)     CBC with platelets     Basic metabolic panel  (Ca, Cl, CO2, Creat, Gluc, K, Na, BUN)      2. Primary osteoarthritis of left hip  M16.12       3. Benign essential hypertension  I10 EKG 12-lead complete w/read - Clinics     Basic metabolic panel  (Ca, Cl, CO2, Creat, Gluc, K, Na, BUN)     Basic metabolic panel  (Ca, Cl, CO2, Creat, Gluc, K, Na, BUN)      4. Hx of non-ST elevation myocardial infarction (NSTEMI)  I25.2 EKG 12-lead complete w/read - Clinics      5. Hyponatremia  E87.1       6. History of stroke  Z86.73 EKG 12-lead complete w/read - Clinics            Blood pressure responding well to lisinopril.     Chronic mild hyponatremia, stable.     NSTEMI in 2019 with normal angiogram so cause of NSTEMI was never found. Stroke in 2020. Stable EKG today. Okay to hold aspirin for 7 days.    Cleared for surgery.    Risks and Recommendations:  The patient has the following additional risks and recommendations for perioperative complications:  Cardiovascular:   - History of NSTEMI with normal angiogram in July  2019  History of embolic stroke in April 2020    Medication Instructions:  Hold aspirin and fish oil for 7 days prior to surgery.  Hold lisinopril the day of surgery.      RECOMMENDATION:  APPROVAL GIVEN to proceed with proposed procedure, without further diagnostic evaluation.      Subjective     HPI related to upcoming procedure: She will be undergoing a left hip replacement at [facility] as she has failed conservative treatments for her chronic left hip pain.     Preop Questions 12/6/2022   1. Have you ever had a heart attack or stroke? YES - NSTEMI in 2019, stroke in 2020   2. Have you ever had surgery on your heart or blood vessels, such as a stent placement, a coronary artery bypass, or surgery on an artery in your head, neck, heart, or legs? No   3. Do you have chest pain with activity? No   4. Do you have a history of  heart failure? No   5. Do you currently have a cold, bronchitis or symptoms of other infection? No   6. Do you have a cough, shortness of breath, or wheezing? No   7. Do you or anyone in your family have previous history of blood clots? UNKNOWN   8. Do you or does anyone in your family have a serious bleeding problem such as prolonged bleeding following surgeries or cuts? No   9. Have you ever had problems with anemia or been told to take iron pills? No   10. Have you had any abnormal blood loss such as black, tarry or bloody stools, or abnormal vaginal bleeding? No   11. Have you ever had a blood transfusion? No   12. Are you willing to have a blood transfusion if it is medically needed before, during, or after your surgery? Yes   13. Have you or any of your relatives ever had problems with anesthesia? No   14. Do you have sleep apnea, excessive snoring or daytime drowsiness? No   15. Do you have any artifical heart valves or other implanted medical devices like a pacemaker, defibrillator, or continuous glucose monitor? No   16. Do you have artificial joints? No   17. Are you allergic  to latex? No   18. Is there any chance that you may be pregnant? No       Health Care Directive:  Patient does not have a Health Care Directive or Living Will: Discussed advance care planning with patient; however, patient declined at this time.    Status of Chronic Conditions:  See problem list for active medical problems.  Problems all longstanding and stable, except as noted/documented.  See ROS for pertinent symptoms related to these conditions.    She was started on lisinopril 1 month ago and it has been working well to improve her blood pressure.     Review of Systems  CONSTITUTIONAL: NEGATIVE for fever, chills, change in weight  INTEGUMENTARY/SKIN: NEGATIVE for worrisome rashes, moles or lesions  EYES: NEGATIVE for vision changes or irritation  ENT/MOUTH: NEGATIVE for ear, mouth and throat problems  RESP: NEGATIVE for significant cough or SOB  CV: NEGATIVE for chest pain, palpitations or peripheral edema  GI: NEGATIVE for nausea, abdominal pain, heartburn, or change in bowel habits  : NEGATIVE for frequency, dysuria, or hematuria  MUSCULOSKELETAL: +Left hip pain.   NEURO: NEGATIVE for weakness, dizziness or paresthesias  ENDOCRINE: NEGATIVE for temperature intolerance, skin/hair changes  HEME: NEGATIVE for bleeding problems  PSYCHIATRIC: NEGATIVE for changes in mood or affect    Patient Active Problem List    Diagnosis Date Noted     ACS (acute coronary syndrome) (H) 07/23/2019     Priority: Medium     Vertigo 10/27/2018     Priority: Medium     Cigarette smoker 10/27/2018     Priority: Medium     Benign essential hypertension 10/12/2017     Priority: Medium     CARDIOVASCULAR SCREENING; LDL GOAL LESS THAN 160 10/31/2010     Priority: Medium     Screening for cardiovascular condition 10/31/2010     Priority: Medium     Irregular menstrual bleeding 05/14/2010     Priority: Medium     External hemorrhoids with other complication 06/02/2008     Priority: Medium     External hemorrhoids      Priority: Medium      Problem list name updated by automated process. Provider to review       Hyperlipidemia 2002     Priority: Medium     Allergic rhinitis 2001     Priority: Medium     Problem list name updated by automated process. Provider to review        Past Medical History:   Diagnosis Date     Alcohol abuse 2013    treatment     Allergic rhinitis, cause unspecified     seasonal     ASCUS on Pap smear 7/30/10    HPV + 52 (high risk)     Esophageal reflux      External hemorrhoids without mention of complication      History of colposcopy with cervical biopsy 8/23/10    bx- cervicitis.  no dysplasia noted     Other chest pain     atypical, normal stress testing     Pure hypercholesterolemia     total 220     Past Surgical History:   Procedure Laterality Date     COLONOSCOPY  02/01/10    with snare polypectomy     CV HEART CATHETERIZATION WITH POSSIBLE INTERVENTION N/A 2019    Procedure: Heart Catheterization with Possible Intervention;  Surgeon: Radhames Tavarez MD;  Location:  HEART CARDIAC CATH LAB     HC CARDIAC STRESS TST,COMPLETE      normal per pt     HC HEMORRHOIDECTOMY,INT/EXT,SIMPLE  02/18/10     ZZC  DELIVERY ONLY      , Low Cervical     Current Outpatient Medications   Medication Sig Dispense Refill     aspirin (ASA) 81 MG chewable tablet Take 81 mg by mouth daily       atorvastatin (LIPITOR) 40 MG tablet Take 1 tablet (40 mg) by mouth daily 90 tablet 3     fish oil-omega-3 fatty acids 1000 MG capsule Take 2 g by mouth 2 times daily as needed       fluticasone (FLONASE) 50 MCG/ACT nasal spray Spray 2 sprays into both nostrils daily as needed for rhinitis or allergies       lisinopril (ZESTRIL) 20 MG tablet Take 1 tablet (20 mg) by mouth daily 90 tablet 3       Allergies   Allergen Reactions     Iodine         Social History     Tobacco Use     Smoking status: Former     Packs/day: 0.50     Years: 6.00     Pack years: 3.00     Types: Cigarettes     Quit date:  "2020     Years since quittin.6     Smokeless tobacco: Never   Substance Use Topics     Alcohol use: Not Currently     Alcohol/week: 6.0 standard drinks     Types: 6 Glasses of wine per week       History   Drug Use No         Objective     /85 (BP Location: Right arm, Patient Position: Sitting, Cuff Size: Adult Regular)   Pulse 70   Temp 97.5  F (36.4  C) (Temporal)   Ht 1.585 m (5' 2.4\")   Wt 70.3 kg (155 lb)   SpO2 97%   BMI 27.99 kg/m      Physical Exam    GENERAL APPEARANCE: healthy, alert and no distress     EYES: EOMI, PERRL     HENT: ear canals and TM's normal and nose and mouth without ulcers or lesions     NECK: no adenopathy, no asymmetry, masses, or scars and thyroid normal to palpation     RESP: lungs clear to auscultation - no rales, rhonchi or wheezes     CV: regular rate and rhythm, normal S1 S2, no S3 or S4 and no murmur, click or rub     ABDOMEN:  soft, nontender, no HSM or masses and bowel sounds normal     MS: extremities normal- no gross deformities noted, no evidence of inflammation in joints, FROM in all extremities.     SKIN: no suspicious lesions or rashes     NEURO: Normal strength and tone, sensory exam grossly normal, mentation intact and speech normal. Gait is stable.     PSYCH: mentation appears normal. and affect normal/bright     LYMPHATICS: No cervical adenopathy    Recent Labs   Lab Test 10/27/22  1713   *   POTASSIUM 4.0   CR 0.64        Diagnostics:  Results for orders placed or performed in visit on 22   CBC with platelets     Status: Normal   Result Value Ref Range    WBC Count 6.4 4.0 - 11.0 10e3/uL    RBC Count 4.46 3.80 - 5.20 10e6/uL    Hemoglobin 13.6 11.7 - 15.7 g/dL    Hematocrit 41.2 35.0 - 47.0 %    MCV 92 78 - 100 fL    MCH 30.5 26.5 - 33.0 pg    MCHC 33.0 31.5 - 36.5 g/dL    RDW 12.0 10.0 - 15.0 %    Platelet Count 355 150 - 450 10e3/uL   Basic metabolic panel  (Ca, Cl, CO2, Creat, Gluc, K, Na, BUN)     Status: Abnormal   Result Value " Ref Range    Sodium 131 (L) 133 - 144 mmol/L    Potassium 4.1 3.4 - 5.3 mmol/L    Chloride 97 94 - 109 mmol/L    Carbon Dioxide (CO2) 29 20 - 32 mmol/L    Anion Gap 5 3 - 14 mmol/L    Urea Nitrogen 13 7 - 30 mg/dL    Creatinine 0.62 0.52 - 1.04 mg/dL    Calcium 8.7 8.5 - 10.1 mg/dL    Glucose 99 70 - 99 mg/dL    GFR Estimate >90 >60 mL/min/1.73m2       EKG: NSR, normal axis, low voltage in precordial leads, poor R wave progression, normal intervals, no acute ST/T changes c/w ischemia, no LVH by voltage criteria, unchanged from previous tracings    Revised Cardiac Risk Index (RCRI):  The patient has the following serious cardiovascular risks for perioperative complications:   - Coronary Artery Disease (MI, positive stress test, angina, Qs on EKG) = 1 point   - Cerebrovascular Disease (TIA or CVA) = 1 point     RCRI Interpretation: 2 points: Class III (moderate risk - 6.6% complication rate)     Estimated Functional Capacity: Performs 4 METS exercise without symptoms (e.g., light housework, stairs, 4 mph walk, 7 mph bike, slow step dance)           Signed Electronically by: Broderick Siu PA-C  Copy of this evaluation report is provided to requesting physician.

## 2022-12-09 ENCOUNTER — TELEPHONE (OUTPATIENT)
Dept: FAMILY MEDICINE | Facility: OTHER | Age: 62
End: 2022-12-09

## 2022-12-22 ENCOUNTER — TRANSFERRED RECORDS (OUTPATIENT)
Dept: HEALTH INFORMATION MANAGEMENT | Facility: CLINIC | Age: 62
End: 2022-12-22

## 2023-01-05 ENCOUNTER — TRANSFERRED RECORDS (OUTPATIENT)
Dept: HEALTH INFORMATION MANAGEMENT | Facility: CLINIC | Age: 63
End: 2023-01-05

## 2023-02-18 ENCOUNTER — HEALTH MAINTENANCE LETTER (OUTPATIENT)
Age: 63
End: 2023-02-18

## 2023-02-23 NOTE — Clinical Note
Patient called back, triaged and ran the COVID-19 protocol for Paxlovid    Lver enzyme test was elevated at last check so could not proceed with protocol    tried to schedule patient a virtual covid-19 treatment visit with provider, then patient disclosed he is not in [place], is currently in [place]     Pt asking how he can get the Paxlovid, advised he'd need to be seen by a provider in [place], we cannot do visits out of state. Pt states there is a [place] provider who'd be willing to Rx the Paxlovid for him, but wants him to check with PCP to verify that there are no contraindications for him first. Asking for PCP's opinion on this    Please advise    Felipa REGALADO Triage RN  [facility]       RN COVID TREATMENT VISIT  02/23/23    The patient has been triaged and does not require a higher level of care.    Leonard Garza  88 year old  Current weight? 187 lb     Has the patient been seen by a primary care provider at an [facility] or [facility] within the past two years? Yes.   Have you been in close proximity to/do you have a known exposure to a person with a confirmed case of influenza? No.     General treatment eligibility:  Date of positive COVID test (PCR or at home)?  2/22/23    Are you or have you been hospitalized for this COVID-19 infection? No.   Have you received monoclonal antibodies or antiviral treatment for COVID-19 since this positive test? No.   Do you have any of the following conditions that place you at risk of being very sick from COVID-19?   - Age 50 years or older  Yes, patient has at least one high risk condition as noted above.     Current COVID symptoms:   - cough  - sore throat  Yes. Patient has at least one symptom as selected.     How many days since symptoms started? 5 days or less. Established patient, 12 years or older weighing at least 88.2 lbs, who has symptoms that started in the past 5 days, has not been hospitalized  Procedure is scheduled in [facility].   nor received treatment already, and is at risk for being very sick from COVID-19.     Treatment eligibility by RN:    Are you currently pregnant or nursing? No    Do you have a clinically significant hypersensitivity to nirmatrelvir or ritonavir, or toxic epidermal necrolysis (TEN) or Castro-Tal Syndrome? No    Do you have a history of hepatitis, any hepatic impairment on the Problem List (such as Child-Duarte Class C, cirrhosis, fatty liver disease, alcoholic liver disease), or was the last liver lab (hepatic panel, ALT, AST, ALK Phos, bilirubin) elevated in the past 6 months? YES    Do you have any history of severe renal impairment (eGFR < 30mL/min)? No    Is patient eligible to continue? No, patient does not meet all eligibility requirements for the RN COVID treatment (as denoted by yes response(s) above). Patient informed they will need a virtual provider visit to assess treatment options.  Patient will be transferred to a  at the end of this call.   Felipa Blevins RN                Additional Information    Negative: SEVERE difficulty breathing (e.g., struggling for each breath, speaks in single words)    Negative: Difficult to awaken or acting confused (e.g., disoriented, slurred speech)    Negative: Bluish (or gray) lips or face now    Negative: Shock suspected (e.g., cold/pale/clammy skin, too weak to stand, low BP, rapid pulse)    Negative: Sounds like a life-threatening emergency to the triager    Negative: [1] Diagnosed or suspected COVID-19 AND [2] symptoms lasting 3 or more weeks    Negative: [1] COVID-19 exposure AND [2] no symptoms    Negative: COVID-19 vaccine reaction suspected (e.g., fever, headache, muscle aches) occurring 1 to 3 days after getting vaccine    Negative: COVID-19 vaccine, questions about    Negative: [1] Lives with someone known to have influenza (flu test positive) AND [2] flu-like symptoms (e.g., cough, runny nose, sore throat, SOB; with or without fever)     Negative: [1] Adult with possible COVID-19 symptoms AND [2] triager concerned about severity of symptoms or other causes    Negative: COVID-19 and breastfeeding, questions about    Negative: SEVERE or constant chest pain or pressure  (Exception: Mild central chest pain, present only when coughing.)    Negative: MODERATE difficulty breathing (e.g., speaks in phrases, SOB even at rest, pulse 100-120)    Negative: Headache and stiff neck (can't touch chin to chest)    Negative: Oxygen level (e.g., pulse oximetry) 90 percent or lower    Negative: Chest pain or pressure  (Exception: MILD central chest pain, present only when coughing)    Negative: Patient sounds very sick or weak to the triager    Protocols used: CORONAVIRUS (COVID-19) DIAGNOSED OR [id number]-OH

## 2023-09-13 ENCOUNTER — PATIENT OUTREACH (OUTPATIENT)
Dept: CARE COORDINATION | Facility: CLINIC | Age: 63
End: 2023-09-13
Payer: COMMERCIAL

## 2023-10-02 DIAGNOSIS — I10 BENIGN ESSENTIAL HYPERTENSION: ICD-10-CM

## 2023-10-02 RX ORDER — LISINOPRIL 20 MG/1
20 TABLET ORAL DAILY
Qty: 90 TABLET | Refills: 3 | Status: SHIPPED | OUTPATIENT
Start: 2023-10-02 | End: 2024-06-06

## 2023-10-10 ENCOUNTER — MYC MEDICAL ADVICE (OUTPATIENT)
Dept: FAMILY MEDICINE | Facility: OTHER | Age: 63
End: 2023-10-10
Payer: COMMERCIAL

## 2023-10-10 DIAGNOSIS — I21.4 NSTEMI (NON-ST ELEVATED MYOCARDIAL INFARCTION) (H): ICD-10-CM

## 2023-10-10 RX ORDER — ATORVASTATIN CALCIUM 40 MG/1
40 TABLET, FILM COATED ORAL DAILY
Qty: 60 TABLET | Refills: 0 | Status: SHIPPED | OUTPATIENT
Start: 2023-10-10 | End: 2024-01-22

## 2023-10-11 ENCOUNTER — PATIENT OUTREACH (OUTPATIENT)
Dept: CARE COORDINATION | Facility: CLINIC | Age: 63
End: 2023-10-11
Payer: COMMERCIAL

## 2024-01-06 ENCOUNTER — HEALTH MAINTENANCE LETTER (OUTPATIENT)
Age: 64
End: 2024-01-06

## 2024-01-21 DIAGNOSIS — I21.4 NSTEMI (NON-ST ELEVATED MYOCARDIAL INFARCTION) (H): ICD-10-CM

## 2024-01-22 RX ORDER — ATORVASTATIN CALCIUM 40 MG/1
40 TABLET, FILM COATED ORAL DAILY
Qty: 90 TABLET | Refills: 0 | Status: SHIPPED | OUTPATIENT
Start: 2024-01-22 | End: 2024-04-24

## 2024-01-23 ENCOUNTER — ANCILLARY PROCEDURE (OUTPATIENT)
Dept: MAMMOGRAPHY | Facility: OTHER | Age: 64
End: 2024-01-23
Attending: PHYSICIAN ASSISTANT
Payer: COMMERCIAL

## 2024-01-23 DIAGNOSIS — Z12.31 VISIT FOR SCREENING MAMMOGRAM: ICD-10-CM

## 2024-01-23 PROCEDURE — 77063 BREAST TOMOSYNTHESIS BI: CPT | Mod: TC | Performed by: RADIOLOGY

## 2024-01-23 PROCEDURE — 77067 SCR MAMMO BI INCL CAD: CPT | Mod: TC | Performed by: RADIOLOGY

## 2024-03-16 ENCOUNTER — HEALTH MAINTENANCE LETTER (OUTPATIENT)
Age: 64
End: 2024-03-16

## 2024-04-24 DIAGNOSIS — I21.4 NSTEMI (NON-ST ELEVATED MYOCARDIAL INFARCTION) (H): ICD-10-CM

## 2024-04-24 RX ORDER — ATORVASTATIN CALCIUM 40 MG/1
40 TABLET, FILM COATED ORAL DAILY
Qty: 90 TABLET | Refills: 0 | Status: SHIPPED | OUTPATIENT
Start: 2024-04-24 | End: 2024-06-06

## 2024-05-08 ENCOUNTER — NURSE TRIAGE (OUTPATIENT)
Dept: FAMILY MEDICINE | Facility: OTHER | Age: 64
End: 2024-05-08
Payer: COMMERCIAL

## 2024-05-08 NOTE — TELEPHONE ENCOUNTER
Pt calling back cannot figure out how to send a mychart     Evisit suggested    Patient verbalized understanding and in agreement with plan of care.         Renetta Ruiz RN

## 2024-05-08 NOTE — TELEPHONE ENCOUNTER
Patient calling with tick bite    Pt noticed tick in her abdominal area and states it was embedded. She states there is redness in the area.     Pt states she does not believe it was attached for 24 hours.     RN advised pt to send in evisit or mychart of reddened area.     Patient verbalized understanding and in agreement with plan of care.     Renetta Ruiz RN    Reason for Disposition   Red or very tender (to touch) area and started over 24 hours after the bite    Additional Information   Negative: Not a tick bite   Negative: Patient sounds very sick or weak to the triager   Negative: Fever or severe headache occurs, 2 to 14 days following the bite   Negative: Widespread rash occurs, 2 to 14 days following the bite   Negative: Can't remove live tick (after using Care Advice)   Negative: Fever and spreading red area or streak   Negative: Fever and area is very tender to touch   Negative: Red streak or red line and length > 2 inches (5 cm)    Protocols used: Tick Bite-A-OH

## 2024-05-26 NOTE — TELEPHONE ENCOUNTER
Pending Prescriptions:                       Disp   Refills    lisinopril (PRINIVIL/ZESTRIL) 10 MG tablet 30 tab*0        Sig: TAKE 1 TABLET BY MOUTH EVERY DAY    Routing refill request to provider for review/approval because:  Evita given x1 and patient did not follow up, please advise    Italia Diaz, TYESHAN, RN, PHN           (V5) oriented

## 2024-06-04 SDOH — HEALTH STABILITY: PHYSICAL HEALTH: ON AVERAGE, HOW MANY DAYS PER WEEK DO YOU ENGAGE IN MODERATE TO STRENUOUS EXERCISE (LIKE A BRISK WALK)?: 5 DAYS

## 2024-06-04 SDOH — HEALTH STABILITY: PHYSICAL HEALTH: ON AVERAGE, HOW MANY MINUTES DO YOU ENGAGE IN EXERCISE AT THIS LEVEL?: 40 MIN

## 2024-06-04 ASSESSMENT — SOCIAL DETERMINANTS OF HEALTH (SDOH): HOW OFTEN DO YOU GET TOGETHER WITH FRIENDS OR RELATIVES?: ONCE A WEEK

## 2024-06-06 ENCOUNTER — OFFICE VISIT (OUTPATIENT)
Dept: FAMILY MEDICINE | Facility: OTHER | Age: 64
End: 2024-06-06
Payer: COMMERCIAL

## 2024-06-06 VITALS
RESPIRATION RATE: 16 BRPM | HEIGHT: 63 IN | HEART RATE: 71 BPM | OXYGEN SATURATION: 97 % | SYSTOLIC BLOOD PRESSURE: 124 MMHG | DIASTOLIC BLOOD PRESSURE: 80 MMHG | WEIGHT: 155.5 LBS | BODY MASS INDEX: 27.55 KG/M2 | TEMPERATURE: 97.9 F

## 2024-06-06 DIAGNOSIS — D12.6 TUBULAR ADENOMA OF COLON: ICD-10-CM

## 2024-06-06 DIAGNOSIS — Z12.11 SCREEN FOR COLON CANCER: ICD-10-CM

## 2024-06-06 DIAGNOSIS — Z12.4 CERVICAL CANCER SCREENING: ICD-10-CM

## 2024-06-06 DIAGNOSIS — I25.2 HX OF NON-ST ELEVATION MYOCARDIAL INFARCTION (NSTEMI): ICD-10-CM

## 2024-06-06 DIAGNOSIS — I10 BENIGN ESSENTIAL HYPERTENSION: ICD-10-CM

## 2024-06-06 DIAGNOSIS — E78.5 HYPERLIPIDEMIA LDL GOAL <70: ICD-10-CM

## 2024-06-06 DIAGNOSIS — I21.4 NSTEMI (NON-ST ELEVATED MYOCARDIAL INFARCTION) (H): ICD-10-CM

## 2024-06-06 DIAGNOSIS — Z00.00 ENCOUNTER FOR ROUTINE ADULT HEALTH EXAMINATION WITHOUT ABNORMAL FINDINGS: Primary | ICD-10-CM

## 2024-06-06 PROCEDURE — 99396 PREV VISIT EST AGE 40-64: CPT | Mod: 25 | Performed by: PHYSICIAN ASSISTANT

## 2024-06-06 PROCEDURE — 99213 OFFICE O/P EST LOW 20 MIN: CPT | Mod: 25 | Performed by: PHYSICIAN ASSISTANT

## 2024-06-06 RX ORDER — ATORVASTATIN CALCIUM 40 MG/1
40 TABLET, FILM COATED ORAL DAILY
Qty: 90 TABLET | Refills: 3 | Status: SHIPPED | OUTPATIENT
Start: 2024-06-06

## 2024-06-06 RX ORDER — LISINOPRIL 20 MG/1
20 TABLET ORAL DAILY
Qty: 90 TABLET | Refills: 3 | Status: SHIPPED | OUTPATIENT
Start: 2024-06-06

## 2024-06-06 ASSESSMENT — PAIN SCALES - GENERAL: PAINLEVEL: NO PAIN (0)

## 2024-06-06 NOTE — PATIENT INSTRUCTIONS
"Will place a referral for a PAP and colonoscopy.  Preventive Care Advice   This is general advice we often give to help people stay healthy. Your care team may have specific advice just for you. Please talk to your care team about your own preventive care needs.  Lifestyle  Exercise at least 150 minutes each week (30 minutes a day, 5 days a week).  Do muscle strengthening activities 2 days a week. These help control your weight and prevent disease.  No smoking.  Wear sunscreen to prevent skin cancer.  Have your home tested for radon every 2 to 5 years. Radon is a colorless, odorless gas that can harm your lungs. To learn more, go to www.health.[facility].[place].us and search for \"Radon in Homes.\"  Keep guns unloaded and locked up in a safe place like a safe or gun vault, or, use a gun lock and hide the keys. Always lock away bullets separately. To learn more, visit [place] and search for \"safe gun storage.\"  Nutrition  Eat 5 or more servings of fruits and vegetables each day.  Try wheat bread, brown rice and whole grain pasta (instead of white bread, rice, and pasta).  Get enough calcium and vitamin D. Check the label on foods and aim for 100% of the RDA (recommended daily allowance).  Regular exams  Have a dental exam and cleaning every 6 months.  See your health care team every year to talk about:  Any changes in your health.  Any medicines your care team has prescribed.  Preventive care, family planning, and ways to prevent chronic diseases.  Shots (vaccines)   HPV shots (up to age 26), if you've never had them before.  Hepatitis B shots (up to age 59), if you've never had them before.  COVID-19 shot: Get this shot when it's due.  Flu shot: Get a flu shot every year.  Tetanus shot: Get a tetanus shot every 10 years.  Pneumococcal, hepatitis A, and RSV shots: Ask your care team if you need these based on your risk.  Shingles shot (for age 50 and up).  General health tests  Diabetes screening:  Starting at age 35, Get " screened for diabetes at least every 3 years.  If you are younger than age 35, ask your care team if you should be screened for diabetes.  Cholesterol test: At age 39, start having a cholesterol test every 5 years, or more often if advised.  Bone density scan (DEXA): At age 50, ask your care team if you should have this scan for osteoporosis (brittle bones).  Hepatitis C: Get tested at least once in your life.  Abdominal aortic aneurysm screening: Talk to your doctor about having this screening if you:  Have ever smoked; and  Are biologically male; and  Are between the ages of 65 and 75.  STIs (sexually transmitted infections)  Before age 24: Ask your care team if you should be screened for STIs.  After age 24: Get screened for STIs if you're at risk. You are at risk for STIs (including HIV) if:  You are sexually active with more than one person.  You don't use condoms every time.  You or a partner was diagnosed with a sexually transmitted infection.  If you are at risk for HIV, ask about PrEP medicine to prevent HIV.  Get tested for HIV at least once in your life, whether you are at risk for HIV or not.  Cancer screening tests  Cervical cancer screening: If you have a cervix, begin getting regular cervical cancer screening tests at age 21. Most people who have regular screenings with normal results can stop after age 65. Talk about this with your provider.  Breast cancer scan (mammogram): If you've ever had breasts, begin having regular mammograms starting at age 40. This is a scan to check for breast cancer.  Colon cancer screening: It is important to start screening for colon cancer at age 45.  Have a colonoscopy test every 10 years (or more often if you're at risk) Or, ask your provider about stool tests like a FIT test every year or Cologuard test every 3 years.  To learn more about your testing options, visit: www.[employer]/740546.pdf.  For help making a decision, visit: veronika/yz09275.  Prostate cancer  screening test: If you have a prostate and are age 55 to 69, ask your provider if you would benefit from a yearly prostate cancer screening test.  Lung cancer screening: If you are a current or former smoker age 50 to 80, ask your care team if ongoing lung cancer screenings are right for you.  For informational purposes only. Not to replace the advice of your health care provider. Copyright   2023 [facility]. All rights reserved. Clinically reviewed by the [facility]. [employer] 975413 - REV 04/24.

## 2024-06-06 NOTE — PROGRESS NOTES
"Preventive Care Visit  [facility]  Broderick Siu PA-C, Family Medicine  Jun 6, 2024    Assessment & Plan       ICD-10-CM    1. Encounter for routine adult health examination without abnormal findings  Z00.00       2. Hyperlipidemia LDL goal <70  E78.5 Lipid panel reflex to direct LDL Fasting      3. NSTEMI (non-ST elevated myocardial infarction) (H)  I21.4 atorvastatin (LIPITOR) 40 MG tablet      4. Hx of non-ST elevation myocardial infarction (NSTEMI)  I25.2       5. Benign essential hypertension  I10 Basic metabolic panel  (Ca, Cl, CO2, Creat, Gluc, K, Na, BUN)     lisinopril (ZESTRIL) 20 MG tablet      6. Cervical cancer screening  Z12.4 Ob/Gyn  Referral      7. Screen for colon cancer  Z12.11 Colonoscopy Screening  Referral      8. Tubular adenoma of colon  D12.6 Colonoscopy Screening  Referral          2-5. Continue Lipitor and lisinopril. Updated fasting labs to be completed ovder the next week.    6. Gynecology referral placed to update PAP.    7-8. Updated colonoscopy ordered.    Patient has been advised of split billing requirements and indicates understanding: Yes      BMI  Estimated body mass index is 27.9 kg/m  as calculated from the following:    Height as of this encounter: 1.59 m (5' 2.6\").    Weight as of this encounter: 70.5 kg (155 lb 8 oz).   Weight management plan: Discussed healthy diet and exercise guidelines    Counseling  Appropriate preventive services were discussed with this patient, including applicable screening as appropriate for fall prevention, nutrition, physical activity, Tobacco-use cessation, weight loss and cognition.  Checklist reviewing preventive services available has been given to the patient.  Reviewed patient's diet, addressing concerns and/or questions.   She is at risk for psychosocial distress and has been provided with information to reduce risk.       Juan Marte is a 63 year old, presenting for the " following:  Physical        6/6/2024     4:16 PM   Additional Questions   Roomed by Evette HUGHES    No vaccines per patient    Health Care Directive  Patient does not have a Health Care Directive or Living Will: Discussed advance care planning with patient; information given to patient to review.    HPI    She is overall doing well with no new concerns.        6/4/2024   General Health   How would you rate your overall physical health? Good   Feel stress (tense, anxious, or unable to sleep) Only a little   (!) STRESS CONCERN      6/4/2024   Nutrition   Three or more servings of calcium each day? Yes   Diet: Regular (no restrictions)   How many servings of fruit and vegetables per day? (!) 2-3   How many sweetened beverages each day? 0-1         6/4/2024   Exercise   Days per week of moderate/strenous exercise 5 days   Average minutes spent exercising at this level 40 min         6/4/2024   Social Factors   Frequency of gathering with friends or relatives Once a week   Worry food won't last until get money to buy more No   Food not last or not have enough money for food? No   Do you have housing?  Yes   Are you worried about losing your housing? No   Lack of transportation? No   Unable to get utilities (heat,electricity)? No         6/4/2024   Fall Risk   Fallen 2 or more times in the past year? No   Trouble with walking or balance? No          6/4/2024   Dental   Dentist two times every year? Yes         6/4/2024   TB Screening   Were you born outside of the [place]? No         Today's PHQ-2 Score:       6/6/2024     4:07 PM   PHQ-2 ( 1999 [employer])   Q1: Little interest or pleasure in doing things 0   Q2: Feeling down, depressed or hopeless 0   PHQ-2 Score 0   Q1: Little interest or pleasure in doing things Not at all   Q2: Feeling down, depressed or hopeless Not at all   PHQ-2 Score 0           6/4/2024   Substance Use   Alcohol more than 3/day or more than 7/wk No   Do you use any other substances recreationally? No      Social History     Tobacco Use    Smoking status: Former     Current packs/day: 0.00     Average packs/day: 0.5 packs/day for 6.0 years (3.0 ttl pk-yrs)     Types: Cigarettes     Start date: 2014     Quit date: 2020     Years since quittin.1    Smokeless tobacco: Former   Vaping Use    Vaping status: Never Used   Substance Use Topics    Alcohol use: Yes     Alcohol/week: 6.0 standard drinks of alcohol     Types: 6 Glasses of wine per week    Drug use: No           2024   LAST FHS-7 RESULTS   1st degree relative breast or ovarian cancer No   Any relative bilateral breast cancer No   Any male have breast cancer No   Any ONE woman have BOTH breast AND ovarian cancer No   Any woman with breast cancer before 50yrs No   2 or more relatives with breast AND/OR ovarian cancer Yes   2 or more relatives with breast AND/OR bowel cancer No     Mammogram Screening - Mammogram every 1-2 years updated in Health Maintenance based on mutual decision making        2024   STI Screening   New sexual partner(s) since last STI/HIV test? No     History of abnormal Pap smear: YES - other categories - see link Cervical Cytology Screening Guidelines        Latest Ref Rng & Units 10/6/2020    12:00 AM 2018     9:15 AM 2018     9:04 AM   PAP / HPV   PAP (Historical)    NIL    HPV 16 DNA NEG^Negative  Negative     HPV 18 DNA NEG^Negative  Negative     Other HR HPV NEG^Negative  Negative     PAP-ABSTRACT  See Scanned Document             This result is from an external source.     ASCVD Risk   The ASCVD Risk score (Jorge BARRETT, et al., 2019) failed to calculate for the following reasons:    The patient has a prior MI or stroke diagnosis    Reviewed and updated as needed this visit by Provider   Tobacco  Allergies  Meds  Problems  Med Hx  Surg Hx  Fam Hx              Review of Systems  Constitutional, HEENT, cardiovascular, pulmonary, GI, , musculoskeletal, neuro, skin, endocrine and psych  "systems are negative, except as otherwise noted.     Objective    Exam  /80   Pulse 71   Temp 97.9  F (36.6  C) (Temporal)   Resp 16   Ht 1.59 m (5' 2.6\")   Wt 70.5 kg (155 lb 8 oz)   LMP  (LMP Unknown)   SpO2 97%   BMI 27.90 kg/m     Estimated body mass index is 27.9 kg/m  as calculated from the following:    Height as of this encounter: 1.59 m (5' 2.6\").    Weight as of this encounter: 70.5 kg (155 lb 8 oz).    Physical Exam  GENERAL: alert and no distress  EYES: Eyes grossly normal to inspection, PERRL and conjunctivae and sclerae normal  HENT: ear canals and TM's normal, nose and mouth without ulcers or lesions  NECK: no adenopathy, no asymmetry, masses, or scars  RESP: lungs clear to auscultation - no rales, rhonchi or wheezes  CV: regular rate and rhythm, normal S1 S2, no S3 or S4, no murmur, click or rub, no peripheral edema  ABDOMEN: soft, nontender, no hepatosplenomegaly, no masses and bowel sounds normal  MS: no gross musculoskeletal defects noted, no edema  SKIN: no suspicious lesions or rashes  NEURO: Normal strength and tone, mentation intact and speech normal. Gait is stable.   PSYCH: mentation appears normal, affect normal/bright    Signed Electronically by: Broderick Siu PA-C    "

## 2024-06-07 ENCOUNTER — TELEPHONE (OUTPATIENT)
Dept: GASTROENTEROLOGY | Facility: CLINIC | Age: 64
End: 2024-06-07
Payer: COMMERCIAL

## 2024-06-07 DIAGNOSIS — Z12.11 SPECIAL SCREENING FOR MALIGNANT NEOPLASMS, COLON: ICD-10-CM

## 2024-06-07 DIAGNOSIS — Z86.0100 HISTORY OF COLONIC POLYPS: Primary | ICD-10-CM

## 2024-06-07 NOTE — TELEPHONE ENCOUNTER
"Endoscopy Scheduling Screen    Have you had a positive Covid test in the last 14 days?  No    What is your communication preference for Instructions and/or Bowel Prep?   MyChart    What insurance is in the chart?  Other:  bcbs    Ordering/Referring Provider:     ANA NORTON      (If ordering provider performs procedure, schedule with ordering provider unless otherwise instructed. )    BMI: Estimated body mass index is 27.9 kg/m  as calculated from the following:    Height as of 6/6/24: 1.59 m (5' 2.6\").    Weight as of 6/6/24: 70.5 kg (155 lb 8 oz).     Sedation Ordered  moderate sedation.   If patient BMI > 50 do not schedule in [facility].    If patient BMI > 45 do not schedule at [facility].    Are you taking methadone or Suboxone?  No    Have you had difficulties, pain, or discomfort during past endoscopy procedures?  No    Are you taking any prescription medications for pain 3 or more times per week?   NO, No RN review required.    Do you have a history of malignant hyperthermia?  No    (Females) Are you currently pregnant?   No     Have you been diagnosed or told you have pulmonary hypertension?   No    Do you have an LVAD?  No    Have you been told you have moderate to severe sleep apnea?  No    Have you been told you have COPD, asthma, or any other lung disease?  No    Do you have any heart conditions?  No     Have you ever had or are you waiting for an organ transplant?  No. Continue scheduling, no site restrictions.    Have you had a stroke or transient ischemic attack (TIA aka \"mini stroke\" in the last 6 months?   No    Have you been diagnosed with or been told you have cirrhosis of the liver?   No    Are you currently on dialysis?   No    Do you need assistance transferring?   No    BMI: Estimated body mass index is 27.9 kg/m  as calculated from the following:    Height as of 6/6/24: 1.59 m (5' 2.6\").    Weight as of 6/6/24: 70.5 kg (155 lb 8 oz).     Is patients BMI > 40 and scheduling location " [facility]?  No    Do you take an injectable medication for weight loss or diabetes (excluding insulin)?  No    Do you take the medication Naltrexone?  No    Do you take blood thinners?  No       Prep   Are you currently on dialysis or do you have chronic kidney disease?  No    Do you have a diagnosis of diabetes?  No    Do you have a diagnosis of cystic fibrosis (CF)?  No    On a regular basis do you go 3 -5 days between bowel movements?  No    BMI > 40?  No    Preferred Pharmacy:      [facility] [id number] [facility] - [place], [place] - [address] [address]  [address] [facility] [address]  Phone: [phone] Fax: [phone]      Final Scheduling Details     Procedure scheduled  Colonoscopy    Surgeon:  Krupa     Date of procedure:  8/19/24     Pre-OP / PAC:   No - Not required for this site.    Location  [facility] - Per order.    Sedation   MAC/Deep Sedation  location      Patient Reminders:   You will receive a call from a Nurse to review instructions and health history.  This assessment must be completed prior to your procedure.  Failure to complete the Nurse assessment may result in the procedure being cancelled.      On the day of your procedure, please designate an adult(s) who can drive you home stay with you for the next 24 hours. The medicines used in the exam will make you sleepy. You will not be able to drive.      You cannot take public transportation, ride share services, or non-medical taxi service without a responsible caregiver.  Medical transport services are allowed with the requirement that a responsible caregiver will receive you at your destination.  We require that drivers and caregivers are confirmed prior to your procedure.

## 2024-06-12 ENCOUNTER — PATIENT OUTREACH (OUTPATIENT)
Dept: GASTROENTEROLOGY | Facility: CLINIC | Age: 64
End: 2024-06-12

## 2024-06-12 ENCOUNTER — LAB (OUTPATIENT)
Dept: LAB | Facility: OTHER | Age: 64
End: 2024-06-12
Payer: COMMERCIAL

## 2024-06-12 DIAGNOSIS — E78.5 HYPERLIPIDEMIA LDL GOAL <70: ICD-10-CM

## 2024-06-12 DIAGNOSIS — I10 BENIGN ESSENTIAL HYPERTENSION: ICD-10-CM

## 2024-06-12 LAB
ANION GAP SERPL CALCULATED.3IONS-SCNC: 9 MMOL/L (ref 7–15)
BUN SERPL-MCNC: 13.9 MG/DL (ref 8–23)
CALCIUM SERPL-MCNC: 9.2 MG/DL (ref 8.8–10.2)
CHLORIDE SERPL-SCNC: 95 MMOL/L (ref 98–107)
CHOLEST SERPL-MCNC: 181 MG/DL
CREAT SERPL-MCNC: 0.71 MG/DL (ref 0.51–0.95)
DEPRECATED HCO3 PLAS-SCNC: 28 MMOL/L (ref 22–29)
EGFRCR SERPLBLD CKD-EPI 2021: >90 ML/MIN/1.73M2
FASTING STATUS PATIENT QL REPORTED: YES
FASTING STATUS PATIENT QL REPORTED: YES
GLUCOSE SERPL-MCNC: 94 MG/DL (ref 70–99)
HDLC SERPL-MCNC: 89 MG/DL
LDLC SERPL CALC-MCNC: 83 MG/DL
NONHDLC SERPL-MCNC: 92 MG/DL
POTASSIUM SERPL-SCNC: 4.3 MMOL/L (ref 3.4–5.3)
SODIUM SERPL-SCNC: 132 MMOL/L (ref 135–145)
TRIGL SERPL-MCNC: 44 MG/DL

## 2024-06-12 PROCEDURE — 36415 COLL VENOUS BLD VENIPUNCTURE: CPT

## 2024-06-12 PROCEDURE — 80048 BASIC METABOLIC PNL TOTAL CA: CPT

## 2024-06-12 PROCEDURE — 80061 LIPID PANEL: CPT

## 2024-07-15 ENCOUNTER — PATIENT OUTREACH (OUTPATIENT)
Dept: CARE COORDINATION | Facility: CLINIC | Age: 64
End: 2024-07-15
Payer: COMMERCIAL

## 2024-07-25 NOTE — TELEPHONE ENCOUNTER
Standard Miralax Bowel Prep recommended due to standard bowel prep. Instructions were sent via [employer].

## 2024-08-19 ENCOUNTER — HOSPITAL ENCOUNTER (OUTPATIENT)
Facility: CLINIC | Age: 64
Discharge: HOME | End: 2024-08-19
Attending: SPECIALIST | Admitting: SPECIALIST
Payer: COMMERCIAL

## 2024-08-19 ENCOUNTER — ANESTHESIA (OUTPATIENT)
Dept: GASTROENTEROLOGY | Facility: CLINIC | Age: 64
End: 2024-08-19
Payer: COMMERCIAL

## 2024-08-19 ENCOUNTER — ANESTHESIA EVENT (OUTPATIENT)
Dept: GASTROENTEROLOGY | Facility: CLINIC | Age: 64
End: 2024-08-19
Payer: COMMERCIAL

## 2024-08-19 VITALS
RESPIRATION RATE: 16 BRPM | HEART RATE: 67 BPM | DIASTOLIC BLOOD PRESSURE: 87 MMHG | TEMPERATURE: 97.4 F | OXYGEN SATURATION: 100 % | SYSTOLIC BLOOD PRESSURE: 162 MMHG

## 2024-08-19 LAB — COLONOSCOPY: NORMAL

## 2024-08-19 PROCEDURE — 45380 COLONOSCOPY AND BIOPSY: CPT | Mod: PT | Performed by: SPECIALIST

## 2024-08-19 PROCEDURE — 370N000017 HC ANESTHESIA TECHNICAL FEE, PER MIN: Performed by: SPECIALIST

## 2024-08-19 PROCEDURE — 250N000011 HC RX IP 250 OP 636: Performed by: NURSE ANESTHETIST, CERTIFIED REGISTERED

## 2024-08-19 PROCEDURE — 250N000009 HC RX 250: Performed by: NURSE ANESTHETIST, CERTIFIED REGISTERED

## 2024-08-19 PROCEDURE — 88305 TISSUE EXAM BY PATHOLOGIST: CPT | Mod: TC | Performed by: SPECIALIST

## 2024-08-19 PROCEDURE — 88305 TISSUE EXAM BY PATHOLOGIST: CPT | Mod: 26 | Performed by: PATHOLOGY

## 2024-08-19 PROCEDURE — 258N000003 HC RX IP 258 OP 636: Performed by: NURSE ANESTHETIST, CERTIFIED REGISTERED

## 2024-08-19 RX ORDER — LIDOCAINE 40 MG/G
CREAM TOPICAL
Status: DISCONTINUED | OUTPATIENT
Start: 2024-08-19 | End: 2024-08-19 | Stop reason: HOSPADM

## 2024-08-19 RX ORDER — SODIUM CHLORIDE, SODIUM LACTATE, POTASSIUM CHLORIDE, CALCIUM CHLORIDE 600; 310; 30; 20 MG/100ML; MG/100ML; MG/100ML; MG/100ML
INJECTION, SOLUTION INTRAVENOUS CONTINUOUS
Status: DISCONTINUED | OUTPATIENT
Start: 2024-08-19 | End: 2024-08-19 | Stop reason: HOSPADM

## 2024-08-19 RX ORDER — LIDOCAINE HYDROCHLORIDE 20 MG/ML
INJECTION, SOLUTION INFILTRATION; PERINEURAL PRN
Status: DISCONTINUED | OUTPATIENT
Start: 2024-08-19 | End: 2024-08-19

## 2024-08-19 RX ORDER — PROPOFOL 10 MG/ML
INJECTION, EMULSION INTRAVENOUS CONTINUOUS PRN
Status: DISCONTINUED | OUTPATIENT
Start: 2024-08-19 | End: 2024-08-19

## 2024-08-19 RX ORDER — PROPOFOL 10 MG/ML
INJECTION, EMULSION INTRAVENOUS PRN
Status: DISCONTINUED | OUTPATIENT
Start: 2024-08-19 | End: 2024-08-19

## 2024-08-19 RX ADMIN — PROPOFOL 200 MCG/KG/MIN: 10 INJECTION, EMULSION INTRAVENOUS at 09:47

## 2024-08-19 RX ADMIN — LIDOCAINE HYDROCHLORIDE 50 MG: 20 INJECTION, SOLUTION INFILTRATION; PERINEURAL at 09:47

## 2024-08-19 RX ADMIN — SODIUM CHLORIDE, POTASSIUM CHLORIDE, SODIUM LACTATE AND CALCIUM CHLORIDE: 600; 310; 30; 20 INJECTION, SOLUTION INTRAVENOUS at 09:00

## 2024-08-19 RX ADMIN — PROPOFOL 100 MG: 10 INJECTION, EMULSION INTRAVENOUS at 09:47

## 2024-08-19 ASSESSMENT — ACTIVITIES OF DAILY LIVING (ADL)
ADLS_ACUITY_SCORE: 35
ADLS_ACUITY_SCORE: 35

## 2024-08-19 ASSESSMENT — LIFESTYLE VARIABLES: TOBACCO_USE: 1

## 2024-08-19 NOTE — ANESTHESIA CARE TRANSFER NOTE
Patient: Rosanna Ivory    Procedure: Procedure(s):  COLONOSCOPY, WITH POLYPECTOMY       Diagnosis: Screen for colon cancer [Z12.11]  History of colonic polyps [Z86.010]  Diagnosis Additional Information: No value filed.    Anesthesia Type:   MAC     Note:    Oropharynx: oropharynx clear of all foreign objects and spontaneously breathing  Level of Consciousness: awake  Oxygen Supplementation: room air    Independent Airway: airway patency satisfactory and stable  Dentition: dentition unchanged  Vital Signs Stable: post-procedure vital signs reviewed and stable  Report to RN Given: handoff report given  Patient transferred to: Phase II    Handoff Report: Identifed the Patient, Identified the Reponsible Provider, Reviewed the pertinent medical history, Discussed the surgical course, Reviewed Intra-OP anesthesia mangement and issues during anesthesia, Set expectations for post-procedure period and Allowed opportunity for questions and acknowledgement of understanding      Vitals:  Vitals Value Taken Time   /87 08/19/24 1022   Temp     Pulse 57 08/19/24 1022   Resp 16 08/19/24 1020   SpO2 100 % 08/19/24 1020   Vitals shown include unfiled device data.    Electronically Signed By: PEARL Junior CRNA  August 19, 2024  10:43 AM

## 2024-08-19 NOTE — DISCHARGE INSTRUCTIONS
[facility]    Home Care Following Endoscopy          Activity:  You have just undergone an endoscopic procedure performed with sedation.    Do not work or operate machinery (including a car) OR drink alcohol (including beer) OR sign legal papers for at least 12 hours.    I encourage you to walk and attempt to pass this air as soon as possible.    Diet:  Return to the diet you were on before your procedure but eat lightly for the first 12-24 hours.  Drink plenty of water.  Resume any regular medications unless otherwise advised by your physician.     You had 1 polyp removed so please refrain from aspirin or aspirin products for 2 days.     Pain:  You may take Tylenol as needed for pain.  Expected Recovery:  You can expect some mild abdominal fullness and/or discomfort due to the air used to inflate your intestinal tract.   I encourage you to walk and attempt to pass this air as soon as possible.    Call Your Physician if You Have:    After Colonoscopy:  Worsening persisting abdominal pain which is worse with activity.  Fevers (>101 degrees F), chills or shakes.  Passage of continued blood with bowel movements.     Any questions or concerns about your recovery, please call [phone] or after hours [facility] ([phone]) Nurse Advice Line.    Follow-up Care:  You did have 1 polyp removed.  The polyp will be sent to pathology.    You should receive letter in your My Chart from   with your results within 1-2 weeks.   Please call [phone] if you have not received a notification of your results.

## 2024-08-19 NOTE — ANESTHESIA PREPROCEDURE EVALUATION
Anesthesia Pre-Procedure Evaluation    Patient: Rosanna Ivory   MRN: [id number] : 1960        Procedure : Procedure(s):  Colonoscopy          Past Medical History:   Diagnosis Date    Alcohol abuse 2013    treatment    Allergic rhinitis, cause unspecified 2000    seasonal    ASCUS on Pap smear 2010    HPV + 52 (high risk)    Esophageal reflux     External hemorrhoids without mention of complication     History of colposcopy with cervical biopsy 2010    bx- cervicitis.  no dysplasia noted    Hypertension     Other chest pain 2000    atypical, normal stress testing    Pure hypercholesterolemia     total 220      Past Surgical History:   Procedure Laterality Date    ABDOMEN SURGERY      C section    COLONOSCOPY  2010    with snare polypectomy    CV HEART CATHETERIZATION WITH POSSIBLE INTERVENTION N/A 2019    Procedure: Heart Catheterization with Possible Intervention;  Surgeon: Radhames Tavarez MD;  Location:  HEART CARDIAC CATH LAB    HC CARDIAC STRESS TST,COMPLETE  2000    normal per pt    HC HEMORRHOIDECTOMY,INT/EXT,SIMPLE  2010    ZZC  DELIVERY ONLY  1996    , Low Cervical      Allergies   Allergen Reactions    Iodine       Social History     Tobacco Use    Smoking status: Former     Current packs/day: 0.00     Average packs/day: 0.5 packs/day for 6.0 years (3.0 ttl pk-yrs)     Types: Cigarettes     Start date: 2014     Quit date: 2020     Years since quittin.3    Smokeless tobacco: Former   Substance Use Topics    Alcohol use: Yes     Alcohol/week: 6.0 standard drinks of alcohol     Types: 6 Glasses of wine per week      Wt Readings from Last 1 Encounters:   24 70.5 kg (155 lb 8 oz)        Anesthesia Evaluation   Pt has had prior anesthetic. Type: General and MAC.    No history of anesthetic complications       ROS/MED HX  ENT/Pulmonary:     (+)                tobacco use, Past use,  3  Pack-Year  Hx,                      Neurologic:  - neg neurologic ROS     Cardiovascular: Comment: - Chest pain     (+)  hypertension- -   -  - -                                 Previous cardiac testing   Echo: Date: Results:    Stress Test:  Date: Results:    ECG Reviewed:  Date: 12/17/22 Results:  - NSR  Cath:  Date: Results:      METS/Exercise Tolerance:     Hematologic:  - neg hematologic  ROS     Musculoskeletal:  - neg musculoskeletal ROS     GI/Hepatic:     (+) GERD, Asymptomatic on medication,      bowel prep,            Renal/Genitourinary:  - neg Renal ROS     Endo:  - neg endo ROS     Psychiatric/Substance Use:     (+)   alcohol abuse      Infectious Disease:  - neg infectious disease ROS     Malignancy:  - neg malignancy ROS     Other:            Physical Exam    Airway        Mallampati: II   TM distance: > 3 FB   Neck ROM: full   Mouth opening: > 3 cm    Respiratory Devices and Support         Dental       (+) Minor Abnormalities - some fillings, tiny chips      Cardiovascular   cardiovascular exam normal          Pulmonary                   OUTSIDE LABS:  CBC:   Lab Results   Component Value Date    WBC 6.4 12/07/2022    WBC 5.5 07/23/2019    HGB 13.6 12/07/2022    HGB 13.6 07/23/2019    HCT 41.2 12/07/2022    HCT 38.7 07/23/2019     12/07/2022     07/23/2019     BMP:   Lab Results   Component Value Date     (L) 06/12/2024     (L) 12/07/2022    POTASSIUM 4.3 06/12/2024    POTASSIUM 4.1 12/07/2022    CHLORIDE 95 (L) 06/12/2024    CHLORIDE 97 12/07/2022    CO2 28 06/12/2024    CO2 29 12/07/2022    BUN 13.9 06/12/2024    BUN 13 12/07/2022    CR 0.71 06/12/2024    CR 0.62 12/07/2022    GLC 94 06/12/2024    GLC 99 12/07/2022     COAGS:   Lab Results   Component Value Date    INR 1.0 04/30/2020     POC:   Lab Results   Component Value Date     (H) 10/28/2018    HCG  08/30/2010     Negative   This test provides a presumptive diagnosis of pregnancy or non-pregnancy. A   confirmed  pregnancy diagnosis should only be made by a physician after all   clinical and laboratory findings have been evaluated.     HEPATIC:   Lab Results   Component Value Date    ALBUMIN 4.0 07/23/2019    PROTTOTAL 8.0 07/23/2019    ALT 23 03/16/2021    AST 28 07/23/2019    ALKPHOS 142 07/23/2019    BILITOTAL 1.0 07/23/2019     OTHER:   Lab Results   Component Value Date    A1C 5.4 04/30/2020    KAVEH 9.2 06/12/2024    LIPASE 138 07/23/2019    TSH 1.66 07/15/2010       Anesthesia Plan    ASA Status:  2    NPO Status:  NPO Appropriate    Anesthesia Type: MAC.     - Reason for MAC: immobility needed   Induction: Propofol, Intravenous.   Maintenance: TIVA.        Consents    Anesthesia Plan(s) and associated risks, benefits, and realistic alternatives discussed. Questions answered and patient/representative(s) expressed understanding.     - Discussed: Risks, Benefits and Alternatives for BOTH SEDATION and the PROCEDURE were discussed     - Discussed with:  Patient      - Extended Intubation/Ventilatory Support Discussed: No.      - Patient is DNR/DNI Status: No     Use of blood products discussed: No .     Postoperative Care            Comments:    Other Comments: The risks and benefits of anesthesia, and the alternatives where applicable, have been discussed with the patient, and they wish to proceed.              PEARL Junior CRNA    I have reviewed the pertinent notes and labs in the chart from the past 30 days and (re)examined the patient.  Any updates or changes from those notes are reflected in this note.

## 2024-08-19 NOTE — H&P
[facility] History and Physical    Rosanna Ivory MRN# [id number]   Age: 64 year old YOB: 1960     Date of Admission:  (Not on file)    Home clinic: [facility]  Primary care provider: Broderick Siu          Impression and Plan:   Impression:   Screen for colon cancer [Z12.11]  History of colonic polyps [Z86.010]  Last colonoscopy 2020 [facility].  polyps  Last colonoscopy 20 10-1 polyp      Plan:   Proceed to  Colonoscopy as planned.  The procedure, risks(bleeding, perforation), benefits and alternatives were discussed and the patient agrees to proceed. Cleared for Anesthesia             Chief Complaint:   Screen for colon cancer [Z12.11]  History of colonic polyps [Z86.010]    History is obtained from the patient          History of Present Illness:   This 64 year old female is being seen at this time for evaluation for colonoscopy.  No complaints or concerns.  No family hx           Past Medical History:     Past Medical History:   Diagnosis Date    Alcohol abuse 01/01/2013    treatment    Allergic rhinitis, cause unspecified 01/01/2000    seasonal    ASCUS on Pap smear 07/30/2010    HPV + 52 (high risk)    Esophageal reflux     External hemorrhoids without mention of complication     History of colposcopy with cervical biopsy 08/23/2010    bx- cervicitis.  no dysplasia noted    Hypertension     Other chest pain 01/01/2000    atypical, normal stress testing    Pure hypercholesterolemia     total 220            Past Surgical History:     Past Surgical History:   Procedure Laterality Date    ABDOMEN SURGERY  1996    C section    COLONOSCOPY  02/01/2010    with snare polypectomy    CV HEART CATHETERIZATION WITH POSSIBLE INTERVENTION N/A 07/24/2019    Procedure: Heart Catheterization with Possible Intervention;  Surgeon: Radhames Tavarez MD;  Location:  HEART CARDIAC CATH LAB    HC CARDIAC STRESS TST,COMPLETE  01/01/2000    normal per pt    HC  HEMORRHOIDECTOMY,INT/EXT,SIMPLE  2010    ZZC  DELIVERY ONLY  1996    , Low Cervical            Social History:     Social History     Tobacco Use    Smoking status: Former     Current packs/day: 0.00     Average packs/day: 0.5 packs/day for 6.0 years (3.0 ttl pk-yrs)     Types: Cigarettes     Start date: 2014     Quit date: 2020     Years since quittin.3    Smokeless tobacco: Former   Substance Use Topics    Alcohol use: Yes     Alcohol/week: 6.0 standard drinks of alcohol     Types: 6 Glasses of wine per week            Family History:     Family History   Problem Relation Age of Onset    Diabetes Maternal Grandmother     Hypertension Maternal Grandmother     Hypertension Father     Asthma Father     Gastrointestinal Disease Father         hiatal hernia    Diabetes Father         adult    Prostate Cancer Paternal Grandfather     Circulatory Paternal Grandfather         anyurism    Obesity Sister     Lipids Sister     Alzheimer Disease Mother     Breast Cancer Maternal Aunt         after menopause    Breast Cancer Paternal Aunt         after menopause    Asthma Son     C.A.D. No family hx of             Immunizations:     VACCINE/DOSE   Diptheria   DPT   DTAP   HBIG   Hepatitis A   Hepatitis B   HIB   Influenza   Measles   Meningococcal   MMR   Mumps   Pneumococcal   Polio   Rubella   Small Pox   TDAP   Varicella   Zoster            Allergies:     Allergies   Allergen Reactions    Iodine             Medications:     No current facility-administered medications for this encounter.     Current Outpatient Medications   Medication Sig Dispense Refill    aspirin (ASA) 81 MG chewable tablet Take 81 mg by mouth daily      atorvastatin (LIPITOR) 40 MG tablet Take 1 tablet (40 mg) by mouth daily 90 tablet 3    fish oil-omega-3 fatty acids 1000 MG capsule Take 2 g by mouth 2 times daily as needed      fluticasone (FLONASE) 50 MCG/ACT nasal spray Spray 2 sprays into both nostrils  daily as needed for rhinitis or allergies      lisinopril (ZESTRIL) 20 MG tablet Take 1 tablet (20 mg) by mouth daily 90 tablet 3             Review of Systems:   The review of systems was positive for the following findings.  None.  The remainder of the review of systems was unremarkable.          Physical Exam:   All vitals have been reviewed  There were no vitals taken for this visit.  No intake or output data in the 24 hours ending 08/18/24 2228  SHEENT examination revealed NC/AT, EOMI.  Examination of the chest revealed CTA.  Examination of the heart revealed RRR.  Examination of the abdomen revealed Soft, non tender.  The neuromuscular examination was NL.          Data:   All laboratory data reviewed  No results found for any visits on 08/19/24.  -     Melquiades Molina MD, FACS

## 2024-08-19 NOTE — ANESTHESIA POSTPROCEDURE EVALUATION
Patient: Rosanna Ivory    Procedure: Procedure(s):  COLONOSCOPY, WITH POLYPECTOMY       Anesthesia Type:  MAC    Note:  Disposition: Outpatient   Postop Pain Control: Uneventful            Sign Out: Well controlled pain   PONV: No   Neuro/Psych: Uneventful            Sign Out: Acceptable/Baseline neuro status   Airway/Respiratory: Uneventful            Sign Out: Acceptable/Baseline resp. status   CV/Hemodynamics: Uneventful            Sign Out: Acceptable CV status   Other NRE: NONE   DID A NON-ROUTINE EVENT OCCUR? No    Event details/Postop Comments:  Pt was happy with anesthesia care.  No complications.  I will follow up with the pt if needed.           Last vitals:  Vitals Value Taken Time   /87 08/19/24 1022   Temp     Pulse 57 08/19/24 1022   Resp 16 08/19/24 1020   SpO2 100 % 08/19/24 1020   Vitals shown include unfiled device data.    Electronically Signed By: PEARL Junior CRNA  August 19, 2024  10:43 AM

## 2024-08-21 LAB
PATH REPORT.COMMENTS IMP SPEC: NORMAL
PATH REPORT.COMMENTS IMP SPEC: NORMAL
PATH REPORT.FINAL DX SPEC: NORMAL
PATH REPORT.GROSS SPEC: NORMAL
PATH REPORT.MICROSCOPIC SPEC OTHER STN: NORMAL
PATH REPORT.RELEVANT HX SPEC: NORMAL
PHOTO IMAGE: NORMAL

## 2024-09-06 ENCOUNTER — OFFICE VISIT (OUTPATIENT)
Dept: OBGYN | Facility: OTHER | Age: 64
End: 2024-09-06
Payer: COMMERCIAL

## 2024-09-06 VITALS
SYSTOLIC BLOOD PRESSURE: 121 MMHG | BODY MASS INDEX: 27.48 KG/M2 | DIASTOLIC BLOOD PRESSURE: 66 MMHG | HEIGHT: 63 IN | WEIGHT: 155.1 LBS

## 2024-09-06 DIAGNOSIS — Z12.4 CERVICAL CANCER SCREENING: Primary | ICD-10-CM

## 2024-09-06 PROCEDURE — 99459 PELVIC EXAMINATION: CPT | Performed by: OBSTETRICS & GYNECOLOGY

## 2024-09-06 PROCEDURE — 87624 HPV HI-RISK TYP POOLED RSLT: CPT | Performed by: OBSTETRICS & GYNECOLOGY

## 2024-09-06 PROCEDURE — G0123 SCREEN CERV/VAG THIN LAYER: HCPCS | Performed by: OBSTETRICS & GYNECOLOGY

## 2024-09-06 PROCEDURE — 99202 OFFICE O/P NEW SF 15 MIN: CPT | Performed by: OBSTETRICS & GYNECOLOGY

## 2024-09-06 NOTE — PROGRESS NOTES
"Subjective  Rosanna Ivory is a 64 year old female who presents for the female portion of her annual exam.  Patient was seen by FATIMAH Reed on 2024 for her annual exam.  She no longer has a menses.  No problems urinating.  Normal bowel movements.  Patient is somewhat sexually active.  Some dyspareunia-dryness.  No vaginal spotting.  1 c section for breech and 2 VBACs.    Most recent pap:    History of abnormal Pap smear:  No  History of STI's:  No  History of PID:   No    ROS  ROS: 10 point ROS neg other than the symptoms noted above in the HPI.    Past Medical History:   Diagnosis Date    Alcohol abuse 2013    treatment    Allergic rhinitis, cause unspecified 2000    seasonal    ASCUS on Pap smear 2010    HPV + 52 (high risk)    Cerebral infarction (H)     Esophageal reflux     External hemorrhoids without mention of complication     History of colposcopy with cervical biopsy 2010    bx- cervicitis.  no dysplasia noted    Hypertension     Other chest pain 2000    atypical, normal stress testing    Pure hypercholesterolemia     total 220         Past Surgical History:   Procedure Laterality Date    ABDOMEN SURGERY      C section    COLONOSCOPY  2010    with snare polypectomy    COLONOSCOPY N/A 2024    Procedure: COLONOSCOPY, WITH POLYPECTOMY;  Surgeon: Melquiades Molina MD;  Location:  GI    CV HEART CATHETERIZATION WITH POSSIBLE INTERVENTION N/A 2019    Procedure: Heart Catheterization with Possible Intervention;  Surgeon: Radhames Tavarez MD;  Location:  HEART CARDIAC CATH LAB    HC CARDIAC STRESS TST,COMPLETE  2000    normal per pt    HC HEMORRHOIDECTOMY,INT/EXT,SIMPLE  2010    ZZC  DELIVERY ONLY  1996    , Low Cervical           Objective  Vitals: /66 (BP Location: Left arm, Cuff Size: Adult Regular)   Ht 1.59 m (5' 2.6\")   Wt 70.4 kg (155 lb 1.6 oz)   LMP  (LMP Unknown)   BMI 27.83 kg/m  "     BMI= Body mass index is 27.83 kg/m .    General appearance=well developed, well-nourished female  Gait=normal  Psych=mood is stable, alert and oriented x3  PELVIC:    External genitalia: normal without lesions or masses  Urethral meatus: no lesions or prolapse noted, normal size  Urethra: no masses, non tender  Bladder: non tender, no fullness  Vagina: normal mucosa and rugae, no discharge, no lesions  Cervix: normal without lesion, no cervical motion tenderness, healthy, nulliparous, pap smear obtained  Uterus: small, mobile, nontender.  Adnexa: non tender, without masses, no nodularity  Rectal: deferred  Ext=no clubbing or cyanosis, no swelling        Assessment/Plan  1.)  Pelvic portion of female exam=pap smear        20 minutes were spent on the date of the encounter doing chart review, history and exam, documentation, and further activities as noted above.        Shakira Justin DO

## 2024-09-06 NOTE — PATIENT INSTRUCTIONS
Please call if you any questions.    [address]   [address]  [phone]        Shakira Justin,

## 2024-09-09 LAB
HPV HR 12 DNA CVX QL NAA+PROBE: NEGATIVE
HPV16 DNA CVX QL NAA+PROBE: NEGATIVE
HPV18 DNA CVX QL NAA+PROBE: NEGATIVE
HUMAN PAPILLOMA VIRUS FINAL DIAGNOSIS: NORMAL

## 2024-09-12 LAB
BKR AP ASSOCIATED HPV REPORT: NORMAL
BKR LAB AP GYN ADEQUACY: NORMAL
BKR LAB AP GYN INTERPRETATION: NORMAL
BKR LAB AP PREVIOUS ABNORMAL: NORMAL
PATH REPORT.COMMENTS IMP SPEC: NORMAL
PATH REPORT.COMMENTS IMP SPEC: NORMAL
PATH REPORT.RELEVANT HX SPEC: NORMAL

## 2024-09-16 PROBLEM — Z12.4 CERVICAL CANCER SCREENING: Status: ACTIVE | Noted: 2024-09-16

## 2024-12-26 ENCOUNTER — PATIENT OUTREACH (OUTPATIENT)
Dept: CARE COORDINATION | Facility: CLINIC | Age: 64
End: 2024-12-26
Payer: COMMERCIAL

## 2025-01-24 ENCOUNTER — HOSPITAL ENCOUNTER (OUTPATIENT)
Dept: MAMMOGRAPHY | Facility: CLINIC | Age: 65
Discharge: HOME | End: 2025-01-24
Attending: PHYSICIAN ASSISTANT | Admitting: PHYSICIAN ASSISTANT
Payer: COMMERCIAL

## 2025-01-24 DIAGNOSIS — Z12.31 VISIT FOR SCREENING MAMMOGRAM: ICD-10-CM

## 2025-01-24 PROCEDURE — 77063 BREAST TOMOSYNTHESIS BI: CPT

## 2025-04-04 ENCOUNTER — TRANSFERRED RECORDS (OUTPATIENT)
Dept: HEALTH INFORMATION MANAGEMENT | Facility: CLINIC | Age: 65
End: 2025-04-04
Payer: COMMERCIAL

## 2025-04-21 ENCOUNTER — TRANSFERRED RECORDS (OUTPATIENT)
Dept: HEALTH INFORMATION MANAGEMENT | Facility: CLINIC | Age: 65
End: 2025-04-21
Payer: COMMERCIAL

## 2025-05-08 ENCOUNTER — PATIENT OUTREACH (OUTPATIENT)
Dept: CARE COORDINATION | Facility: CLINIC | Age: 65
End: 2025-05-08
Payer: COMMERCIAL

## 2025-05-19 DIAGNOSIS — I10 BENIGN ESSENTIAL HYPERTENSION: ICD-10-CM

## 2025-05-19 DIAGNOSIS — I21.4 NSTEMI (NON-ST ELEVATED MYOCARDIAL INFARCTION) (H): ICD-10-CM

## 2025-05-19 RX ORDER — LISINOPRIL 20 MG/1
20 TABLET ORAL DAILY
Qty: 90 TABLET | Refills: 0 | Status: SHIPPED | OUTPATIENT
Start: 2025-05-19

## 2025-05-19 RX ORDER — ATORVASTATIN CALCIUM 40 MG/1
40 TABLET, FILM COATED ORAL DAILY
Qty: 90 TABLET | Refills: 0 | Status: SHIPPED | OUTPATIENT
Start: 2025-05-19

## 2025-06-05 ENCOUNTER — PATIENT OUTREACH (OUTPATIENT)
Dept: GASTROENTEROLOGY | Facility: CLINIC | Age: 65
End: 2025-06-05
Payer: COMMERCIAL

## 2025-07-07 SDOH — HEALTH STABILITY: PHYSICAL HEALTH: ON AVERAGE, HOW MANY MINUTES DO YOU ENGAGE IN EXERCISE AT THIS LEVEL?: 40 MIN

## 2025-07-07 SDOH — HEALTH STABILITY: PHYSICAL HEALTH: ON AVERAGE, HOW MANY DAYS PER WEEK DO YOU ENGAGE IN MODERATE TO STRENUOUS EXERCISE (LIKE A BRISK WALK)?: 4 DAYS

## 2025-07-07 ASSESSMENT — SOCIAL DETERMINANTS OF HEALTH (SDOH): HOW OFTEN DO YOU GET TOGETHER WITH FRIENDS OR RELATIVES?: TWICE A WEEK

## 2025-07-10 ENCOUNTER — OFFICE VISIT (OUTPATIENT)
Dept: FAMILY MEDICINE | Facility: OTHER | Age: 65
End: 2025-07-10
Payer: COMMERCIAL

## 2025-07-10 VITALS
HEIGHT: 62 IN | HEART RATE: 64 BPM | TEMPERATURE: 97 F | WEIGHT: 152 LBS | RESPIRATION RATE: 16 BRPM | BODY MASS INDEX: 27.97 KG/M2 | DIASTOLIC BLOOD PRESSURE: 82 MMHG | SYSTOLIC BLOOD PRESSURE: 130 MMHG | OXYGEN SATURATION: 98 %

## 2025-07-10 DIAGNOSIS — I10 BENIGN ESSENTIAL HYPERTENSION: ICD-10-CM

## 2025-07-10 DIAGNOSIS — I25.2 HX OF NON-ST ELEVATION MYOCARDIAL INFARCTION (NSTEMI): ICD-10-CM

## 2025-07-10 DIAGNOSIS — E78.5 HYPERLIPIDEMIA LDL GOAL <70: ICD-10-CM

## 2025-07-10 DIAGNOSIS — Z00.00 ENCOUNTER FOR ROUTINE ADULT HEALTH EXAMINATION WITHOUT ABNORMAL FINDINGS: Primary | ICD-10-CM

## 2025-07-10 LAB
ALBUMIN SERPL BCG-MCNC: 4.3 G/DL (ref 3.5–5.2)
ALP SERPL-CCNC: 124 U/L (ref 40–150)
ALT SERPL W P-5'-P-CCNC: 23 U/L (ref 0–50)
ANION GAP SERPL CALCULATED.3IONS-SCNC: 13 MMOL/L (ref 7–15)
AST SERPL W P-5'-P-CCNC: 32 U/L (ref 0–45)
BILIRUB SERPL-MCNC: 1.1 MG/DL
BUN SERPL-MCNC: 9.1 MG/DL (ref 8–23)
CALCIUM SERPL-MCNC: 9.2 MG/DL (ref 8.8–10.4)
CHLORIDE SERPL-SCNC: 94 MMOL/L (ref 98–107)
CHOLEST SERPL-MCNC: 181 MG/DL
CREAT SERPL-MCNC: 0.67 MG/DL (ref 0.51–0.95)
EGFRCR SERPLBLD CKD-EPI 2021: >90 ML/MIN/1.73M2
FASTING STATUS PATIENT QL REPORTED: YES
FASTING STATUS PATIENT QL REPORTED: YES
GLUCOSE SERPL-MCNC: 100 MG/DL (ref 70–99)
HCO3 SERPL-SCNC: 25 MMOL/L (ref 22–29)
HDLC SERPL-MCNC: 94 MG/DL
LDLC SERPL CALC-MCNC: 76 MG/DL
NONHDLC SERPL-MCNC: 87 MG/DL
POTASSIUM SERPL-SCNC: 4.2 MMOL/L (ref 3.4–5.3)
PROT SERPL-MCNC: 7.5 G/DL (ref 6.4–8.3)
SODIUM SERPL-SCNC: 132 MMOL/L (ref 135–145)
TRIGL SERPL-MCNC: 53 MG/DL

## 2025-07-10 RX ORDER — LISINOPRIL 20 MG/1
20 TABLET ORAL DAILY
Qty: 90 TABLET | Refills: 3 | Status: SHIPPED | OUTPATIENT
Start: 2025-07-10

## 2025-07-10 RX ORDER — ATORVASTATIN CALCIUM 40 MG/1
40 TABLET, FILM COATED ORAL DAILY
Qty: 90 TABLET | Refills: 3 | Status: SHIPPED | OUTPATIENT
Start: 2025-07-10

## 2025-07-10 ASSESSMENT — PAIN SCALES - GENERAL: PAINLEVEL_OUTOF10: NO PAIN (0)

## 2025-07-10 NOTE — PATIENT INSTRUCTIONS
Patient Education     Continue current medications.    Follow-up annually.  Preventive Care Advice   This is general advice given by our system to help you stay healthy. However, your care team may have specific advice just for you. Please talk to your care team about your preventive care needs.  Nutrition  Eat 5 or more servings of fruits and vegetables each day.  Try wheat bread, brown rice and whole grain pasta (instead of white bread, rice, and pasta).  Get enough calcium and vitamin D. Check the label on foods and aim for 100% of the RDA (recommended daily allowance).  Lifestyle  Exercise at least 150 minutes each week  (30 minutes a day, 5 days a week).  Do muscle strengthening activities 2 days a week. These help control your weight and prevent disease.  No smoking.  Wear sunscreen to prevent skin cancer.  Have a dental exam and cleaning every 6 months.  Yearly exams  See your health care team every year to talk about:  Any changes in your health.  Any medicines your care team has prescribed.  Preventive care, family planning, and ways to prevent chronic diseases.  Shots (vaccines)   HPV shots (up to age 26), if you've never had them before.  Hepatitis B shots (up to age 59), if you've never had them before.  COVID-19 shot: Get this shot when it's due.  Flu shot: Get a flu shot every year.  Tetanus shot: Get a tetanus shot every 10 years.  Pneumococcal, hepatitis A, and RSV shots: Ask your care team if you need these based on your risk.  Shingles shot (for age 50 and up)  General health tests  Diabetes screening:  Starting at age 35, Get screened for diabetes at least every 3 years.  If you are younger than age 35, ask your care team if you should be screened for diabetes.  Cholesterol test: At age 39, start having a cholesterol test every 5 years, or more often if advised.  Bone density scan (DEXA): At age 50, ask your care team if you should have this scan for osteoporosis (brittle bones).  Hepatitis C: Get  tested at least once in your life.  STIs (sexually transmitted infections)  Before age 24: Ask your care team if you should be screened for STIs.  After age 24: Get screened for STIs if you're at risk. You are at risk for STIs (including HIV) if:  You are sexually active with more than one person.  You don't use condoms every time.  You or a partner was diagnosed with a sexually transmitted infection.  If you are at risk for HIV, ask about PrEP medicine to prevent HIV.  Get tested for HIV at least once in your life, whether you are at risk for HIV or not.  Cancer screening tests  Cervical cancer screening: If you have a cervix, begin getting regular cervical cancer screening tests starting at age 21.  Breast cancer scan (mammogram): If you've ever had breasts, begin having regular mammograms starting at age 40. This is a scan to check for breast cancer.  Colon cancer screening: It is important to start screening for colon cancer at age 45.  Have a colonoscopy test every 10 years (or more often if you're at risk) Or, ask your provider about stool tests like a FIT test every year or Cologuard test every 3 years.  To learn more about your testing options, visit:   .  For help making a decision, visit:   https://bit.ly/wc76858.  Prostate cancer screening test: If you have a prostate, ask your care team if a prostate cancer screening test (PSA) at age 55 is right for you.  Lung cancer screening: If you are a current or former smoker ages 50 to 80, ask your care team if ongoing lung cancer screenings are right for you.  For informational purposes only. Not to replace the advice of your health care provider. Copyright   2023 [employer]. All rights reserved. Clinically reviewed by the [facility]. RedT [id number] - REV 01/24.

## 2025-07-10 NOTE — PROGRESS NOTES
"Preventive Care Visit  [facility]  Broderick Siu PA-C, Family Medicine  Jul 10, 2025    Assessment & Plan       ICD-10-CM    1. Encounter for routine adult health examination without abnormal findings  Z00.00       2. Benign essential hypertension  I10 lisinopril (ZESTRIL) 20 MG tablet     Comprehensive metabolic panel (BMP + Alb, Alk Phos, ALT, AST, Total. Bili, TP)     Comprehensive metabolic panel (BMP + Alb, Alk Phos, ALT, AST, Total. Bili, TP)      3. Hx of non-ST elevation myocardial infarction (NSTEMI)  I25.2       4. Hyperlipidemia LDL goal <70  E78.5 Lipid panel reflex to direct LDL Fasting     atorvastatin (LIPITOR) 40 MG tablet     Lipid panel reflex to direct LDL Fasting          1. Annual exam completed today. She declines any vcacines.    2. Blood pressure is stable on lisinopril. Updated fasting CMP ordered.    3-4. Continue atorvastatin and aspirin. No recent chest pain. She stays very active.    The longitudinal plan of care for the diagnosis(es)/condition(s) as documented were addressed during this visit. Due to the added complexity in care, I will continue to support Rosanna in the subsequent management and with ongoing continuity of care.    Follow-up annually.    Patient has been advised of split billing requirements and indicates understanding: Yes    BMI  Estimated body mass index is 27.62 kg/m  as calculated from the following:    Height as of this encounter: 1.58 m (5' 2.21\").    Weight as of this encounter: 68.9 kg (152 lb).   Weight management plan: Discussed healthy diet and exercise guidelines    Counseling  Appropriate preventive services were addressed with this patient via screening, questionnaire, or discussion as appropriate for fall prevention, nutrition, physical activity, Tobacco-use cessation, social engagement, weight loss and cognition.  Checklist reviewing preventive services available has been given to the patient.  Reviewed patient's diet, " addressing concerns and/or questions.         Juan Marte is a 64 year old, presenting for the following:  Physical        7/10/2025     9:18 AM   Additional Questions   Roomed by Evette MORALES    She denies any new concerns today. She has occasional post-menopausal symptoms such as hot flashes but this is not new.     Advance Care Planning  Discussed advance care planning with patient; informed AVS has link to [employer] [employer].        7/7/2025   General Health   How would you rate your overall physical health? Excellent   Feel stress (tense, anxious, or unable to sleep) Not at all         7/7/2025   Nutrition   Three or more servings of calcium each day? Yes   Diet: Regular (no restrictions)   How many servings of fruit and vegetables per day? (!) 2-3   How many sweetened beverages each day? 0-1         7/7/2025   Exercise   Days per week of moderate/strenous exercise 4 days   Average minutes spent exercising at this level 40 min         7/7/2025   Social Factors   Frequency of gathering with friends or relatives Twice a week   Worry food won't last until get money to buy more Patient declined   Food not last or not have enough money for food? Patient declined   Do you have housing? (Housing is defined as stable permanent housing and does not include staying outside in a car, in a tent, in an abandoned building, in an overnight shelter, or couch-surfing.) Patient declined   Are you worried about losing your housing? Patient declined   Lack of transportation? Patient declined   Unable to get utilities (heat,electricity)? Patient declined         7/7/2025   Fall Risk   Fallen 2 or more times in the past year? No   Trouble with walking or balance? No          7/7/2025   Dental   Dentist two times every year? Yes         Today's PHQ-2 Score:       7/10/2025     9:14 AM   PHQ-2 ( 1999 [employer])   Q1: Little interest or pleasure in doing things 0   Q2: Feeling down, depressed or hopeless 0   PHQ-2 Score 0     Q1: Little interest or pleasure in doing things Not at all   Q2: Feeling down, depressed or hopeless Not at all   PHQ-2 Score 0       Patient-reported           2025   Substance Use   Alcohol more than 3/day or more than 7/wk No   Do you use any other substances recreationally? No     Social History     Tobacco Use    Smoking status: Former     Current packs/day: 0.00     Average packs/day: 0.5 packs/day for 6.0 years (3.0 ttl pk-yrs)     Types: Cigarettes     Start date: 2014     Quit date: 2020     Years since quittin.1    Smokeless tobacco: Former   Vaping Use    Vaping status: Never Used   Substance Use Topics    Alcohol use: Yes     Alcohol/week: 6.0 standard drinks of alcohol     Types: 6 Glasses of wine per week    Drug use: No         2025   LAST FHS-7 RESULTS   1st degree relative breast or ovarian cancer Yes   Any relative bilateral breast cancer No   Any male have breast cancer No   Any ONE woman have BOTH breast AND ovarian cancer No   Any woman with breast cancer before 50yrs No   2 or more relatives with breast AND/OR ovarian cancer No   2 or more relatives with breast AND/OR bowel cancer No       Mammogram Screening - Mammogram every 1-2 years updated in Health Maintenance based on mutual decision making        2025   STI Screening   New sexual partner(s) since last STI/HIV test? No     History of abnormal Pap smear: No - age 30- 64 PAP with HPV every 5 years recommended        Latest Ref Rng & Units 2024     1:19 PM 10/6/2020    12:00 AM 2018     9:15 AM   PAP / HPV   PAP  Negative for Intraepithelial Lesion or Malignancy (NILM)      HPV 16 DNA Negative Negative   Negative    HPV 18 DNA Negative Negative   Negative    Other HR HPV Negative Negative   Negative    PAP-ABSTRACT   See Scanned Document       ASCVD Risk   The ASCVD Risk score (Jorge BARRETT, et al., 2019) failed to calculate for the following reasons:    Risk score cannot be calculated because  "patient has a medical history suggesting prior/existing ASCVD      Reviewed and updated as needed this visit by Provider   Tobacco  Allergies  Meds  Problems  Med Hx  Surg Hx  Fam Hx              Review of Systems  Constitutional, HEENT, cardiovascular, pulmonary, GI, , musculoskeletal, neuro, skin, endocrine and psych systems are negative, except as otherwise noted.     Objective    Exam  /82   Pulse 64   Temp 97  F (36.1  C) (Temporal)   Resp 16   Ht 1.58 m (5' 2.21\")   Wt 68.9 kg (152 lb)   LMP  (LMP Unknown)   SpO2 98%   BMI 27.62 kg/m     Estimated body mass index is 27.62 kg/m  as calculated from the following:    Height as of this encounter: 1.58 m (5' 2.21\").    Weight as of this encounter: 68.9 kg (152 lb).    Physical Exam  GENERAL: alert and no distress  EYES: Eyes grossly normal to inspection, PERRL and conjunctivae and sclerae normal  HENT: ear canals and TM's normal, nose and mouth without ulcers or lesions  NECK: no adenopathy, no asymmetry, masses, or scars  RESP: lungs clear to auscultation - no rales, rhonchi or wheezes  CV: regular rate and rhythm, normal S1 S2, no S3 or S4, no murmur, click or rub, no peripheral edema  ABDOMEN: soft, nontender, no hepatosplenomegaly, no masses and bowel sounds normal  MS: no gross musculoskeletal defects noted, no edema  SKIN: no suspicious lesions or rashes  NEURO: Normal strength and tone, mentation intact and speech normal. Cranial nerves II-XII are grossly intact. DTRs are 2+/4 throughout and symmetric. Gait is stable.   PSYCH: mentation appears normal, affect normal/bright      Signed Electronically by: Broderick Siu PA-C    "

## (undated) DEVICE — SOL WATER IRRIG 1000ML BOTTLE 2F7114

## (undated) DEVICE — KIT HAND CONTROL ANGIOTOUCH ACIST 65CM AT-P65

## (undated) DEVICE — TOTE ANGIO CORP PC15AT SAN32CC83O

## (undated) DEVICE — CATH ANGIO INFINITI AR2 MOD 4FRX100CM 538443

## (undated) DEVICE — CATH DIAG 4FR JL 4.5 538417

## (undated) DEVICE — CATH ANGIO INFINITI 3DRC 4FRX100CM 538476

## (undated) DEVICE — KIT ENDO TURNOVER/PROCEDURE CARRY-ON 101822

## (undated) DEVICE — INTRO SHEATH 4FRX10CM PINNACLE RSS402

## (undated) DEVICE — DEFIB PRO-PADZ LVP LQD GEL ADULT 8900-2105-01

## (undated) DEVICE — GUIDEWIRE VASC 40CM .018IN NT PLAT TI

## (undated) DEVICE — CATH ANGIO INFINITI JR4 4FRX100CM 538421

## (undated) DEVICE — ENDO FORCEP ENDOJAW BIOPSY 2.8MMX230CM FB-220U

## (undated) DEVICE — INTRODUCER CATH VASC 5FRX10CM  MPIS-501-NT-U-SST

## (undated) DEVICE — TUBING SUCTION 6"X3/16" N56A

## (undated) DEVICE — INTRO SHEATH 6FRX10CM PINNACLE RSS602

## (undated) DEVICE — MANIFOLD KIT ANGIO AUTOMATED 014613

## (undated) RX ORDER — FLUMAZENIL 0.1 MG/ML
INJECTION, SOLUTION INTRAVENOUS
Status: DISPENSED
Start: 2020-12-15

## (undated) RX ORDER — HEPARIN SODIUM 1000 [USP'U]/ML
INJECTION, SOLUTION INTRAVENOUS; SUBCUTANEOUS
Status: DISPENSED
Start: 2019-07-24

## (undated) RX ORDER — FENTANYL CITRATE 50 UG/ML
INJECTION, SOLUTION INTRAMUSCULAR; INTRAVENOUS
Status: DISPENSED
Start: 2020-12-15

## (undated) RX ORDER — LIDOCAINE HYDROCHLORIDE 40 MG/ML
SOLUTION TOPICAL
Status: DISPENSED
Start: 2020-12-15

## (undated) RX ORDER — FENTANYL CITRATE 50 UG/ML
INJECTION, SOLUTION INTRAMUSCULAR; INTRAVENOUS
Status: DISPENSED
Start: 2019-07-24

## (undated) RX ORDER — NALOXONE HYDROCHLORIDE 0.4 MG/ML
INJECTION, SOLUTION INTRAMUSCULAR; INTRAVENOUS; SUBCUTANEOUS
Status: DISPENSED
Start: 2020-12-15

## (undated) RX ORDER — GLYCOPYRROLATE 0.2 MG/ML
INJECTION, SOLUTION INTRAMUSCULAR; INTRAVENOUS
Status: DISPENSED
Start: 2020-12-15

## (undated) RX ORDER — LIDOCAINE HYDROCHLORIDE 10 MG/ML
INJECTION, SOLUTION EPIDURAL; INFILTRATION; INTRACAUDAL; PERINEURAL
Status: DISPENSED
Start: 2019-07-24